# Patient Record
Sex: MALE | Race: WHITE | NOT HISPANIC OR LATINO | Employment: OTHER | URBAN - METROPOLITAN AREA
[De-identification: names, ages, dates, MRNs, and addresses within clinical notes are randomized per-mention and may not be internally consistent; named-entity substitution may affect disease eponyms.]

---

## 2023-05-28 ENCOUNTER — HOSPITAL ENCOUNTER (INPATIENT)
Facility: CLINIC | Age: 88
LOS: 1 days | Discharge: ANOTHER HOSPITAL | DRG: 394 | End: 2023-05-29
Attending: EMERGENCY MEDICINE | Admitting: INTERNAL MEDICINE
Payer: MEDICARE

## 2023-05-28 ENCOUNTER — APPOINTMENT (OUTPATIENT)
Dept: GENERAL RADIOLOGY | Facility: CLINIC | Age: 88
DRG: 394 | End: 2023-05-28
Attending: EMERGENCY MEDICINE
Payer: MEDICARE

## 2023-05-28 ENCOUNTER — ANESTHESIA EVENT (OUTPATIENT)
Dept: SURGERY | Facility: CLINIC | Age: 88
DRG: 394 | End: 2023-05-28
Payer: MEDICARE

## 2023-05-28 ENCOUNTER — ANESTHESIA (OUTPATIENT)
Dept: SURGERY | Facility: CLINIC | Age: 88
DRG: 394 | End: 2023-05-28
Payer: MEDICARE

## 2023-05-28 DIAGNOSIS — R53.1 GENERALIZED WEAKNESS: ICD-10-CM

## 2023-05-28 DIAGNOSIS — J20.9 ACUTE BRONCHITIS, UNSPECIFIED ORGANISM: ICD-10-CM

## 2023-05-28 DIAGNOSIS — R06.09 DYSPNEA ON EXERTION: ICD-10-CM

## 2023-05-28 DIAGNOSIS — N18.4 CHRONIC KIDNEY DISEASE (CKD), STAGE IV (SEVERE) (H): ICD-10-CM

## 2023-05-28 DIAGNOSIS — E66.01 MORBID EXOGENOUS OBESITY (H): ICD-10-CM

## 2023-05-28 DIAGNOSIS — Z79.01 CURRENT USE OF LONG TERM ANTICOAGULATION: ICD-10-CM

## 2023-05-28 DIAGNOSIS — I50.42 CHRONIC COMBINED SYSTOLIC AND DIASTOLIC CONGESTIVE HEART FAILURE (H): ICD-10-CM

## 2023-05-28 DIAGNOSIS — Z95.2 HISTORY OF AORTIC VALVE REPLACEMENT: ICD-10-CM

## 2023-05-28 DIAGNOSIS — T18.108A FOREIGN BODY IN ESOPHAGUS, INITIAL ENCOUNTER: Primary | ICD-10-CM

## 2023-05-28 DIAGNOSIS — R62.7 FAILURE TO THRIVE IN ADULT: ICD-10-CM

## 2023-05-28 DIAGNOSIS — I48.0 PAROXYSMAL ATRIAL FIBRILLATION (H): ICD-10-CM

## 2023-05-28 PROBLEM — M10.9 GOUT: Status: ACTIVE | Noted: 2018-03-03

## 2023-05-28 PROBLEM — K31.1 GASTRIC OUTLET OBSTRUCTION: Status: ACTIVE | Noted: 2023-03-20

## 2023-05-28 PROBLEM — I50.32 CHRONIC DIASTOLIC HEART FAILURE (H): Status: ACTIVE | Noted: 2020-05-06

## 2023-05-28 PROBLEM — I44.7 LBBB (LEFT BUNDLE BRANCH BLOCK): Status: ACTIVE | Noted: 2019-11-10

## 2023-05-28 PROBLEM — M47.816 SPONDYLOSIS OF LUMBAR REGION WITHOUT MYELOPATHY OR RADICULOPATHY: Status: ACTIVE | Noted: 2019-03-27

## 2023-05-28 PROBLEM — I45.9 HEART BLOCK: Status: ACTIVE | Noted: 2019-12-27

## 2023-05-28 PROBLEM — Z95.0 PACEMAKER: Status: ACTIVE | Noted: 2020-01-09

## 2023-05-28 PROBLEM — E11.9 TYPE 2 DIABETES MELLITUS (H): Status: ACTIVE | Noted: 2018-03-03

## 2023-05-28 PROBLEM — I10 ESSENTIAL HYPERTENSION: Status: ACTIVE | Noted: 2019-03-24

## 2023-05-28 PROBLEM — N25.81 SECONDARY HYPERPARATHYROIDISM (H): Status: ACTIVE | Noted: 2023-04-27

## 2023-05-28 LAB
ALBUMIN SERPL BCG-MCNC: 4 G/DL (ref 3.5–5.2)
ALP SERPL-CCNC: 85 U/L (ref 40–129)
ALT SERPL W P-5'-P-CCNC: 11 U/L (ref 10–50)
ANION GAP SERPL CALCULATED.3IONS-SCNC: 15 MMOL/L (ref 7–15)
AST SERPL W P-5'-P-CCNC: 19 U/L (ref 10–50)
BASOPHILS # BLD AUTO: 0 10E3/UL (ref 0–0.2)
BASOPHILS NFR BLD AUTO: 0 %
BILIRUB SERPL-MCNC: 0.8 MG/DL
BUN SERPL-MCNC: 48.7 MG/DL (ref 8–23)
CALCIUM SERPL-MCNC: 9.7 MG/DL (ref 8.8–10.2)
CHLORIDE SERPL-SCNC: 101 MMOL/L (ref 98–107)
CREAT SERPL-MCNC: 1.92 MG/DL (ref 0.67–1.17)
DEPRECATED HCO3 PLAS-SCNC: 27 MMOL/L (ref 22–29)
EOSINOPHIL # BLD AUTO: 0.2 10E3/UL (ref 0–0.7)
EOSINOPHIL NFR BLD AUTO: 3 %
ERYTHROCYTE [DISTWIDTH] IN BLOOD BY AUTOMATED COUNT: 15.2 % (ref 10–15)
FLUAV RNA SPEC QL NAA+PROBE: NEGATIVE
FLUBV RNA RESP QL NAA+PROBE: NEGATIVE
GFR SERPL CREATININE-BSD FRML MDRD: 34 ML/MIN/1.73M2
GLUCOSE BLDC GLUCOMTR-MCNC: 123 MG/DL (ref 70–99)
GLUCOSE SERPL-MCNC: 143 MG/DL (ref 70–99)
HCT VFR BLD AUTO: 32.9 % (ref 40–53)
HGB BLD-MCNC: 10.6 G/DL (ref 13.3–17.7)
HOLD SPECIMEN: NORMAL
IMM GRANULOCYTES # BLD: 0 10E3/UL
IMM GRANULOCYTES NFR BLD: 1 %
INR PPP: 2.3 (ref 0.85–1.15)
LYMPHOCYTES # BLD AUTO: 1 10E3/UL (ref 0.8–5.3)
LYMPHOCYTES NFR BLD AUTO: 14 %
MCH RBC QN AUTO: 31.2 PG (ref 26.5–33)
MCHC RBC AUTO-ENTMCNC: 32.2 G/DL (ref 31.5–36.5)
MCV RBC AUTO: 97 FL (ref 78–100)
MONOCYTES # BLD AUTO: 0.4 10E3/UL (ref 0–1.3)
MONOCYTES NFR BLD AUTO: 6 %
NEUTROPHILS # BLD AUTO: 5 10E3/UL (ref 1.6–8.3)
NEUTROPHILS NFR BLD AUTO: 76 %
NRBC # BLD AUTO: 0 10E3/UL
NRBC BLD AUTO-RTO: 0 /100
NT-PROBNP SERPL-MCNC: 2588 PG/ML (ref 0–1800)
PLATELET # BLD AUTO: 126 10E3/UL (ref 150–450)
POTASSIUM SERPL-SCNC: 3.5 MMOL/L (ref 3.4–5.3)
PROT SERPL-MCNC: 7.7 G/DL (ref 6.4–8.3)
RBC # BLD AUTO: 3.4 10E6/UL (ref 4.4–5.9)
RSV RNA SPEC NAA+PROBE: NEGATIVE
SARS-COV-2 RNA RESP QL NAA+PROBE: NEGATIVE
SODIUM SERPL-SCNC: 143 MMOL/L (ref 136–145)
TROPONIN T SERPL HS-MCNC: 41 NG/L
TROPONIN T SERPL HS-MCNC: 42 NG/L
UPPER GI ENDOSCOPY: NORMAL
WBC # BLD AUTO: 6.6 10E3/UL (ref 4–11)

## 2023-05-28 PROCEDURE — 258N000003 HC RX IP 258 OP 636: Performed by: EMERGENCY MEDICINE

## 2023-05-28 PROCEDURE — 96365 THER/PROPH/DIAG IV INF INIT: CPT | Performed by: EMERGENCY MEDICINE

## 2023-05-28 PROCEDURE — 250N000011 HC RX IP 250 OP 636

## 2023-05-28 PROCEDURE — 250N000013 HC RX MED GY IP 250 OP 250 PS 637: Performed by: INTERNAL MEDICINE

## 2023-05-28 PROCEDURE — 370N000017 HC ANESTHESIA TECHNICAL FEE, PER MIN: Performed by: SPECIALIST

## 2023-05-28 PROCEDURE — 99222 1ST HOSP IP/OBS MODERATE 55: CPT | Mod: AI | Performed by: INTERNAL MEDICINE

## 2023-05-28 PROCEDURE — G0378 HOSPITAL OBSERVATION PER HR: HCPCS

## 2023-05-28 PROCEDURE — 84484 ASSAY OF TROPONIN QUANT: CPT | Performed by: EMERGENCY MEDICINE

## 2023-05-28 PROCEDURE — 250N000013 HC RX MED GY IP 250 OP 250 PS 637: Performed by: FAMILY MEDICINE

## 2023-05-28 PROCEDURE — 43247 EGD REMOVE FOREIGN BODY: CPT | Performed by: SPECIALIST

## 2023-05-28 PROCEDURE — 99222 1ST HOSP IP/OBS MODERATE 55: CPT | Mod: 25 | Performed by: SPECIALIST

## 2023-05-28 PROCEDURE — 250N000011 HC RX IP 250 OP 636: Performed by: NURSE ANESTHETIST, CERTIFIED REGISTERED

## 2023-05-28 PROCEDURE — 80053 COMPREHEN METABOLIC PANEL: CPT | Performed by: EMERGENCY MEDICINE

## 2023-05-28 PROCEDURE — 83880 ASSAY OF NATRIURETIC PEPTIDE: CPT | Performed by: EMERGENCY MEDICINE

## 2023-05-28 PROCEDURE — 71046 X-RAY EXAM CHEST 2 VIEWS: CPT

## 2023-05-28 PROCEDURE — 258N000003 HC RX IP 258 OP 636: Performed by: NURSE ANESTHETIST, CERTIFIED REGISTERED

## 2023-05-28 PROCEDURE — 93010 ELECTROCARDIOGRAM REPORT: CPT | Performed by: EMERGENCY MEDICINE

## 2023-05-28 PROCEDURE — 272N000001 HC OR GENERAL SUPPLY STERILE: Performed by: SPECIALIST

## 2023-05-28 PROCEDURE — 96375 TX/PRO/DX INJ NEW DRUG ADDON: CPT | Performed by: EMERGENCY MEDICINE

## 2023-05-28 PROCEDURE — 93005 ELECTROCARDIOGRAM TRACING: CPT | Performed by: EMERGENCY MEDICINE

## 2023-05-28 PROCEDURE — 710N000009 HC RECOVERY PHASE 1, LEVEL 1, PER MIN: Performed by: SPECIALIST

## 2023-05-28 PROCEDURE — 0DC58ZZ EXTIRPATION OF MATTER FROM ESOPHAGUS, VIA NATURAL OR ARTIFICIAL OPENING ENDOSCOPIC: ICD-10-PCS | Performed by: SPECIALIST

## 2023-05-28 PROCEDURE — 250N000009 HC RX 250: Performed by: NURSE ANESTHETIST, CERTIFIED REGISTERED

## 2023-05-28 PROCEDURE — 250N000009 HC RX 250: Performed by: EMERGENCY MEDICINE

## 2023-05-28 PROCEDURE — 99285 EMERGENCY DEPT VISIT HI MDM: CPT | Mod: 25 | Performed by: EMERGENCY MEDICINE

## 2023-05-28 PROCEDURE — 36415 COLL VENOUS BLD VENIPUNCTURE: CPT | Performed by: EMERGENCY MEDICINE

## 2023-05-28 PROCEDURE — 250N000011 HC RX IP 250 OP 636: Performed by: EMERGENCY MEDICINE

## 2023-05-28 PROCEDURE — 87637 SARSCOV2&INF A&B&RSV AMP PRB: CPT | Performed by: EMERGENCY MEDICINE

## 2023-05-28 PROCEDURE — 85025 COMPLETE CBC W/AUTO DIFF WBC: CPT | Performed by: EMERGENCY MEDICINE

## 2023-05-28 PROCEDURE — 82962 GLUCOSE BLOOD TEST: CPT

## 2023-05-28 PROCEDURE — 360N000075 HC SURGERY LEVEL 2, PER MIN: Performed by: SPECIALIST

## 2023-05-28 PROCEDURE — C9803 HOPD COVID-19 SPEC COLLECT: HCPCS | Performed by: EMERGENCY MEDICINE

## 2023-05-28 PROCEDURE — 85610 PROTHROMBIN TIME: CPT | Performed by: EMERGENCY MEDICINE

## 2023-05-28 RX ORDER — HYDROMORPHONE HCL IN WATER/PF 6 MG/30 ML
PATIENT CONTROLLED ANALGESIA SYRINGE INTRAVENOUS
Status: COMPLETED
Start: 2023-05-28 | End: 2023-05-28

## 2023-05-28 RX ORDER — POTASSIUM CHLORIDE 20MEQ/15ML
20 LIQUID (ML) ORAL DAILY
Status: DISCONTINUED | OUTPATIENT
Start: 2023-05-29 | End: 2023-05-29 | Stop reason: HOSPADM

## 2023-05-28 RX ORDER — ALLOPURINOL 100 MG/1
100 TABLET ORAL DAILY
Status: DISCONTINUED | OUTPATIENT
Start: 2023-05-28 | End: 2023-05-29 | Stop reason: HOSPADM

## 2023-05-28 RX ORDER — WARFARIN SODIUM 2 MG/1
6 TABLET ORAL
Status: DISCONTINUED | OUTPATIENT
Start: 2023-05-28 | End: 2023-05-29

## 2023-05-28 RX ORDER — CALCITRIOL 0.25 UG/1
0.25 CAPSULE, LIQUID FILLED ORAL DAILY
Status: DISCONTINUED | OUTPATIENT
Start: 2023-05-28 | End: 2023-05-29 | Stop reason: HOSPADM

## 2023-05-28 RX ORDER — FENTANYL CITRATE-0.9 % NACL/PF 10 MCG/ML
PLASTIC BAG, INJECTION (ML) INTRAVENOUS PRN
Status: DISCONTINUED | OUTPATIENT
Start: 2023-05-28 | End: 2023-05-28

## 2023-05-28 RX ORDER — NALOXONE HYDROCHLORIDE 0.4 MG/ML
0.2 INJECTION, SOLUTION INTRAMUSCULAR; INTRAVENOUS; SUBCUTANEOUS
Status: DISCONTINUED | OUTPATIENT
Start: 2023-05-28 | End: 2023-05-29

## 2023-05-28 RX ORDER — NALOXONE HYDROCHLORIDE 0.4 MG/ML
0.4 INJECTION, SOLUTION INTRAMUSCULAR; INTRAVENOUS; SUBCUTANEOUS
Status: DISCONTINUED | OUTPATIENT
Start: 2023-05-28 | End: 2023-05-29

## 2023-05-28 RX ORDER — HYDROMORPHONE HCL IN WATER/PF 6 MG/30 ML
0.4 PATIENT CONTROLLED ANALGESIA SYRINGE INTRAVENOUS EVERY 5 MIN PRN
Status: DISCONTINUED | OUTPATIENT
Start: 2023-05-28 | End: 2023-05-28 | Stop reason: HOSPADM

## 2023-05-28 RX ORDER — WARFARIN SODIUM 4 MG/1
4 TABLET ORAL DAILY
Status: ON HOLD | COMMUNITY
End: 2023-06-07

## 2023-05-28 RX ORDER — UREA 8.5 G/85G
1 CREAM TOPICAL PRN
COMMUNITY
End: 2024-01-15

## 2023-05-28 RX ORDER — DIPHENHYDRAMINE HCL 12.5 MG/5ML
12.5 SOLUTION ORAL EVERY 6 HOURS PRN
Status: DISCONTINUED | OUTPATIENT
Start: 2023-05-28 | End: 2023-05-28 | Stop reason: HOSPADM

## 2023-05-28 RX ORDER — SUCRALFATE 1 G/1
1 TABLET ORAL 2 TIMES DAILY WITH MEALS
Status: DISCONTINUED | OUTPATIENT
Start: 2023-05-28 | End: 2023-05-29 | Stop reason: HOSPADM

## 2023-05-28 RX ORDER — ONDANSETRON 4 MG/1
4 TABLET, ORALLY DISINTEGRATING ORAL EVERY 30 MIN PRN
Status: DISCONTINUED | OUTPATIENT
Start: 2023-05-28 | End: 2023-05-28 | Stop reason: HOSPADM

## 2023-05-28 RX ORDER — FERROUS SULFATE 325(65) MG
325 TABLET ORAL
COMMUNITY

## 2023-05-28 RX ORDER — DIMENHYDRINATE 50 MG/ML
25 INJECTION, SOLUTION INTRAMUSCULAR; INTRAVENOUS
Status: DISCONTINUED | OUTPATIENT
Start: 2023-05-28 | End: 2023-05-28 | Stop reason: HOSPADM

## 2023-05-28 RX ORDER — METOPROLOL TARTRATE 1 MG/ML
1-2 INJECTION, SOLUTION INTRAVENOUS EVERY 5 MIN PRN
Status: DISCONTINUED | OUTPATIENT
Start: 2023-05-28 | End: 2023-05-28 | Stop reason: HOSPADM

## 2023-05-28 RX ORDER — ROPINIROLE 1 MG/1
1 TABLET, FILM COATED ORAL AT BEDTIME
COMMUNITY

## 2023-05-28 RX ORDER — NALOXONE HYDROCHLORIDE 0.4 MG/ML
0.4 INJECTION, SOLUTION INTRAMUSCULAR; INTRAVENOUS; SUBCUTANEOUS
Status: DISCONTINUED | OUTPATIENT
Start: 2023-05-28 | End: 2023-05-29 | Stop reason: HOSPADM

## 2023-05-28 RX ORDER — SPIRONOLACTONE 25 MG
12.5 TABLET ORAL DAILY
Status: DISCONTINUED | OUTPATIENT
Start: 2023-05-28 | End: 2023-05-29 | Stop reason: HOSPADM

## 2023-05-28 RX ORDER — LIDOCAINE 40 MG/G
CREAM TOPICAL
Status: CANCELLED | OUTPATIENT
Start: 2023-05-28

## 2023-05-28 RX ORDER — NALOXONE HYDROCHLORIDE 0.4 MG/ML
0.2 INJECTION, SOLUTION INTRAMUSCULAR; INTRAVENOUS; SUBCUTANEOUS
Status: DISCONTINUED | OUTPATIENT
Start: 2023-05-28 | End: 2023-05-29 | Stop reason: HOSPADM

## 2023-05-28 RX ORDER — PANTOPRAZOLE SODIUM 40 MG/1
40 TABLET, DELAYED RELEASE ORAL
Status: DISCONTINUED | OUTPATIENT
Start: 2023-05-28 | End: 2023-05-28

## 2023-05-28 RX ORDER — FENTANYL CITRATE-0.9 % NACL/PF 10 MCG/ML
100 PLASTIC BAG, INJECTION (ML) INTRAVENOUS EVERY 5 MIN PRN
Status: DISCONTINUED | OUTPATIENT
Start: 2023-05-28 | End: 2023-05-29 | Stop reason: HOSPADM

## 2023-05-28 RX ORDER — HYDROMORPHONE HCL IN WATER/PF 6 MG/30 ML
0.2 PATIENT CONTROLLED ANALGESIA SYRINGE INTRAVENOUS EVERY 5 MIN PRN
Status: DISCONTINUED | OUTPATIENT
Start: 2023-05-28 | End: 2023-05-28 | Stop reason: HOSPADM

## 2023-05-28 RX ORDER — SODIUM CHLORIDE, SODIUM LACTATE, POTASSIUM CHLORIDE, CALCIUM CHLORIDE 600; 310; 30; 20 MG/100ML; MG/100ML; MG/100ML; MG/100ML
INJECTION, SOLUTION INTRAVENOUS CONTINUOUS
Status: DISCONTINUED | OUTPATIENT
Start: 2023-05-28 | End: 2023-05-28 | Stop reason: HOSPADM

## 2023-05-28 RX ORDER — ONDANSETRON 2 MG/ML
INJECTION INTRAMUSCULAR; INTRAVENOUS PRN
Status: DISCONTINUED | OUTPATIENT
Start: 2023-05-28 | End: 2023-05-28

## 2023-05-28 RX ORDER — HYDROMORPHONE HCL IN WATER/PF 6 MG/30 ML
PATIENT CONTROLLED ANALGESIA SYRINGE INTRAVENOUS
Status: DISCONTINUED
Start: 2023-05-28 | End: 2023-05-28 | Stop reason: HOSPADM

## 2023-05-28 RX ORDER — SPIRONOLACTONE 25 MG/1
0.5 TABLET ORAL DAILY
COMMUNITY
Start: 2023-02-09 | End: 2024-01-15

## 2023-05-28 RX ORDER — LIDOCAINE 40 MG/G
CREAM TOPICAL
Status: DISCONTINUED | OUTPATIENT
Start: 2023-05-28 | End: 2023-05-29 | Stop reason: HOSPADM

## 2023-05-28 RX ORDER — LEVOTHYROXINE SODIUM 100 UG/1
100 TABLET ORAL DAILY
Status: DISCONTINUED | OUTPATIENT
Start: 2023-05-28 | End: 2023-05-29 | Stop reason: HOSPADM

## 2023-05-28 RX ORDER — PANTOPRAZOLE SODIUM 40 MG/1
40 TABLET, DELAYED RELEASE ORAL
Status: ON HOLD | COMMUNITY
End: 2023-06-03

## 2023-05-28 RX ORDER — CEFTRIAXONE 2 G/1
2 INJECTION, POWDER, FOR SOLUTION INTRAMUSCULAR; INTRAVENOUS ONCE
Status: COMPLETED | OUTPATIENT
Start: 2023-05-28 | End: 2023-05-28

## 2023-05-28 RX ORDER — ATORVASTATIN CALCIUM 10 MG/1
10 TABLET, FILM COATED ORAL DAILY
Status: DISCONTINUED | OUTPATIENT
Start: 2023-05-28 | End: 2023-05-29 | Stop reason: HOSPADM

## 2023-05-28 RX ORDER — ROPINIROLE 1 MG/1
1 TABLET, FILM COATED ORAL AT BEDTIME
Status: DISCONTINUED | OUTPATIENT
Start: 2023-05-28 | End: 2023-05-29 | Stop reason: HOSPADM

## 2023-05-28 RX ORDER — SODIUM CHLORIDE, SODIUM LACTATE, POTASSIUM CHLORIDE, CALCIUM CHLORIDE 600; 310; 30; 20 MG/100ML; MG/100ML; MG/100ML; MG/100ML
INJECTION, SOLUTION INTRAVENOUS CONTINUOUS PRN
Status: DISCONTINUED | OUTPATIENT
Start: 2023-05-28 | End: 2023-05-28

## 2023-05-28 RX ORDER — BUMETANIDE 1 MG/1
2 TABLET ORAL 2 TIMES DAILY
Status: DISCONTINUED | OUTPATIENT
Start: 2023-05-28 | End: 2023-05-29 | Stop reason: HOSPADM

## 2023-05-28 RX ORDER — FENTANYL CITRATE 50 UG/ML
25 INJECTION, SOLUTION INTRAMUSCULAR; INTRAVENOUS EVERY 5 MIN PRN
Status: DISCONTINUED | OUTPATIENT
Start: 2023-05-28 | End: 2023-05-28 | Stop reason: HOSPADM

## 2023-05-28 RX ORDER — ATORVASTATIN CALCIUM 10 MG/1
10 TABLET, FILM COATED ORAL DAILY
COMMUNITY
End: 2024-01-15

## 2023-05-28 RX ORDER — ACETAMINOPHEN 500 MG
500 TABLET ORAL DAILY
Status: DISCONTINUED | OUTPATIENT
Start: 2023-05-28 | End: 2023-05-29 | Stop reason: HOSPADM

## 2023-05-28 RX ORDER — ALLOPURINOL 100 MG/1
100 TABLET ORAL DAILY
COMMUNITY
End: 2024-01-15

## 2023-05-28 RX ORDER — FLUMAZENIL 0.1 MG/ML
0.2 INJECTION, SOLUTION INTRAVENOUS
Status: ACTIVE | OUTPATIENT
Start: 2023-05-28 | End: 2023-05-29

## 2023-05-28 RX ORDER — EPHEDRINE SULFATE 50 MG/ML
INJECTION, SOLUTION INTRAVENOUS PRN
Status: DISCONTINUED | OUTPATIENT
Start: 2023-05-28 | End: 2023-05-28

## 2023-05-28 RX ORDER — PROPOFOL 10 MG/ML
INJECTION, EMULSION INTRAVENOUS PRN
Status: DISCONTINUED | OUTPATIENT
Start: 2023-05-28 | End: 2023-05-28

## 2023-05-28 RX ORDER — LEVOTHYROXINE SODIUM 100 UG/1
100 TABLET ORAL DAILY
COMMUNITY

## 2023-05-28 RX ORDER — FENTANYL CITRATE 50 UG/ML
INJECTION, SOLUTION INTRAMUSCULAR; INTRAVENOUS PRN
Status: DISCONTINUED | OUTPATIENT
Start: 2023-05-28 | End: 2023-05-28

## 2023-05-28 RX ORDER — ONDANSETRON 2 MG/ML
4 INJECTION INTRAMUSCULAR; INTRAVENOUS EVERY 30 MIN PRN
Status: DISCONTINUED | OUTPATIENT
Start: 2023-05-28 | End: 2023-05-28 | Stop reason: HOSPADM

## 2023-05-28 RX ORDER — ACETAMINOPHEN 500 MG
500 TABLET ORAL DAILY
COMMUNITY
End: 2024-01-15

## 2023-05-28 RX ORDER — GABAPENTIN 100 MG/1
100 CAPSULE ORAL DAILY
Status: DISCONTINUED | OUTPATIENT
Start: 2023-05-28 | End: 2023-05-29 | Stop reason: HOSPADM

## 2023-05-28 RX ORDER — POTASSIUM CHLORIDE 1500 MG/1
20 TABLET, EXTENDED RELEASE ORAL DAILY
Status: DISCONTINUED | OUTPATIENT
Start: 2023-05-28 | End: 2023-05-28

## 2023-05-28 RX ORDER — DIPHENHYDRAMINE HYDROCHLORIDE 50 MG/ML
12.5 INJECTION INTRAMUSCULAR; INTRAVENOUS EVERY 6 HOURS PRN
Status: DISCONTINUED | OUTPATIENT
Start: 2023-05-28 | End: 2023-05-28 | Stop reason: HOSPADM

## 2023-05-28 RX ORDER — CALCITRIOL 0.25 UG/1
0.25 CAPSULE, LIQUID FILLED ORAL DAILY
COMMUNITY

## 2023-05-28 RX ORDER — FERROUS SULFATE 325(65) MG
325 TABLET ORAL
Status: DISCONTINUED | OUTPATIENT
Start: 2023-05-29 | End: 2023-05-29 | Stop reason: HOSPADM

## 2023-05-28 RX ORDER — FENTANYL CITRATE 50 UG/ML
50 INJECTION, SOLUTION INTRAMUSCULAR; INTRAVENOUS EVERY 5 MIN PRN
Status: DISCONTINUED | OUTPATIENT
Start: 2023-05-28 | End: 2023-05-28 | Stop reason: HOSPADM

## 2023-05-28 RX ORDER — GABAPENTIN 100 MG/1
100 CAPSULE ORAL DAILY
COMMUNITY
End: 2024-01-15

## 2023-05-28 RX ORDER — ONDANSETRON 2 MG/ML
4 INJECTION INTRAMUSCULAR; INTRAVENOUS ONCE
Status: COMPLETED | OUTPATIENT
Start: 2023-05-28 | End: 2023-05-28

## 2023-05-28 RX ORDER — POTASSIUM CHLORIDE 750 MG/1
2 TABLET, EXTENDED RELEASE ORAL
COMMUNITY

## 2023-05-28 RX ORDER — ELECTROLYTES/DEXTROSE
1 SOLUTION, ORAL ORAL DAILY
COMMUNITY
End: 2024-01-15

## 2023-05-28 RX ORDER — SUCRALFATE 1 G/1
1 TABLET ORAL 2 TIMES DAILY WITH MEALS
COMMUNITY
End: 2024-01-15

## 2023-05-28 RX ORDER — BUMETANIDE 2 MG/1
4 TABLET ORAL 3 TIMES DAILY
COMMUNITY

## 2023-05-28 RX ADMIN — HYDROMORPHONE HYDROCHLORIDE 0.2 MG: 0.2 INJECTION, SOLUTION INTRAMUSCULAR; INTRAVENOUS; SUBCUTANEOUS at 16:52

## 2023-05-28 RX ADMIN — ONDANSETRON 4 MG: 2 INJECTION INTRAMUSCULAR; INTRAVENOUS at 15:19

## 2023-05-28 RX ADMIN — ROPINIROLE HYDROCHLORIDE 1 MG: 1 TABLET, FILM COATED ORAL at 20:18

## 2023-05-28 RX ADMIN — HYDROMORPHONE HYDROCHLORIDE 0.4 MG: 0.2 INJECTION, SOLUTION INTRAMUSCULAR; INTRAVENOUS; SUBCUTANEOUS at 17:00

## 2023-05-28 RX ADMIN — ONDANSETRON 4 MG: 2 INJECTION INTRAMUSCULAR; INTRAVENOUS at 10:28

## 2023-05-28 RX ADMIN — FENTANYL CITRATE 25 MCG: 50 INJECTION, SOLUTION INTRAMUSCULAR; INTRAVENOUS at 15:33

## 2023-05-28 RX ADMIN — LIDOCAINE HYDROCHLORIDE 50 MG: 10 INJECTION, SOLUTION EPIDURAL; INFILTRATION; INTRACAUDAL; PERINEURAL at 15:11

## 2023-05-28 RX ADMIN — PROPOFOL 150 MCG/KG/MIN: 10 INJECTION, EMULSION INTRAVENOUS at 15:12

## 2023-05-28 RX ADMIN — SODIUM CHLORIDE, POTASSIUM CHLORIDE, SODIUM LACTATE AND CALCIUM CHLORIDE: 600; 310; 30; 20 INJECTION, SOLUTION INTRAVENOUS at 15:07

## 2023-05-28 RX ADMIN — BENZOCAINE 6 MG-MENTHOL 10 MG LOZENGES 1 LOZENGE: at 18:22

## 2023-05-28 RX ADMIN — CEFTRIAXONE SODIUM 2 G: 2 INJECTION, POWDER, FOR SOLUTION INTRAMUSCULAR; INTRAVENOUS at 09:40

## 2023-05-28 RX ADMIN — Medication 100 MG: at 15:11

## 2023-05-28 RX ADMIN — Medication 0.4 MG: at 17:00

## 2023-05-28 RX ADMIN — FENTANYL CITRATE 25 MCG: 50 INJECTION, SOLUTION INTRAMUSCULAR; INTRAVENOUS at 15:11

## 2023-05-28 RX ADMIN — Medication 100 MCG: at 15:12

## 2023-05-28 RX ADMIN — Medication 200 MCG: at 16:20

## 2023-05-28 RX ADMIN — ACETAMINOPHEN 500 MG: 500 TABLET ORAL at 20:19

## 2023-05-28 RX ADMIN — EPHEDRINE SULFATE 5 MG: 50 INJECTION, SOLUTION INTRAVENOUS at 15:53

## 2023-05-28 RX ADMIN — EPHEDRINE SULFATE 10 MG: 50 INJECTION, SOLUTION INTRAVENOUS at 15:24

## 2023-05-28 RX ADMIN — AZITHROMYCIN MONOHYDRATE 500 MG: 500 INJECTION, POWDER, LYOPHILIZED, FOR SOLUTION INTRAVENOUS at 10:28

## 2023-05-28 RX ADMIN — PROPOFOL 130 MG: 10 INJECTION, EMULSION INTRAVENOUS at 15:11

## 2023-05-28 RX ADMIN — EPHEDRINE SULFATE 10 MG: 50 INJECTION, SOLUTION INTRAVENOUS at 15:30

## 2023-05-28 ASSESSMENT — ACTIVITIES OF DAILY LIVING (ADL)
ADLS_ACUITY_SCORE: 33
ADLS_ACUITY_SCORE: 35
ADLS_ACUITY_SCORE: 33
ADLS_ACUITY_SCORE: 41
ADLS_ACUITY_SCORE: 31
ADLS_ACUITY_SCORE: 35
ADLS_ACUITY_SCORE: 35
ADLS_ACUITY_SCORE: 33
ADLS_ACUITY_SCORE: 41

## 2023-05-28 ASSESSMENT — ENCOUNTER SYMPTOMS: DYSRHYTHMIAS: 1

## 2023-05-28 NOTE — PROGRESS NOTES
Spoke with MD about medications and orders. No medications at this time PO. Hold all meds and BP meds.

## 2023-05-28 NOTE — ED TRIAGE NOTES
Pt arrives with c/o SOB and productive cough for a few days. Denies chest pain or fevers     Triage Assessment     Row Name 05/28/23 0703       Triage Assessment (Adult)    Airway WDL WDL       Respiratory WDL    Respiratory WDL X;cough    Cough Frequency frequent    Cough Type productive       Skin Circulation/Temperature WDL    Skin Circulation/Temperature WDL WDL       Cardiac WDL    Cardiac WDL WDL       Peripheral/Neurovascular WDL    Peripheral Neurovascular WDL WDL       Cognitive/Neuro/Behavioral WDL    Cognitive/Neuro/Behavioral WDL WDL

## 2023-05-28 NOTE — PROGRESS NOTES
Patient requesting allowing daughters to get health infor    Viviane Izaguirre [phone]  Myla Valerio [phone]

## 2023-05-28 NOTE — ANESTHESIA PREPROCEDURE EVALUATION
Anesthesia Pre-Procedure Evaluation    Patient: Chacorta Mendoza   MRN: [id number] : 1935        Procedure : Procedure(s):  ESOPHAGOGASTRODUODENOSCOPY, WITH FOREIGN BODY REMOVAL          No past medical history on file.   No past surgical history on file.   Allergies   Allergen Reactions     Levofloxacin Muscle Pain (Myalgia) and Other (See Comments)     Other reaction(s): Myalgias  Muscle Pain  Muscle Pain  Muscle Pain       Lisinopril Other (See Comments)     Changed to ARB due to rising creatinine  Changed to ARB due to rising creatinine  Changed to ARB due to rising creatinine        Social History     Tobacco Use     Smoking status: Not on file     Smokeless tobacco: Not on file   Substance Use Topics     Alcohol use: Not on file      Wt Readings from Last 1 Encounters:   23 136.1 kg (300 lb)        Anesthesia Evaluation   Pt has had prior anesthetic. Type: General.        ROS/MED HX  ENT/Pulmonary:     (+) sleep apnea, moderate,     Neurologic:       Cardiovascular: Comment: 1.  Severe aortic stenosis              - status post TAVR with Donnell S3 29 mm valve on 2019.   2. 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  Persistent atrial fibrillation              - on anticoagulation with warfarin.   4.  Tachy-tye syndrome              -s/p Micra pacemaker placement 2019  5.  Stage III kidney disease.  6. Hypertension      (+) hypertension-----CHF pacemaker, dysrhythmias, a-fib, valvular problems/murmurs     METS/Exercise Tolerance:     Hematologic:     (+) anemia,     Musculoskeletal:       GI/Hepatic:       Renal/Genitourinary:     (+) renal disease,     Endo:     (+) type II DM, thyroid problem, Obesity,     Psychiatric/Substance Use:       Infectious Disease:       Malignancy:       Other:            Physical Exam    Airway  airway exam normal      Mallampati: II   TM distance: > 3 FB   Neck ROM: full   Mouth opening: > 3 cm    Respiratory Devices and Support          Dental       (+) Minor Abnormalities - some fillings, tiny chips      Cardiovascular             Pulmonary           breath sounds clear to auscultation           OUTSIDE LABS:  CBC:   Lab Results   Component Value Date    WBC 6.6 05/28/2023    HGB 10.6 (L) 05/28/2023    HCT 32.9 (L) 05/28/2023     (L) 05/28/2023     BMP:   Lab Results   Component Value Date     05/28/2023    POTASSIUM 3.5 05/28/2023    CHLORIDE 101 05/28/2023    CO2 27 05/28/2023    BUN 48.7 (H) 05/28/2023    CR 1.92 (H) 05/28/2023     (H) 05/28/2023     COAGS:   Lab Results   Component Value Date    INR 2.30 (H) 05/28/2023     POC: No results found for: BGM, HCG, HCGS  HEPATIC:   Lab Results   Component Value Date    ALBUMIN 4.0 05/28/2023    PROTTOTAL 7.7 05/28/2023    ALT 11 05/28/2023    AST 19 05/28/2023    ALKPHOS 85 05/28/2023    BILITOTAL 0.8 05/28/2023     OTHER:   Lab Results   Component Value Date    MATTHEW 9.7 05/28/2023       Anesthesia Plan    ASA Status:  4   NPO Status:  NPO Appropriate    Anesthesia Type: General.     - Airway: ETT   Induction: Intravenous, Propofol.   Maintenance: TIVA.        Consents    Anesthesia Plan(s) and associated risks, benefits, and realistic alternatives discussed. Questions answered and patient/representative(s) expressed understanding.     - Discussed: Risks, Benefits and Alternatives for BOTH SEDATION and the PROCEDURE were discussed     - Discussed with:  Patient      - Extended Intubation/Ventilatory Support Discussed: No.      - Patient is DNR/DNI Status: No    Use of blood products discussed: No .     Postoperative Care    Pain management: IV analgesics.   PONV prophylaxis: Ondansetron (or other 5HT-3)     Comments:                EMILIO Simeon CRNA

## 2023-05-28 NOTE — H&P
[facility]    History and Physical  Hospital Medicine    Chacorta Mendoza MRN# [id number]   Age: 87 year old YOB: 1935     Date of Admission:  5/28/2023    Home clinic: [facility]  Primary care provider: [facility]         Chief Complaint:   SPITTING UP FOAMY STUFF ALL NIGHT    History is obtained from the patient          History of Present Illness:   This patient is a 87 year old  male with a significant past medical history of hypertension afib on coumadin and morbid obesity who presents with spitting foamy stuff all night. He ate meatloaf and potatoes last evening and went to bed. All night he was spitting up foamy stuff. No CP/SOB-cough while spitting up. No nausea but did thow up even water after he drank. No fever/ chills. No recent illness.            Past Medical History:     Patient Active Problem List    Diagnosis Date Noted     Failure to thrive in adult 05/28/2023     Priority: Medium     Secondary hyperparathyroidism (H) 04/27/2023     Priority: Medium     Chronic kidney disease (CKD), stage IV (severe) (H) 03/20/2023     Priority: Medium     Gastric outlet obstruction 03/20/2023     Priority: Medium     Current use of long term anticoagulation 04/14/2021     Priority: Medium     Chronic diastolic heart failure (H) 05/06/2020     Priority: Medium     Formatting of this note might be different from the original.  In 2001, LVEF 38%  In 2003, LVEF 55-65% after cardioversion and maintenance of normal sinus rhythm  2000 normal coronary angiography       Pacemaker 01/09/2020     Priority: Medium     Formatting of this note might be different from the original.  Date of last device in office evaluation: 2/2/2023  Following clinic: [facility]       Heart block 12/27/2019     Priority: Medium     LBBB (left bundle branch block) 11/10/2019     Priority: Medium     Formatting of this note might be different from the original.    noted after TAVR   -Toprol XL 25mg daily  resumed at discharge from hospital       Spondylosis of lumbar region without myelopathy or radiculopathy 03/27/2019     Priority: Medium     Formatting of this note might be different from the original.  2015 MRI L5 vertebroplasty . At the L4-5 level,  . There is moderate central canal narrowing.   4/2021 CT lumbar   Progressive central canal stenosis L3-L4 which is moderate to moderately severe.   Mild canal stenosis L2-L3 is stable with mild to moderate canal stenosis L4-L5, also stable.  4/2021 S/P epidural       Essential hypertension 03/24/2019     Priority: Medium     Gout 03/03/2018     Priority: Medium     History of aortic valve replacement 03/03/2018     Priority: Medium     Formatting of this note might be different from the original.  Dr Perrin   Severe aortic stenosis - status post TAVR with Donnell S3 29 mm valve on 09/25/2019  Formatting of this note might be different from the original.  Dr Perrin  9/25/2020  Severe aortic stenosis - status post TAVR     Pericardial effusion post-TAVR, status post pericardiocentesis       Type 2 diabetes mellitus (H) 03/03/2018     Priority: Medium     Formatting of this note might be different from the original.  Chronic kidney disease  Formatting of this note might be different from the original.  Chronic kidney disease       Restless leg syndrome 05/15/2016     Priority: Medium     Morbid exogenous obesity (H) 06/22/2012     Priority: Medium     Formatting of this note might be different from the original.       JAZMIN (obstructive sleep apnea) 05/17/2011     Priority: Medium     Formatting of this note might be different from the original.  Does not use CPAP       Hypercholesterolemia 01/05/2011     Priority: Medium     Formatting of this note might be different from the original.       Macrocytic anemia 10/22/2008     Priority: Medium     Formatting of this note might be different from the original.  Dr Parisa Vargas  Anemia of ckd and iron def  consider EPO growth  factor support if hemoglobin is below 10.   2/ 2022 IRON INFUSION.    Formatting of this note might be different from the original.  Dr Parisa Vargas   Anemia of ckd and iron def  Low platelets can be secondary to consumption    Bone marrow biopsy on 11/29/2021 revealed hypercellular bone marrow with appropriate trilineage hematopoiesis, no evidence of leukemia and decreased iron stores cytogenetics normal  Hepatitis profile -ve ,B12 and folic acid level normal ,Ferritin at 52 low    - consider EPO growth factor support if hemoglobin is below 10. at this point hb 10.5  -on iv iron venofer    Formatting of this note might be different from the original.  Blood morphology on 10/15/08 showed no hemolysis and morphologic features of myelodysplasia were not seen.   TSH, B12, RBC folate normal 10/08.       Paroxysmal atrial fibrillation (H) 05/04/2007     Priority: Medium     Formatting of this note might be different from the original.  Cardioverted- failed.  Converted in higher dose amiodarone.       Hypothyroidism (acquired) 03/28/2007     Priority: Medium     Formatting of this note might be different from the original.  Amiodarone related  Negative thyroid autoantibodies                 Past Surgical History:    No past surgical history on file.          Social History:     Social History     Socioeconomic History     Marital status:      Spouse name: Not on file     Number of children: Not on file     Years of education: Not on file     Highest education level: Not on file   Occupational History     Not on file   Tobacco Use     Smoking status: Not on file     Smokeless tobacco: Not on file   Substance and Sexual Activity     Alcohol use: Not on file     Drug use: Not on file     Sexual activity: Not on file   Other Topics Concern     Not on file   Social History Narrative     Not on file     Social Determinants of Health     Financial Resource Strain: Not on file   Food Insecurity: Not on file   Transportation  Needs: Not on file   Physical Activity: Not on file   Stress: Not on file   Social Connections: Not on file   Intimate Partner Violence: Not on file   Housing Stability: Not on file             Family History:   No family history on file.          Allergies:     Allergies   Allergen Reactions     Levofloxacin Muscle Pain (Myalgia) and Other (See Comments)     Other reaction(s): Myalgias  Muscle Pain  Muscle Pain  Muscle Pain       Lisinopril Other (See Comments)     Changed to ARB due to rising creatinine  Changed to ARB due to rising creatinine  Changed to ARB due to rising creatinine               Medications:     Prior to Admission medications    Medication Sig Last Dose Taking? Auth Provider Long Term End Date   acetaminophen (TYLENOL) 500 MG tablet Take 500 mg by mouth daily 5/27/2023 at am Yes Reported, Patient     allopurinol (ZYLOPRIM) 100 MG tablet Take 100 mg by mouth daily 5/27/2023 at hs Yes Reported, Patient     atorvastatin (LIPITOR) 10 MG tablet Take 10 mg by mouth daily 5/27/2023 at am Yes Reported, Patient Yes    bumetanide (BUMEX) 2 MG tablet Take 2 mg by mouth 2 times daily 2 tabs at 0800 and 1 tab at 1300 5/27/2023 at 1300 Yes Reported, Patient Yes    calcitRIOL (ROCALTROL) 0.25 MCG capsule Take 0.25 mcg by mouth daily 5/27/2023 at am Yes Reported, Patient Yes    ferrous sulfate (FEROSUL) 325 (65 Fe) MG tablet Take 325 mg by mouth daily (with breakfast) 5/27/2023 at am Yes Reported, Patient     gabapentin (NEURONTIN) 100 MG capsule Take 100 mg by mouth daily 5/27/2023 at am Yes Reported, Patient Yes    levothyroxine (SYNTHROID/LEVOTHROID) 100 MCG tablet Take 100 mcg by mouth daily 5/27/2023 at am Yes Reported, Patient Yes    Multiple Vitamin (MULTIVITAMIN ADULT) TABS Take 1 tablet by mouth daily 5/27/2023 at am Yes Reported, Patient     OTHER MEDICAL SUPPLIES by Other route See Admin Instructions Diabetic shoe  Yes Reported, Patient     pantoprazole (PROTONIX) 40 MG EC tablet Take 40 mg by mouth  "2 times daily (before meals) Past Month at am Yes Reported, Patient     potassium chloride ER (KLOR-CON M) 10 MEQ CR tablet Take 2 tablets by mouth daily with food 5/27/2023 at 1700 Yes Reported, Patient     rOPINIRole (REQUIP) 1 MG tablet Take 1 mg by mouth At Bedtime 5/27/2023 at hs Yes Reported, Patient Yes    sacubitril-valsartan (ENTRESTO) 24-26 MG per tablet Take 0.5 tablets by mouth 2 times daily 5/27/2023 at hs Yes Reported, Patient Yes    spironolactone (ALDACTONE) 25 MG tablet Take 0.5 mg by mouth daily Unknown at unknown Yes Reported, Patient Yes    sucralfate (CARAFATE) 1 GM tablet Take 1 g by mouth 2 times daily (with meals) 5/27/2023 at am Yes Reported, Patient     urea (CARMOL) 10 % external cream Apply 1 Application topically as needed Unknown at unknown Yes Reported, Patient     warfarin ANTICOAGULANT (COUMADIN) 4 MG tablet Take 4 mg by mouth daily 4 mg Monday, Wednesday, Friday and 6 mg the other 4 days 5/27/2023 at am Yes Reported, Patient              Review of Systems:   The Review of Systems is negative in ALL other than noted in the HPI          Physical Exam:   Blood pressure 114/47, pulse 62, temperature 97.6  F (36.4  C), temperature source Oral, resp. rate 18, height 1.854 m (6' 1\"), weight 136.1 kg (300 lb), SpO2 95 %.  GENERAL APPEARANCE: obese, alert and no distress  EYES: conjunctiva clear, eyes grossly normal  HENT: external ears and nose normal   NECK: supple, no masses or adenopathy  RESP: lungs clear to auscultation - no rales, rhonchi or wheezes  CV: regular rate and rhythm, normal S1 S2, no S3 or S4 and no murmur, click or rub   ABDOMEN: soft, nontender, no HSM or masses and bowel sounds normal  MS: no clubbing, cyanosis; no edema  SKIN: clear without significant rashes or lesions  NEURO: Normal strength and tone, sensory exam grossly normal, mentation intact and speech normal         Data:     Lab Results   Component Value Date    WBC 6.6 05/28/2023    HGB 10.6 (L) 05/28/2023    " "HCT 32.9 (L) 05/28/2023    MCV 97 05/28/2023     (L) 05/28/2023     Lab Results   Component Value Date     05/28/2023    CO2 27 05/28/2023     Lab Results   Component Value Date    BUN 48.7 (H) 05/28/2023     No components found for: SEDRATE  No results found for: DDIMER  No results found for: BNP  No results found for: TSH  No results found for: TROPONIN  UA RESULTS:  No results for input(s): COLOR, APPEARANCE, URINEGLC, URINEBILI, URINEKETONE, SG, UBLD, URINEPH, PROTEIN, UROBILINOGEN, NITRITE, LEUKEST, RBCU, WBCU in the last [id number] hours.  Liver Function Studies -   Recent Labs   Lab Test 05/28/23  0709   PROTTOTAL 7.7   ALBUMIN 4.0   BILITOTAL 0.8   ALKPHOS 85   AST 19   ALT 11       EKG results:  NSR    RADIOLOGY:  CXR-IMPRESSION: Cardiomegaly. Patchy bibasilar opacities identified. Pneumonia is possible. Mild bilateral vascular and interstitial prominence that may be a mild degree of edema.     Assessment & Plan   Chacorta Mendoza is a 87 year old male who presents on 5/28/2023 with spitting up foamy stuff all night    Probably foreign body in esophagus  All sx started after eating meatloaf and potatoes last evening. Unable to even dring water.  Needs EGD-discussed with Dr Gaitan. Pt will have EGD now    Other chronic medical conditions -afib/ diastolic CHF/ CKD/hypo t4/ HLD/ dIet controlled DM2  All stable. Will continue meds as appropriate.        Clinically Significant Risk Factors Present on Admission               # Drug Induced Coagulation Defect: home medication list includes an anticoagulant medication  # Thrombocytopenia: Lowest platelets = 126 in last 2 days, will monitor for bleeding   # Hypertension: Noted on problem list      # Obesity: Estimated body mass index is 39.58 kg/m  as calculated from the following:    Height as of this encounter: 1.854 m (6' 1\").    Weight as of this encounter: 136.1 kg (300 lb).                         Juan C Valencia MD  [phone]         "

## 2023-05-28 NOTE — MEDICATION SCRIBE - ADMISSION MEDICATION HISTORY
Medication Scribe Admission Medication History    Admission medication history is complete. The information provided in this note is only as accurate as the sources available at the time of the update.    Medication reconciliation/reorder completed by provider prior to medication history? No    Information Source(s): Patient via in-person    Pertinent Information:     Changes made to PTA medication list:    Added: tylenol, allopurinol, atorvastatin, bumex, rocaltrol, ferrous sulfate, gabapentin, synthroid, multi vit, protonix, potassium, requip, entresto, aldactone, carafate, carmol cr and warfarin    Deleted: None    Changed: None    Medication Affordability:  Not including over the counter (OTC) medications, was there a time in the past 3 months when you did not take your medications as prescribed because of cost?: No    Allergies reviewed with patient and updates made in EHR: yes    Medication History Completed By: Michaela Armenta 5/28/2023 1:33 PM    Prior to Admission medications    Medication Sig Last Dose Taking? Auth Provider Long Term End Date   acetaminophen (TYLENOL) 500 MG tablet Take 500 mg by mouth daily 5/27/2023 at am Yes Reported, Patient     allopurinol (ZYLOPRIM) 100 MG tablet Take 100 mg by mouth daily 5/27/2023 at hs Yes Reported, Patient     atorvastatin (LIPITOR) 10 MG tablet Take 10 mg by mouth daily 5/27/2023 at am Yes Reported, Patient Yes    bumetanide (BUMEX) 2 MG tablet Take 2 mg by mouth 2 times daily 2 tabs at 0800 and 1 tab at 1300 5/27/2023 at 1300 Yes Reported, Patient Yes    calcitRIOL (ROCALTROL) 0.25 MCG capsule Take 0.25 mcg by mouth daily 5/27/2023 at am Yes Reported, Patient Yes    ferrous sulfate (FEROSUL) 325 (65 Fe) MG tablet Take 325 mg by mouth daily (with breakfast) 5/27/2023 at am Yes Reported, Patient     gabapentin (NEURONTIN) 100 MG capsule Take 100 mg by mouth daily 5/27/2023 at am Yes Reported, Patient Yes    levothyroxine (SYNTHROID/LEVOTHROID) 100 MCG tablet  Take 100 mcg by mouth daily 5/27/2023 at am Yes Reported, Patient Yes    Multiple Vitamin (MULTIVITAMIN ADULT) TABS Take 1 tablet by mouth daily 5/27/2023 at am Yes Reported, Patient     OTHER MEDICAL SUPPLIES by Other route See Admin Instructions Diabetic shoe  Yes Reported, Patient     pantoprazole (PROTONIX) 40 MG EC tablet Take 40 mg by mouth 2 times daily (before meals) Past Month at am Yes Reported, Patient     potassium chloride ER (KLOR-CON M) 10 MEQ CR tablet Take 2 tablets by mouth daily with food 5/27/2023 at 1700 Yes Reported, Patient     rOPINIRole (REQUIP) 1 MG tablet Take 1 mg by mouth At Bedtime 5/27/2023 at hs Yes Reported, Patient Yes    sacubitril-valsartan (ENTRESTO) 24-26 MG per tablet Take 0.5 tablets by mouth 2 times daily 5/27/2023 at hs Yes Reported, Patient Yes    spironolactone (ALDACTONE) 25 MG tablet Take 0.5 mg by mouth daily Unknown at unknown Yes Reported, Patient Yes    sucralfate (CARAFATE) 1 GM tablet Take 1 g by mouth 2 times daily (with meals) 5/27/2023 at am Yes Reported, Patient     urea (CARMOL) 10 % external cream Apply 1 Application topically as needed Unknown at unknown Yes Reported, Patient     warfarin ANTICOAGULANT (COUMADIN) 4 MG tablet Take 4 mg by mouth daily 4 mg Monday, Wednesday, Friday and 6 mg the other 4 days 5/27/2023 at am Yes Reported, Patient

## 2023-05-28 NOTE — ED NOTES
"Pt having a hard time swallowing water, pt reports he feels like is get stuck and then has to \"throw it up\" then started to cough. Instructed pt to hold off on drinking water for now. Pt may need swallow study at some point   "

## 2023-05-28 NOTE — INTERVAL H&P NOTE
"I have reviewed the surgical (or preoperative) H&P that is linked to this encounter, and examined the patient. There are no significant changes    Clinical Conditions Present on Arrival:  Clinically Significant Risk Factors Present on Admission                # Drug Induced Coagulation Defect: home medication list includes an anticoagulant medication  # Thrombocytopenia: Lowest platelets = 126 in last 2 days, will monitor for bleeding  # Obesity: Estimated body mass index is 39.58 kg/m  as calculated from the following:    Height as of this encounter: 1.854 m (6' 1\").    Weight as of this encounter: 136.1 kg (300 lb).       "

## 2023-05-28 NOTE — PROGRESS NOTES
"[facility] ADMISSION NOTE    Patient admitted to room 2315 at approximately 12:59 PM   via cart from emergency room. Patient was accompanied by transport tech.     Verbal SBAR report received from RN prior to patient arrival.     Patient trasferred to bed via self. Patient alert and oriented X 3. The patient is not having any pain.  . Admission vital signs: Blood pressure 116/75, pulse 64, temperature 98.1  F (36.7  C), temperature source Oral, resp. rate 10, height 1.854 m (6' 1\"), weight 136.1 kg (300 lb), SpO2 94 %. Patient was oriented to plan of care, call light, bed controls, tv, telephone, bathroom and visiting hours.     Risk Assessment    The following safety risks were identified during admission: fall and skin. Yellow risk band applied: YES.     Skin Initial Assessment    This writer admitted this patient and completed a full skin assessment and Aly score in the Adult PCS flowsheet. Appropriate interventions initiated as needed.     Double nurse skin check unable to be done patient going down to surgery shortly after admission.         Education    Patient has a [place] to Observation order: Yes  Observation education completed and documented: Yes      Olive Tracy RN    "

## 2023-05-28 NOTE — ANESTHESIA CARE TRANSFER NOTE
Patient: Chacorta Mendoza    Procedure: Procedure(s):  ESOPHAGOGASTRODUODENOSCOPY, WITH FOREIGN BODY REMOVAL       Diagnosis: Foreign body in esophagus, initial encounter [T18.108A]  Diagnosis Additional Information: No value filed.    Anesthesia Type:   General     Note:    Oropharynx: oropharynx clear of all foreign objects  Level of Consciousness: awake  Oxygen Supplementation: face mask  Level of Supplemental Oxygen (L/min / FiO2): 8  Independent Airway: airway patency satisfactory and stable  Dentition: dentition unchanged  Vital Signs Stable: post-procedure vital signs reviewed and stable  Report to RN Given: handoff report given  Patient transferred to: PACU    Handoff Report: Identifed the Patient, Identified the Reponsible Provider, Reviewed the pertinent medical history, Discussed the surgical course, Reviewed Intra-OP anesthesia mangement and issues during anesthesia, Set expectations for post-procedure period and Allowed opportunity for questions and acknowledgement of understanding      Vitals:  Vitals Value Taken Time   /55 05/28/23 1635   Temp     Pulse 103 05/28/23 1636   Resp 16 05/28/23 1636   SpO2 92 % 05/28/23 1636   Vitals shown include unvalidated device data.    Electronically Signed By: EMILIO Simeon CRNA  May 28, 2023  4:37 PM

## 2023-05-28 NOTE — PHARMACY-ANTICOAGULATION SERVICE
Clinical Pharmacy - Warfarin Dosing Consult     Pharmacy has been consulted to manage this patient s warfarin therapy.  Indication: Atrial Fibrillation  Therapy Goal: INR 2-3  OP Anticoag Clinic: [facility]  Warfarin Prior to Admission: Yes  Warfarin PTA Regimen: 4 mg MWF and 6 mg ROW  Recent documented change in oral intake/nutrition: Unknown  Dose Comments: INR of 2.30, will give home dose of 6 mg    INR   Date Value Ref Range Status   05/28/2023 2.30 (H) 0.85 - 1.15 Final       Recommend warfarin 6 mg today.  Pharmacy will monitor Chacorta Mendoza daily and order warfarin doses to achieve specified goal.      Please contact pharmacy as soon as possible if the warfarin needs to be held for a procedure or if the warfarin goals change.

## 2023-05-28 NOTE — ED NOTES
Observation Brochure    Patient and family informed of observation status based on provider's order.  Observation brochure was given. Kelly Belle RN

## 2023-05-28 NOTE — ED PROVIDER NOTES
History     Chief Complaint   Patient presents with     Shortness of Breath     HPI   History per patient who is a poor historian, his son who is assisting with history and no symptoms past medical history review of [facility] EMR and Care Everywhere EMR, including extensive chart review of prior cardiology clinic notes, prior/last echocardiogram and baseline laboratory evaluations.    Chacorta Mendoza is a 87 year old male with history of systolic heart failure, diastolic heart failure, atrial fibrillation with chronic anticoagulation on Coumadin, status post bioprosthetic aortic valve replacement, status post pacemaker placement, stage IV chronic kidney disease and obesity who presents emergency department for shortness of breath and cough productive of clear and green sputum, which began yesterday.  He has become very dyspneic and is having difficulty ambulating in his home, unable to walk more than 10 steps or so with his walker, and gets lightheaded and dizzy with exertion.  No fever or chills.  No hemoptysis, leg pain or acute leg swelling.  No vomiting or abdominal pain. No recent travel or known infectious exposures.  He lives with his son who is assisting with history.    Previous Records in Care Everywhere were Reviewed:   [facility]    [address], [address], [place] [address]    Main: [phone]  [email or web address]     10/04/2022  Transthoracic Echo Report    [facility]    Location: Inpatient (Portable) Rhythm: Irregular    Procedure Components: 2D imaging with contrast, Color Doppler, Spectral Doppler    Indications:CHF, unspecified or secondary right CHF    Technical Quality: Technically difficult study Contrast: Definity      Final Conclusion Previous Study: 05/20/2022    Visually Estimated EF: 35-40%    LV not well visualized. Apical hypkoinesis.    S/P 29 mm Donnell 3 prosthetic Aortic valve. Mean aortic transvalvular gradient is 14  mmHg.  No  significant aortic regurgitation.    Mild eccentric mitral regurgitation.    Estimated pulmonary artery pressure of 34 mmHg + RA pressure.    Per the ACC/AHA guidelines, SBE prophylaxis would be recommended.       Allergies:  Allergies   Allergen Reactions     Levofloxacin Muscle Pain (Myalgia) and Other (See Comments)     Other reaction(s): Myalgias  Muscle Pain  Muscle Pain  Muscle Pain       Lisinopril Other (See Comments)     Changed to ARB due to rising creatinine  Changed to ARB due to rising creatinine  Changed to ARB due to rising creatinine         Problem List:    Patient Active Problem List    Diagnosis Date Noted     Failure to thrive in adult 05/28/2023     Priority: Medium     Secondary hyperparathyroidism (H) 04/27/2023     Priority: Medium     Chronic kidney disease (CKD), stage IV (severe) (H) 03/20/2023     Priority: Medium     Gastric outlet obstruction 03/20/2023     Priority: Medium     Current use of long term anticoagulation 04/14/2021     Priority: Medium     Chronic diastolic heart failure (H) 05/06/2020     Priority: Medium     Formatting of this note might be different from the original.  In 2001, LVEF 38%  In 2003, LVEF 55-65% after cardioversion and maintenance of normal sinus rhythm  2000 normal coronary angiography       Pacemaker 01/09/2020     Priority: Medium     Formatting of this note might be different from the original.  Date of last device in office evaluation: 2/2/2023  Following clinic: [facility]       Heart block 12/27/2019     Priority: Medium     LBBB (left bundle branch block) 11/10/2019     Priority: Medium     Formatting of this note might be different from the original.    noted after TAVR   -Toprol XL 25mg daily resumed at discharge from hospital       Spondylosis of lumbar region without myelopathy or radiculopathy 03/27/2019     Priority: Medium     Formatting of this note might be different from the original.  2015 MRI L5 vertebroplasty . At the L4-5 level,  . There is  moderate central canal narrowing.   4/2021 CT lumbar   Progressive central canal stenosis L3-L4 which is moderate to moderately severe.   Mild canal stenosis L2-L3 is stable with mild to moderate canal stenosis L4-L5, also stable.  4/2021 S/P epidural       Essential hypertension 03/24/2019     Priority: Medium     Gout 03/03/2018     Priority: Medium     History of aortic valve replacement 03/03/2018     Priority: Medium     Formatting of this note might be different from the original.  Dr Perrin   Severe aortic stenosis - status post TAVR with Donnell S3 29 mm valve on 09/25/2019  Formatting of this note might be different from the original.  Dr Perrin  9/25/2020  Severe aortic stenosis - status post TAVR     Pericardial effusion post-TAVR, status post pericardiocentesis       Type 2 diabetes mellitus (H) 03/03/2018     Priority: Medium     Formatting of this note might be different from the original.  Chronic kidney disease  Formatting of this note might be different from the original.  Chronic kidney disease       Restless leg syndrome 05/15/2016     Priority: Medium     Morbid exogenous obesity (H) 06/22/2012     Priority: Medium     Formatting of this note might be different from the original.       JAZMIN (obstructive sleep apnea) 05/17/2011     Priority: Medium     Formatting of this note might be different from the original.  Does not use CPAP       Hypercholesterolemia 01/05/2011     Priority: Medium     Formatting of this note might be different from the original.       Macrocytic anemia 10/22/2008     Priority: Medium     Formatting of this note might be different from the original.  Dr Parisa Vargas  Anemia of ckd and iron def  consider EPO growth factor support if hemoglobin is below 10.   2/ 2022 IRON INFUSION.    Formatting of this note might be different from the original.  Dr Parisa Vargas   Anemia of ckd and iron def  Low platelets can be secondary to consumption    Bone marrow biopsy on 11/29/2021 revealed  "hypercellular bone marrow with appropriate trilineage hematopoiesis, no evidence of leukemia and decreased iron stores cytogenetics normal  Hepatitis profile -ve ,B12 and folic acid level normal ,Ferritin at 52 low    - consider EPO growth factor support if hemoglobin is below 10. at this point hb 10.5  -on iv iron venofer    Formatting of this note might be different from the original.  Blood morphology on 10/15/08 showed no hemolysis and morphologic features of myelodysplasia were not seen.   TSH, B12, RBC folate normal 10/08.       Paroxysmal atrial fibrillation (H) 05/04/2007     Priority: Medium     Formatting of this note might be different from the original.  Cardioverted- failed.  Converted in higher dose amiodarone.       Hypothyroidism (acquired) 03/28/2007     Priority: Medium     Formatting of this note might be different from the original.  Amiodarone related  Negative thyroid autoantibodies          Past Medical History:    No past medical history on file.    Past Surgical History:    No past surgical history on file.    Family History:    No family history on file.    Social History:  Marital Status:   [5]        Medications:    No current outpatient medications on file.      Review of Systems  As mentioned in the HPI, in addition focused review of systems was negative.    Physical Exam   BP: 136/75  Pulse: 84  Temp: 98.1  F (36.7  C)  Resp: 22  Height: 185.4 cm (6' 1\")  Weight: 136.1 kg (300 lb)  SpO2: 99 %      Physical Exam  Vitals and nursing note reviewed.   Constitutional:       General: He is not in acute distress.     Appearance: Normal appearance. He is well-developed. He is obese. He is not ill-appearing or diaphoretic.   HENT:      Head: Normocephalic and atraumatic.   Eyes:      General: No scleral icterus.     Extraocular Movements: Extraocular movements intact.      Conjunctiva/sclera: Conjunctivae normal.   Neck:      Trachea: No tracheal deviation.   Cardiovascular:      Rate " and Rhythm: Normal rate and regular rhythm.      Heart sounds: Normal heart sounds. No murmur heard.     No friction rub. No gallop.   Pulmonary:      Effort: Pulmonary effort is normal. No respiratory distress.      Breath sounds: Rales ( right base) present. No wheezing or rhonchi.   Abdominal:      General: There is no distension.      Palpations: Abdomen is soft.      Tenderness: There is no abdominal tenderness.   Musculoskeletal:         General: No tenderness. Normal range of motion.      Cervical back: Normal range of motion and neck supple.      Right lower leg: No tenderness. Edema present.      Left lower leg: No tenderness. Edema present.   Skin:     General: Skin is warm and dry.      Coloration: Skin is not pale.      Findings: No erythema or rash.   Neurological:      General: No focal deficit present.      Mental Status: He is alert and oriented to person, place, and time.      Coordination: Coordination normal.   Psychiatric:         Mood and Affect: Mood normal.         Behavior: Behavior normal.         ED Course           Procedures              EKG Interpretation:      Interpreted by Jona Mcnamara MD  Time reviewed: Upon completion  Symptoms at time of EKG: Shortness of breath  Rhythm: Wide-complex rhythm, suspect ventricular paced rhythm  Rate: 60  Axis: normal  Ectopy: Single PVC  Conduction: Left bundle branch block, left axis deviation  ST Segments/ T Waves: No acute appearing or ischemic appearing ST-T wave changes  Q Waves: Anterior and lead III  Comparison to prior: No old EKG available  Clinical Impression: Wide-complex regular rhythm, probable ventricular pacing with rate 60, no apparent ischemic changes         Results for orders placed or performed during the hospital encounter of 05/28/23 (from the past 24 hour(s))   CBC with platelets, differential    Narrative    The following orders were created for panel order CBC with platelets, differential.  Procedure                                Abnormality         Status                     ---------                               -----------         ------                     CBC with platelets and d...[[id number]]  Abnormal            Final result                 Please view results for these tests on the individual orders.   Comprehensive metabolic panel   Result Value Ref Range    Sodium 143 136 - 145 mmol/L    Potassium 3.5 3.4 - 5.3 mmol/L    Chloride 101 98 - 107 mmol/L    Carbon Dioxide (CO2) 27 22 - 29 mmol/L    Anion Gap 15 7 - 15 mmol/L    Urea Nitrogen 48.7 (H) 8.0 - 23.0 mg/dL    Creatinine 1.92 (H) 0.67 - 1.17 mg/dL    Calcium 9.7 8.8 - 10.2 mg/dL    Glucose 143 (H) 70 - 99 mg/dL    Alkaline Phosphatase 85 40 - 129 U/L    AST 19 10 - 50 U/L    ALT 11 10 - 50 U/L    Protein Total 7.7 6.4 - 8.3 g/dL    Albumin 4.0 3.5 - 5.2 g/dL    Bilirubin Total 0.8 <=1.2 mg/dL    GFR Estimate 34 (L) >60 mL/min/1.73m2   [facility] Draw    Narrative    The following orders were created for panel order [facility] Draw.  Procedure                               Abnormality         Status                     ---------                               -----------         ------                     Extra Blue Top Tube[[id number]]                              Final result               Extra Red Top Tube[[id number]]                               Final result               Extra Heparinized Syringe[[id number]]                        Final result               Extra Green Top (Lithium...[[id number]]                      Final result                 Please view results for these tests on the individual orders.   Troponin T, High Sensitivity   Result Value Ref Range    Troponin T, High Sensitivity 42 (H) <=22 ng/L   CBC with platelets and differential   Result Value Ref Range    WBC Count 6.6 4.0 - 11.0 10e3/uL    RBC Count 3.40 (L) 4.40 - 5.90 10e6/uL    Hemoglobin 10.6 (L) 13.3 - 17.7 g/dL    Hematocrit 32.9 (L) 40.0 - 53.0 %    MCV 97 78 - 100 fL    MCH 31.2 26.5 - 33.0 pg     MCHC 32.2 31.5 - 36.5 g/dL    RDW 15.2 (H) 10.0 - 15.0 %    Platelet Count 126 (L) 150 - 450 10e3/uL    % Neutrophils 76 %    % Lymphocytes 14 %    % Monocytes 6 %    % Eosinophils 3 %    % Basophils 0 %    % Immature Granulocytes 1 %    NRBCs per 100 WBC 0 <1 /100    Absolute Neutrophils 5.0 1.6 - 8.3 10e3/uL    Absolute Lymphocytes 1.0 0.8 - 5.3 10e3/uL    Absolute Monocytes 0.4 0.0 - 1.3 10e3/uL    Absolute Eosinophils 0.2 0.0 - 0.7 10e3/uL    Absolute Basophils 0.0 0.0 - 0.2 10e3/uL    Absolute Immature Granulocytes 0.0 <=0.4 10e3/uL    Absolute NRBCs 0.0 10e3/uL   Extra Blue Top Tube   Result Value Ref Range    Hold Specimen JIC    Extra Red Top Tube   Result Value Ref Range    Hold Specimen JIC    Extra Heparinized Syringe   Result Value Ref Range    Hold Specimen JIC    Extra Green Top (Lithium Heparin) ON ICE   Result Value Ref Range    Hold Specimen JIC    NT pro BNP   Result Value Ref Range    N terminal Pro BNP Inpatient 2,588 (H) 0 - 1,800 pg/mL   INR   Result Value Ref Range    INR 2.30 (H) 0.85 - 1.15   Symptomatic Influenza A/B, RSV, & SARS-CoV2 PCR (COVID-19) Nose    Specimen: Nose; Swab   Result Value Ref Range    Influenza A PCR Negative Negative    Influenza B PCR Negative Negative    RSV PCR Negative Negative    SARS CoV2 PCR Negative Negative    Narrative    Testing was performed using the Xpert Xpress CoV2/Flu/RSV Assay on the [employer] GeneXpert Instrument. This test should be ordered for the detection of SARS-CoV-2, influenza, and RSV viruses in individuals who meet clinical and/or epidemiological criteria. Test performance is unknown in asymptomatic patients. This test is for in vitro diagnostic use under the FDA EUA for laboratories certified under CLIA to perform high or moderate complexity testing. This test has not been FDA cleared or approved. A negative result does not rule out the presence of PCR inhibitors in the specimen or target RNA in concentration below the limit of detection for  the assay. If only one viral target is positive but coinfection with multiple targets is suspected, the sample should be re-tested with another FDA cleared, approved, or authorized test, if coinfection would change clinical management. This test was validated by the [facility]. These laboratories are certified under the Clinical Laboratory Improvement Amendments of 1988 (CLIA-88) as qualified to perform high complexity laboratory testing.   XR Chest 2 Views    Narrative    EXAM: XR CHEST 2 VIEWS  LOCATION: [facility]  DATE/TIME: 05/28/2023, 8:25 AM CDT    INDICATION: Productive cough and short of air.  COMPARISON: None.      Impression    IMPRESSION: Cardiomegaly. Patchy bibasilar opacities identified. Pneumonia is possible. Mild bilateral vascular and interstitial prominence that may be a mild degree of edema.     Troponin T, High Sensitivity   Result Value Ref Range    Troponin T, High Sensitivity 41 (H) <=22 ng/L     I independently reviewed the X-rays: Agree with the Radiologist's interpretation.      Medications   azithromycin (ZITHROMAX) 500 mg in sodium chloride 0.9 % 250 mL intermittent infusion (500 mg Intravenous $New Bag 5/28/23 1028)   cefTRIAXone (ROCEPHIN) 2 g vial to attach to  ml bag for ADULTS or NS 50 ml bag for PEDS (0 g Intravenous Stopped 5/28/23 1006)   ondansetron (ZOFRAN) injection 4 mg (4 mg Intravenous $Given 5/28/23 1028)       7:49 AM - Troponin mildly elevated, no prior troponins are present in review of [facility] EMR.  EKG shows no ischemic changes and I doubt ACS or AMI.  Troponin probably elevated due to demand strain and chronic kidney disease. I will repeat troponin in 2 hours.  I reviewed care everywhere EMR and last troponin 3/20/2023 was within normal but prior to this he has had mildly elevated troponins on 5/19/2022, benign in nature.    11:05 AM - I reviewed the case with the Hospitalist on-call, Hospitalist on-call Dr. Valencia who  accepted his care upon admission here.      Assessments & Plan (with Medical Decision Making)   87 year old male with history of systolic heart failure, diastolic heart failure, atrial fibrillation with chronic anticoagulation on Coumadin, status post bioprosthetic aortic valve replacement, status post pacemaker placement, stage IV chronic kidney disease and obesity who presents emergency department for shortness of breath and cough productive of clear and green sputum, which began yesterday.  He has become very dyspneic and is having difficulty ambulating in his home, unable to walk more than 10 steps or so with his walker, and gets lightheaded and dizzy with exertion.  No respiratory distress or hypoxia.  Chest x-ray shows ? basilar infiltrate but he has no fever and no elevation of WBC, this also could be secondary to pulmonary edema/CHF. He was given IV ceftriaxone and azithromycin for possible early community-acquired pneumonia.  BNP is elevated and he may also have a component of CHF exacerbation.  I do not feel symptoms represent atypical ACS and I doubt PE or DVT.  No chest pain, no apparent ischemic changes on EKG and relatively mildly of elevated stable troponins are probably due to demand strain, chronic kidney disease and CHF.  He is too weak and dyspneic on exertion to be discharged home with son and he will be admitted to the hospital service for continued care and evaluation.      I have reviewed the nursing notes.    I have reviewed the findings, diagnosis, plan and need for follow up with the patient.    Medical Decision Making: High complexity      New Prescriptions    No medications on file       Final diagnoses:   Generalized weakness   Dyspnea on exertion   Chronic combined systolic and diastolic congestive heart failure (H)   Acute bronchitis, unspecified organism - Versus early community-acquired pneumonia   Failure to thrive in adult   Chronic kidney disease (CKD), stage IV (severe) (H)    Paroxysmal atrial fibrillation (H)   Current use of long term anticoagulation   History of aortic valve replacement   Morbid exogenous obesity (H)       5/28/2023   [facility] EMERGENCY DEPT     Jona Mcnamara MD  06/01/23 [id number]

## 2023-05-28 NOTE — ED NOTES
"[facility]   Admission Handoff    The patient is Chacorta Mendoza, 87 year old who arrived in the ED by AMBULANCE from home with a complaint of Shortness of Breath  . The patient's current symptoms are new and during this time the symptoms have remained the same. In the ED, patient was diagnosed with   Final diagnoses:   None         Needed?: No    Allergies:    Allergies   Allergen Reactions    Levofloxacin Muscle Pain (Myalgia) and Other (See Comments)     Other reaction(s): Myalgias  Muscle Pain  Muscle Pain  Muscle Pain      Lisinopril Other (See Comments)     Changed to ARB due to rising creatinine  Changed to ARB due to rising creatinine  Changed to ARB due to rising creatinine         Past Medical Hx: No past medical history on file.    Initial vitals were: BP: 136/75  Pulse: 84  Temp: 98.1  F (36.7  C)  Resp: 22  Height: 185.4 cm (6' 1\")  Weight: 136.1 kg (300 lb)  SpO2: 99 %   Recent vital Signs: /60   Pulse 61   Temp 98.1  F (36.7  C) (Oral)   Resp 10   Ht 1.854 m (6' 1\")   Wt 136.1 kg (300 lb)   SpO2 96%   BMI 39.58 kg/m      Elimination Status: Continent: Yes     Activity Level: SBA    Fall Status: Reason for falls risk:  Mobility  arm band in place    Baseline Mental status: WDL  Current Mental Status changes: at basesline    Infection present or suspected this encounter: yes respiratory  Sepsis suspected: No    Isolation type: None    Bariatric equipment needed?: No    In the ED these meds were given:   Medications   azithromycin (ZITHROMAX) 500 mg in sodium chloride 0.9 % 250 mL intermittent infusion (500 mg Intravenous $New Bag 5/28/23 1028)   cefTRIAXone (ROCEPHIN) 2 g vial to attach to  ml bag for ADULTS or NS 50 ml bag for PEDS (0 g Intravenous Stopped 5/28/23 1006)   ondansetron (ZOFRAN) injection 4 mg (4 mg Intravenous $Given 5/28/23 1028)       Drips running?  Yes    Home pump  No    Current LDAs: Peripheral IV: Site ELIJAH; Gauge 18  none "     Results:   Labs/Imaging  Ordered and Resulted from Time of ED Arrival Up to the Time of Departure from the ED  Results for orders placed or performed during the hospital encounter of 05/28/23 (from the past 24 hour(s))   CBC with platelets, differential    Narrative    The following orders were created for panel order CBC with platelets, differential.  Procedure                               Abnormality         Status                     ---------                               -----------         ------                     CBC with platelets and d...[[id number]]  Abnormal            Final result                 Please view results for these tests on the individual orders.   Comprehensive metabolic panel   Result Value Ref Range    Sodium 143 136 - 145 mmol/L    Potassium 3.5 3.4 - 5.3 mmol/L    Chloride 101 98 - 107 mmol/L    Carbon Dioxide (CO2) 27 22 - 29 mmol/L    Anion Gap 15 7 - 15 mmol/L    Urea Nitrogen 48.7 (H) 8.0 - 23.0 mg/dL    Creatinine 1.92 (H) 0.67 - 1.17 mg/dL    Calcium 9.7 8.8 - 10.2 mg/dL    Glucose 143 (H) 70 - 99 mg/dL    Alkaline Phosphatase 85 40 - 129 U/L    AST 19 10 - 50 U/L    ALT 11 10 - 50 U/L    Protein Total 7.7 6.4 - 8.3 g/dL    Albumin 4.0 3.5 - 5.2 g/dL    Bilirubin Total 0.8 <=1.2 mg/dL    GFR Estimate 34 (L) >60 mL/min/1.73m2   [place] Draw    Narrative    The following orders were created for panel order [place] Draw.  Procedure                               Abnormality         Status                     ---------                               -----------         ------                     Extra Blue Top Tube[[id number]]                              Final result               Extra Red Top Tube[[id number]]                               Final result               Extra Heparinized Syringe[[id number]]                        Final result               Extra Green Top (Lithium...[[id number]]                      Final result                 Please view results for these tests on the  individual orders.   Troponin T, High Sensitivity   Result Value Ref Range    Troponin T, High Sensitivity 42 (H) <=22 ng/L   CBC with platelets and differential   Result Value Ref Range    WBC Count 6.6 4.0 - 11.0 10e3/uL    RBC Count 3.40 (L) 4.40 - 5.90 10e6/uL    Hemoglobin 10.6 (L) 13.3 - 17.7 g/dL    Hematocrit 32.9 (L) 40.0 - 53.0 %    MCV 97 78 - 100 fL    MCH 31.2 26.5 - 33.0 pg    MCHC 32.2 31.5 - 36.5 g/dL    RDW 15.2 (H) 10.0 - 15.0 %    Platelet Count 126 (L) 150 - 450 10e3/uL    % Neutrophils 76 %    % Lymphocytes 14 %    % Monocytes 6 %    % Eosinophils 3 %    % Basophils 0 %    % Immature Granulocytes 1 %    NRBCs per 100 WBC 0 <1 /100    Absolute Neutrophils 5.0 1.6 - 8.3 10e3/uL    Absolute Lymphocytes 1.0 0.8 - 5.3 10e3/uL    Absolute Monocytes 0.4 0.0 - 1.3 10e3/uL    Absolute Eosinophils 0.2 0.0 - 0.7 10e3/uL    Absolute Basophils 0.0 0.0 - 0.2 10e3/uL    Absolute Immature Granulocytes 0.0 <=0.4 10e3/uL    Absolute NRBCs 0.0 10e3/uL   Extra Blue Top Tube   Result Value Ref Range    Hold Specimen JIC    Extra Red Top Tube   Result Value Ref Range    Hold Specimen JIC    Extra Heparinized Syringe   Result Value Ref Range    Hold Specimen JIC    Extra Green Top (Lithium Heparin) ON ICE   Result Value Ref Range    Hold Specimen JIC    NT pro BNP   Result Value Ref Range    N terminal Pro BNP Inpatient 2,588 (H) 0 - 1,800 pg/mL   INR   Result Value Ref Range    INR 2.30 (H) 0.85 - 1.15   Symptomatic Influenza A/B, RSV, & SARS-CoV2 PCR (COVID-19) Nose    Specimen: Nose; Swab   Result Value Ref Range    Influenza A PCR Negative Negative    Influenza B PCR Negative Negative    RSV PCR Negative Negative    SARS CoV2 PCR Negative Negative    Narrative    Testing was performed using the Xpert Xpress CoV2/Flu/RSV Assay on the [employer] Instrument. This test should be ordered for the detection of SARS-CoV-2, influenza, and RSV viruses in individuals who meet clinical and/or epidemiological criteria.  Test performance is unknown in asymptomatic patients. This test is for in vitro diagnostic use under the FDA EUA for laboratories certified under CLIA to perform high or moderate complexity testing. This test has not been FDA cleared or approved. A negative result does not rule out the presence of PCR inhibitors in the specimen or target RNA in concentration below the limit of detection for the assay. If only one viral target is positive but coinfection with multiple targets is suspected, the sample should be re-tested with another FDA cleared, approved, or authorized test, if coinfection would change clinical management. This test was validated by the [facility] [employer]. These laboratories are certified under the Clinical Laboratory Improvement Amendments of 1988 (CLIA-88) as qualified to perform high complexity laboratory testing.   XR Chest 2 Views    Narrative    EXAM: XR CHEST 2 VIEWS  LOCATION: [facility]  DATE/TIME: 05/28/2023, 8:25 AM CDT    INDICATION: Productive cough and short of air.  COMPARISON: None.      Impression    IMPRESSION: Cardiomegaly. Patchy bibasilar opacities identified. Pneumonia is possible. Mild bilateral vascular and interstitial prominence that may be a mild degree of edema.     Troponin T, High Sensitivity   Result Value Ref Range    Troponin T, High Sensitivity 41 (H) <=22 ng/L       For the majority of the shift this patient's behavior was Green     Cardiac Rhythm: N/A  Pt needs tele? No  Skin/wound Issues:  Frail skin, bruising of various healing degrees, lower extremity redness  Right lower extremity wound, dressing is clean dry and intact at this time     Code Status: Full Code    Pain control: good    Nausea control: fair    Abnormal labs/tests/findings requiring intervention: BNP 2588, trop 41, Xray shows pneumonia    Patient tested for COVID 19 prior to admission: NO     OBS brochure/video discussed/provided to patient/family: Yes      Family present during ED course? Yes     Family Comments/Social Situation comments: home, lives alone    Tasks needing completion: None    Dianna Brandon RN

## 2023-05-28 NOTE — PROGRESS NOTES
Bear tick pulled off the left back of the patient. Head intact, small amount of skin taken with the tick. Bandaid to area.

## 2023-05-28 NOTE — ED NOTES
"Pt given yogurt and apple juice to drink, pt states that he then started to feel nauseated, and 'couldn't get anymore down\",  pt denies issues with swallowing in the past, and states he just feels \"full all the way up\" and can't get more down.  Pt medicated for nausea and second antibiotic started.  Pt c/o sob at rest.  Pt is obese with large abd, and sitting high upright.  Put bed in a little reverse trendelenburg to help with ease of breathing.    PLAN:  Keep an eye on pt, MD aware.  Plan to admit pt.   "

## 2023-05-28 NOTE — CONSULTS
[facility] Emergency Department Surgery Consult    Chacorta Mendoza MRN# [id number]   Age: 87 year old YOB: 1935     Date of Admission:  5/28/2023    Reason for consult: Foreign body in esophagus, initial encounter [T18.108A]       Requesting physician: Dr. Betancur       Level of consult: Consult, follow and place orders           Impression and Plan:   Impression:   Foreign body in esophagus, initial encounter [T18.108A]        Plan:   EGD with foreign body removal.  The INR will not be reversed as there is no plan for biopsies.           Chief Complaint:   Foreign body in esophagus, initial encounter [T18.108A]     History is obtained from the patient         History of Present Illness:   This 87 year old male is being seen at this time for evaluation for EGD.  Last night he was eating meatloaf and potatoes.  After that he was unable to pass his secretions.  He tried to wash it down with a glass of water and that was also unsuccessful.  He is not on a PPI.  He thinks he had an EGD many years ago but cannot recall.  I was able to locate one in the [facility] system done in March of this year that revealed grade D esophagitis but no stricture.  He cannot recall ever having a dilation.  He did not follow the antireflux regimen recommended at that time.  I am now asked to see him for foreign body in the esophagus.           Past Medical History:   No past medical history on file.          Past Surgical History:   No past surgical history on file.          Social History:     Social History     Tobacco Use     Smoking status: Not on file     Smokeless tobacco: Not on file   Substance Use Topics     Alcohol use: Not on file             Family History:   No family history on file.           Allergies:     Allergies   Allergen Reactions     Levofloxacin Muscle Pain (Myalgia) and Other (See Comments)     Other reaction(s): Myalgias  Muscle Pain  Muscle Pain  Muscle Pain       Lisinopril Other (See Comments)      Changed to ARB due to rising creatinine  Changed to ARB due to rising creatinine  Changed to ARB due to rising creatinine               Medications:     Current Facility-Administered Medications   Medication     lidocaine (LMX4) kit     lidocaine 1 % 0.1-1 mL     sodium chloride (PF) 0.9% PF flush 3 mL     sodium chloride (PF) 0.9% PF flush 3 mL             Review of Systems:   The review of systems was positive for the following findings.  None.  The remainder of the review of systems was unremarkable.          Physical Exam:   PE:  Vitals:    05/28/23 1145 05/28/23 1200 05/28/23 1230 05/28/23 1351   BP: 132/62 133/71 116/75 114/47   BP Location:    Left arm   Pulse: 63 63 64 62   Resp:    18   Temp:    97.6  F (36.4  C)   TempSrc:    Oral   SpO2: 95% 94% 94% 95%   Weight:       Height:         General: well developed, well nourished WM who appears their stated age  HEENT: NC/AT, EOMI, (-)icterus, (-)injection  Neck: Supple, No JVD  Chest: CTA  Heart: irreg with click  Abd: Soft, non tender, non distended, non tender, no masses  Ext; Warm, no edema  Psych: AAOx3  Neuro: No focal deficits            Data:   All laboratory data reviewed      Octavio Ambriz MBBS - 03/21/2023 10:36 AM CDT   Formatting of this note might be different from the original.   [facility]   _______________________________________________________________________________   Patient Name: Chacorta Mendoza             Procedure Date: 3/21/2023   MRN: [id number]                       Gender: Male   Account Number: [id number]             YOB: 1935   Admit Type: Inpatient                   _______________________________________________________________________________     Procedure:                    Upper GI endoscopy   Proceduralist:                Octavio Ambriz MD   Indications/Pre-Op Diagnosis: Abnormal CT of the GI tract   Medications:                  Monitored Anesthesia Care     Procedure Description:         Risk of bleeding, infection, perforation, need for surgery and        alternatives discussed.        The endoscope [id number] was introduced through the mouth, and        advanced to the second part of duodenum. The upper GI endoscopy was        accomplished without difficulty. The patient tolerated the procedure        well.     Complications:                No immediate complications.   Estimated Blood Loss & Specimen:        Estimated blood loss: none.        Specimen collected: None     Findings:        LA Grade D (one or more mucosal breaks involving at least 75% of        esophageal circumference) esophagitis with no bleeding was found in the        lower to mid third of the esophagus.        A small hiatal hernia was present.        The examined duodenum was normal.     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        - Small hiatal hernia.        - Normal examined duodenum.        - No specimens collected.     Recommendation:        - Return patient to hospital [facility] for ongoing care.        - Diet as tolerated        - Pantoprazole 40 mg 1po twice daily X 3 months        - Add Carafate 1 gm BID X 4 weeks        - Repeat EGD in 2 months to reassess the esophagitis        - Acid reflux measures        - Follow up in the [facility] in 4 weeks               Results for orders placed or performed during the hospital encounter of 05/28/23 (from the past 24 hour(s))   CBC with platelets, differential    Narrative    The following orders were created for panel order CBC with platelets, differential.  Procedure                               Abnormality         Status                     ---------                               -----------         ------                     CBC with platelets and d...[[id number]]  Abnormal            Final result                 Please view results for these tests on the individual orders.   Comprehensive metabolic panel   Result  Value Ref Range    Sodium 143 136 - 145 mmol/L    Potassium 3.5 3.4 - 5.3 mmol/L    Chloride 101 98 - 107 mmol/L    Carbon Dioxide (CO2) 27 22 - 29 mmol/L    Anion Gap 15 7 - 15 mmol/L    Urea Nitrogen 48.7 (H) 8.0 - 23.0 mg/dL    Creatinine 1.92 (H) 0.67 - 1.17 mg/dL    Calcium 9.7 8.8 - 10.2 mg/dL    Glucose 143 (H) 70 - 99 mg/dL    Alkaline Phosphatase 85 40 - 129 U/L    AST 19 10 - 50 U/L    ALT 11 10 - 50 U/L    Protein Total 7.7 6.4 - 8.3 g/dL    Albumin 4.0 3.5 - 5.2 g/dL    Bilirubin Total 0.8 <=1.2 mg/dL    GFR Estimate 34 (L) >60 mL/min/1.73m2   [place] Draw    Narrative    The following orders were created for panel order [place] Draw.  Procedure                               Abnormality         Status                     ---------                               -----------         ------                     Extra Blue Top Tube[[id number]]                              Final result               Extra Red Top Tube[[id number]]                               Final result               Extra Heparinized Syringe[[id number]]                        Final result               Extra Green Top (Lithium...[[id number]]                      Final result                 Please view results for these tests on the individual orders.   Troponin T, High Sensitivity   Result Value Ref Range    Troponin T, High Sensitivity 42 (H) <=22 ng/L   CBC with platelets and differential   Result Value Ref Range    WBC Count 6.6 4.0 - 11.0 10e3/uL    RBC Count 3.40 (L) 4.40 - 5.90 10e6/uL    Hemoglobin 10.6 (L) 13.3 - 17.7 g/dL    Hematocrit 32.9 (L) 40.0 - 53.0 %    MCV 97 78 - 100 fL    MCH 31.2 26.5 - 33.0 pg    MCHC 32.2 31.5 - 36.5 g/dL    RDW 15.2 (H) 10.0 - 15.0 %    Platelet Count 126 (L) 150 - 450 10e3/uL    % Neutrophils 76 %    % Lymphocytes 14 %    % Monocytes 6 %    % Eosinophils 3 %    % Basophils 0 %    % Immature Granulocytes 1 %    NRBCs per 100 WBC 0 <1 /100    Absolute Neutrophils 5.0 1.6 - 8.3 10e3/uL    Absolute  Lymphocytes 1.0 0.8 - 5.3 10e3/uL    Absolute Monocytes 0.4 0.0 - 1.3 10e3/uL    Absolute Eosinophils 0.2 0.0 - 0.7 10e3/uL    Absolute Basophils 0.0 0.0 - 0.2 10e3/uL    Absolute Immature Granulocytes 0.0 <=0.4 10e3/uL    Absolute NRBCs 0.0 10e3/uL   Extra Blue Top Tube   Result Value Ref Range    Hold Specimen JIC    Extra Red Top Tube   Result Value Ref Range    Hold Specimen JIC    Extra Heparinized Syringe   Result Value Ref Range    Hold Specimen JIC    Extra Green Top (Lithium Heparin) ON ICE   Result Value Ref Range    Hold Specimen JIC    NT pro BNP   Result Value Ref Range    N terminal Pro BNP Inpatient 2,588 (H) 0 - 1,800 pg/mL   INR   Result Value Ref Range    INR 2.30 (H) 0.85 - 1.15   Symptomatic Influenza A/B, RSV, & SARS-CoV2 PCR (COVID-19) Nose    Specimen: Nose; Swab   Result Value Ref Range    Influenza A PCR Negative Negative    Influenza B PCR Negative Negative    RSV PCR Negative Negative    SARS CoV2 PCR Negative Negative    Narrative    Testing was performed using the Xpert Xpress CoV2/Flu/RSV Assay on the [employer] Instrument. This test should be ordered for the detection of SARS-CoV-2, influenza, and RSV viruses in individuals who meet clinical and/or epidemiological criteria. Test performance is unknown in asymptomatic patients. This test is for in vitro diagnostic use under the FDA EUA for laboratories certified under CLIA to perform high or moderate complexity testing. This test has not been FDA cleared or approved. A negative result does not rule out the presence of PCR inhibitors in the specimen or target RNA in concentration below the limit of detection for the assay. If only one viral target is positive but coinfection with multiple targets is suspected, the sample should be re-tested with another FDA cleared, approved, or authorized test, if coinfection would change clinical management. This test was validated by the [facility] [employer]. These laboratories are  certified under the Clinical Laboratory Improvement Amendments of 1988 (CLIA-88) as qualified to perform high complexity laboratory testing.   XR Chest 2 Views    Narrative    EXAM: XR CHEST 2 VIEWS  LOCATION: [facility]  DATE/TIME: 05/28/2023, 8:25 AM CDT    INDICATION: Productive cough and short of air.  COMPARISON: None.      Impression    IMPRESSION: Cardiomegaly. Patchy bibasilar opacities identified. Pneumonia is possible. Mild bilateral vascular and interstitial prominence that may be a mild degree of edema.     Troponin T, High Sensitivity   Result Value Ref Range    Troponin T, High Sensitivity 41 (H) <=22 ng/L     All imaging studies reviewed by me.     John Carrizales MD, FACS

## 2023-05-29 ENCOUNTER — APPOINTMENT (OUTPATIENT)
Dept: GENERAL RADIOLOGY | Facility: CLINIC | Age: 88
DRG: 394 | End: 2023-05-29
Attending: INTERNAL MEDICINE
Payer: MEDICARE

## 2023-05-29 ENCOUNTER — HOSPITAL ENCOUNTER (INPATIENT)
Facility: CLINIC | Age: 88
LOS: 9 days | Discharge: NURSING, REHAB OR LONG-TERM CARE | DRG: 393 | End: 2023-06-07
Attending: INTERNAL MEDICINE | Admitting: INTERNAL MEDICINE
Payer: MEDICARE

## 2023-05-29 VITALS
DIASTOLIC BLOOD PRESSURE: 85 MMHG | WEIGHT: 314.6 LBS | RESPIRATION RATE: 11 BRPM | OXYGEN SATURATION: 100 % | HEART RATE: 61 BPM | HEIGHT: 73 IN | BODY MASS INDEX: 41.69 KG/M2 | SYSTOLIC BLOOD PRESSURE: 106 MMHG | TEMPERATURE: 97.1 F

## 2023-05-29 DIAGNOSIS — Z95.2 HISTORY OF AORTIC VALVE REPLACEMENT: ICD-10-CM

## 2023-05-29 DIAGNOSIS — K31.1 GASTRIC OUTLET OBSTRUCTION: Primary | ICD-10-CM

## 2023-05-29 DIAGNOSIS — A69.20 LYME DISEASE: ICD-10-CM

## 2023-05-29 PROBLEM — T18.108A FOREIGN BODY IN ESOPHAGUS, INITIAL ENCOUNTER: Status: ACTIVE | Noted: 2023-05-29

## 2023-05-29 PROBLEM — R57.9 SHOCK (H): Status: ACTIVE | Noted: 2023-05-29

## 2023-05-29 PROBLEM — J20.9 ACUTE BRONCHITIS, UNSPECIFIED ORGANISM: Status: ACTIVE | Noted: 2023-05-29

## 2023-05-29 PROBLEM — I50.42 CHRONIC COMBINED SYSTOLIC AND DIASTOLIC CONGESTIVE HEART FAILURE (H): Status: ACTIVE | Noted: 2023-05-29

## 2023-05-29 PROBLEM — R06.09 DYSPNEA ON EXERTION: Status: ACTIVE | Noted: 2023-05-29

## 2023-05-29 PROBLEM — R53.1 GENERALIZED WEAKNESS: Status: ACTIVE | Noted: 2023-05-29

## 2023-05-29 LAB
ALBUMIN SERPL BCG-MCNC: 3.2 G/DL (ref 3.5–5.2)
ALP SERPL-CCNC: 80 U/L (ref 40–129)
ALT SERPL W P-5'-P-CCNC: 12 U/L (ref 10–50)
ANION GAP SERPL CALCULATED.3IONS-SCNC: 12 MMOL/L (ref 7–15)
ANION GAP SERPL CALCULATED.3IONS-SCNC: 13 MMOL/L (ref 7–15)
AST SERPL W P-5'-P-CCNC: 20 U/L (ref 10–50)
BASOPHILS # BLD AUTO: 0 10E3/UL (ref 0–0.2)
BASOPHILS NFR BLD AUTO: 0 %
BILIRUB SERPL-MCNC: 0.7 MG/DL
BUN SERPL-MCNC: 43 MG/DL (ref 8–23)
BUN SERPL-MCNC: 44.6 MG/DL (ref 8–23)
CA-I BLD-MCNC: 4.6 MG/DL (ref 4.4–5.2)
CALCIUM SERPL-MCNC: 8.6 MG/DL (ref 8.8–10.2)
CALCIUM SERPL-MCNC: 9.1 MG/DL (ref 8.8–10.2)
CHLORIDE SERPL-SCNC: 101 MMOL/L (ref 98–107)
CHLORIDE SERPL-SCNC: 102 MMOL/L (ref 98–107)
CREAT SERPL-MCNC: 1.69 MG/DL (ref 0.67–1.17)
CREAT SERPL-MCNC: 1.86 MG/DL (ref 0.67–1.17)
DEPRECATED HCO3 PLAS-SCNC: 25 MMOL/L (ref 22–29)
DEPRECATED HCO3 PLAS-SCNC: 27 MMOL/L (ref 22–29)
EOSINOPHIL # BLD AUTO: 0 10E3/UL (ref 0–0.7)
EOSINOPHIL NFR BLD AUTO: 1 %
ERYTHROCYTE [DISTWIDTH] IN BLOOD BY AUTOMATED COUNT: 15.4 % (ref 10–15)
ERYTHROCYTE [DISTWIDTH] IN BLOOD BY AUTOMATED COUNT: 15.5 % (ref 10–15)
GFR SERPL CREATININE-BSD FRML MDRD: 35 ML/MIN/1.73M2
GFR SERPL CREATININE-BSD FRML MDRD: 39 ML/MIN/1.73M2
GLUCOSE BLDC GLUCOMTR-MCNC: 100 MG/DL (ref 70–99)
GLUCOSE BLDC GLUCOMTR-MCNC: 104 MG/DL (ref 70–99)
GLUCOSE SERPL-MCNC: 115 MG/DL (ref 70–99)
GLUCOSE SERPL-MCNC: 169 MG/DL (ref 70–99)
HCT VFR BLD AUTO: 31 % (ref 40–53)
HCT VFR BLD AUTO: 31.9 % (ref 40–53)
HGB BLD-MCNC: 10.1 G/DL (ref 13.3–17.7)
HGB BLD-MCNC: 9.9 G/DL (ref 13.3–17.7)
HOLD SPECIMEN: NORMAL
HOLD SPECIMEN: NORMAL
IMM GRANULOCYTES # BLD: 0 10E3/UL
IMM GRANULOCYTES NFR BLD: 0 %
INR PPP: 2.94 (ref 0.85–1.15)
LACTATE SERPL-SCNC: 1.7 MMOL/L (ref 0.7–2)
LACTATE SERPL-SCNC: 2.6 MMOL/L (ref 0.7–2)
LACTATE SERPL-SCNC: 2.7 MMOL/L (ref 0.7–2)
LYMPHOCYTES # BLD AUTO: 0.6 10E3/UL (ref 0.8–5.3)
LYMPHOCYTES NFR BLD AUTO: 7 %
MAGNESIUM SERPL-MCNC: 1.9 MG/DL (ref 1.7–2.3)
MCH RBC QN AUTO: 31.4 PG (ref 26.5–33)
MCH RBC QN AUTO: 31.6 PG (ref 26.5–33)
MCHC RBC AUTO-ENTMCNC: 31.7 G/DL (ref 31.5–36.5)
MCHC RBC AUTO-ENTMCNC: 31.9 G/DL (ref 31.5–36.5)
MCV RBC AUTO: 99 FL (ref 78–100)
MCV RBC AUTO: 99 FL (ref 78–100)
MONOCYTES # BLD AUTO: 0.4 10E3/UL (ref 0–1.3)
MONOCYTES NFR BLD AUTO: 5 %
NEUTROPHILS # BLD AUTO: 7.1 10E3/UL (ref 1.6–8.3)
NEUTROPHILS NFR BLD AUTO: 87 %
NRBC # BLD AUTO: 0 10E3/UL
NRBC BLD AUTO-RTO: 0 /100
PHOSPHATE SERPL-MCNC: 2.8 MG/DL (ref 2.5–4.5)
PLATELET # BLD AUTO: 109 10E3/UL (ref 150–450)
PLATELET # BLD AUTO: 112 10E3/UL (ref 150–450)
POTASSIUM SERPL-SCNC: 3.4 MMOL/L (ref 3.4–5.3)
POTASSIUM SERPL-SCNC: 3.4 MMOL/L (ref 3.4–5.3)
PROCALCITONIN SERPL IA-MCNC: 0.39 NG/ML
PROT SERPL-MCNC: 6.5 G/DL (ref 6.4–8.3)
RBC # BLD AUTO: 3.13 10E6/UL (ref 4.4–5.9)
RBC # BLD AUTO: 3.22 10E6/UL (ref 4.4–5.9)
SODIUM SERPL-SCNC: 138 MMOL/L (ref 136–145)
SODIUM SERPL-SCNC: 142 MMOL/L (ref 136–145)
WBC # BLD AUTO: 8.2 10E3/UL (ref 4–11)
WBC # BLD AUTO: 9.1 10E3/UL (ref 4–11)

## 2023-05-29 PROCEDURE — 84100 ASSAY OF PHOSPHORUS: CPT | Performed by: INTERNAL MEDICINE

## 2023-05-29 PROCEDURE — 250N000009 HC RX 250: Performed by: INTERNAL MEDICINE

## 2023-05-29 PROCEDURE — 258N000003 HC RX IP 258 OP 636: Performed by: INTERNAL MEDICINE

## 2023-05-29 PROCEDURE — 36415 COLL VENOUS BLD VENIPUNCTURE: CPT | Performed by: INTERNAL MEDICINE

## 2023-05-29 PROCEDURE — 83735 ASSAY OF MAGNESIUM: CPT | Performed by: INTERNAL MEDICINE

## 2023-05-29 PROCEDURE — 250N000011 HC RX IP 250 OP 636: Performed by: INTERNAL MEDICINE

## 2023-05-29 PROCEDURE — 43235 EGD DIAGNOSTIC BRUSH WASH: CPT | Performed by: INTERNAL MEDICINE

## 2023-05-29 PROCEDURE — 84145 PROCALCITONIN (PCT): CPT | Performed by: INTERNAL MEDICINE

## 2023-05-29 PROCEDURE — 83605 ASSAY OF LACTIC ACID: CPT | Performed by: INTERNAL MEDICINE

## 2023-05-29 PROCEDURE — 3E043XZ INTRODUCTION OF VASOPRESSOR INTO CENTRAL VEIN, PERCUTANEOUS APPROACH: ICD-10-PCS | Performed by: INTERNAL MEDICINE

## 2023-05-29 PROCEDURE — 71045 X-RAY EXAM CHEST 1 VIEW: CPT

## 2023-05-29 PROCEDURE — 250N000013 HC RX MED GY IP 250 OP 250 PS 637: Performed by: INTERNAL MEDICINE

## 2023-05-29 PROCEDURE — 85027 COMPLETE CBC AUTOMATED: CPT | Performed by: INTERNAL MEDICINE

## 2023-05-29 PROCEDURE — C9113 INJ PANTOPRAZOLE SODIUM, VIA: HCPCS | Performed by: FAMILY MEDICINE

## 2023-05-29 PROCEDURE — 0WJP8ZZ INSPECTION OF GASTROINTESTINAL TRACT, VIA NATURAL OR ARTIFICIAL OPENING ENDOSCOPIC APPROACH: ICD-10-PCS | Performed by: INTERNAL MEDICINE

## 2023-05-29 PROCEDURE — 250N000013 HC RX MED GY IP 250 OP 250 PS 637: Performed by: FAMILY MEDICINE

## 2023-05-29 PROCEDURE — 99291 CRITICAL CARE FIRST HOUR: CPT | Performed by: INTERNAL MEDICINE

## 2023-05-29 PROCEDURE — G0378 HOSPITAL OBSERVATION PER HR: HCPCS

## 2023-05-29 PROCEDURE — 82330 ASSAY OF CALCIUM: CPT | Performed by: INTERNAL MEDICINE

## 2023-05-29 PROCEDURE — 99239 HOSP IP/OBS DSCHRG MGMT >30: CPT | Performed by: INTERNAL MEDICINE

## 2023-05-29 PROCEDURE — 85610 PROTHROMBIN TIME: CPT | Performed by: INTERNAL MEDICINE

## 2023-05-29 PROCEDURE — 999N000099 HC STATISTIC MODERATE SEDATION < 10 MIN: Performed by: INTERNAL MEDICINE

## 2023-05-29 PROCEDURE — 250N000011 HC RX IP 250 OP 636: Performed by: FAMILY MEDICINE

## 2023-05-29 PROCEDURE — 200N000001 HC R&B ICU

## 2023-05-29 PROCEDURE — 80053 COMPREHEN METABOLIC PANEL: CPT | Performed by: INTERNAL MEDICINE

## 2023-05-29 PROCEDURE — 96375 TX/PRO/DX INJ NEW DRUG ADDON: CPT

## 2023-05-29 PROCEDURE — 85025 COMPLETE CBC W/AUTO DIFF WBC: CPT | Performed by: INTERNAL MEDICINE

## 2023-05-29 RX ORDER — LIDOCAINE 40 MG/G
CREAM TOPICAL
Status: DISCONTINUED | OUTPATIENT
Start: 2023-05-29 | End: 2023-06-01

## 2023-05-29 RX ORDER — FLUCONAZOLE 40 MG/ML
100 POWDER, FOR SUSPENSION ORAL DAILY
Status: COMPLETED | OUTPATIENT
Start: 2023-05-29 | End: 2023-06-04

## 2023-05-29 RX ORDER — EPINEPHRINE 1 MG/ML
0.1 INJECTION, SOLUTION, CONCENTRATE INTRAVENOUS
Status: DISCONTINUED | OUTPATIENT
Start: 2023-05-29 | End: 2023-05-29

## 2023-05-29 RX ORDER — ONDANSETRON 2 MG/ML
4 INJECTION INTRAMUSCULAR; INTRAVENOUS EVERY 6 HOURS PRN
Status: DISCONTINUED | OUTPATIENT
Start: 2023-05-29 | End: 2023-06-07 | Stop reason: HOSPADM

## 2023-05-29 RX ORDER — ROPIVACAINE IN 0.9% SOD CHL/PF 0.1 %
.03-.125 PLASTIC BAG, INJECTION (ML) EPIDURAL CONTINUOUS
Status: DISCONTINUED | OUTPATIENT
Start: 2023-05-29 | End: 2023-05-29 | Stop reason: HOSPADM

## 2023-05-29 RX ORDER — NALOXONE HYDROCHLORIDE 0.4 MG/ML
0.4 INJECTION, SOLUTION INTRAMUSCULAR; INTRAVENOUS; SUBCUTANEOUS
Status: DISCONTINUED | OUTPATIENT
Start: 2023-05-29 | End: 2023-06-01

## 2023-05-29 RX ORDER — FENTANYL CITRATE 50 UG/ML
50-100 INJECTION, SOLUTION INTRAMUSCULAR; INTRAVENOUS EVERY 5 MIN PRN
Status: DISCONTINUED | OUTPATIENT
Start: 2023-05-29 | End: 2023-05-29

## 2023-05-29 RX ORDER — ONDANSETRON 4 MG/1
4 TABLET, ORALLY DISINTEGRATING ORAL EVERY 6 HOURS PRN
Status: DISCONTINUED | OUTPATIENT
Start: 2023-05-29 | End: 2023-06-07 | Stop reason: HOSPADM

## 2023-05-29 RX ORDER — ATROPINE SULFATE 0.1 MG/ML
1 INJECTION INTRAVENOUS
Status: DISCONTINUED | OUTPATIENT
Start: 2023-05-29 | End: 2023-05-29

## 2023-05-29 RX ORDER — PROCHLORPERAZINE MALEATE 5 MG
5 TABLET ORAL EVERY 6 HOURS PRN
Status: DISCONTINUED | OUTPATIENT
Start: 2023-05-29 | End: 2023-06-07 | Stop reason: HOSPADM

## 2023-05-29 RX ORDER — FLUMAZENIL 0.1 MG/ML
0.2 INJECTION, SOLUTION INTRAVENOUS
Status: DISCONTINUED | OUTPATIENT
Start: 2023-05-29 | End: 2023-05-29

## 2023-05-29 RX ORDER — NALOXONE HYDROCHLORIDE 0.4 MG/ML
0.2 INJECTION, SOLUTION INTRAMUSCULAR; INTRAVENOUS; SUBCUTANEOUS
Status: DISCONTINUED | OUTPATIENT
Start: 2023-05-29 | End: 2023-06-01

## 2023-05-29 RX ORDER — SODIUM CHLORIDE, SODIUM LACTATE, POTASSIUM CHLORIDE, CALCIUM CHLORIDE 600; 310; 30; 20 MG/100ML; MG/100ML; MG/100ML; MG/100ML
INJECTION, SOLUTION INTRAVENOUS CONTINUOUS
Status: DISCONTINUED | OUTPATIENT
Start: 2023-05-29 | End: 2023-06-05

## 2023-05-29 RX ORDER — NICOTINE POLACRILEX 4 MG
15-30 LOZENGE BUCCAL
Status: DISCONTINUED | OUTPATIENT
Start: 2023-05-29 | End: 2023-06-07 | Stop reason: HOSPADM

## 2023-05-29 RX ORDER — NOREPINEPHRINE BITARTRATE 0.02 MG/ML
.01-.6 INJECTION, SOLUTION INTRAVENOUS CONTINUOUS
Status: DISCONTINUED | OUTPATIENT
Start: 2023-05-29 | End: 2023-06-01

## 2023-05-29 RX ORDER — WARFARIN SODIUM 2 MG/1
2 TABLET ORAL
Status: DISCONTINUED | OUTPATIENT
Start: 2023-05-29 | End: 2023-05-29 | Stop reason: HOSPADM

## 2023-05-29 RX ORDER — ENOXAPARIN SODIUM 100 MG/ML
40 INJECTION SUBCUTANEOUS EVERY 24 HOURS
Status: DISCONTINUED | OUTPATIENT
Start: 2023-05-29 | End: 2023-05-29 | Stop reason: HOSPADM

## 2023-05-29 RX ORDER — PROCHLORPERAZINE 25 MG
12.5 SUPPOSITORY, RECTAL RECTAL EVERY 12 HOURS PRN
Status: DISCONTINUED | OUTPATIENT
Start: 2023-05-29 | End: 2023-06-07 | Stop reason: HOSPADM

## 2023-05-29 RX ORDER — SIMETHICONE 40MG/0.6ML
133 SUSPENSION, DROPS(FINAL DOSAGE FORM)(ML) ORAL
Status: DISCONTINUED | OUTPATIENT
Start: 2023-05-29 | End: 2023-05-29

## 2023-05-29 RX ORDER — DIPHENHYDRAMINE HYDROCHLORIDE 50 MG/ML
25-50 INJECTION INTRAMUSCULAR; INTRAVENOUS
Status: DISCONTINUED | OUTPATIENT
Start: 2023-05-29 | End: 2023-05-29

## 2023-05-29 RX ORDER — PIPERACILLIN SODIUM, TAZOBACTAM SODIUM 3; .375 G/15ML; G/15ML
3.38 INJECTION, POWDER, LYOPHILIZED, FOR SOLUTION INTRAVENOUS EVERY 6 HOURS
Status: DISCONTINUED | OUTPATIENT
Start: 2023-05-29 | End: 2023-06-01

## 2023-05-29 RX ORDER — BISACODYL 10 MG
10 SUPPOSITORY, RECTAL RECTAL DAILY PRN
Status: DISCONTINUED | OUTPATIENT
Start: 2023-05-29 | End: 2023-06-07 | Stop reason: HOSPADM

## 2023-05-29 RX ORDER — SUCRALFATE ORAL 1 G/10ML
1 SUSPENSION ORAL
Status: DISCONTINUED | OUTPATIENT
Start: 2023-05-29 | End: 2023-06-07 | Stop reason: HOSPADM

## 2023-05-29 RX ORDER — POLYETHYLENE GLYCOL 3350 17 G/17G
17 POWDER, FOR SOLUTION ORAL DAILY PRN
Status: DISCONTINUED | OUTPATIENT
Start: 2023-05-29 | End: 2023-06-07 | Stop reason: HOSPADM

## 2023-05-29 RX ORDER — FLUMAZENIL 0.1 MG/ML
0.2 INJECTION, SOLUTION INTRAVENOUS
Status: ACTIVE | OUTPATIENT
Start: 2023-05-29 | End: 2023-05-30

## 2023-05-29 RX ORDER — AMOXICILLIN 250 MG
2 CAPSULE ORAL 2 TIMES DAILY PRN
Status: DISCONTINUED | OUTPATIENT
Start: 2023-05-29 | End: 2023-06-07 | Stop reason: HOSPADM

## 2023-05-29 RX ORDER — DEXTROSE MONOHYDRATE 25 G/50ML
25-50 INJECTION, SOLUTION INTRAVENOUS
Status: DISCONTINUED | OUTPATIENT
Start: 2023-05-29 | End: 2023-06-07 | Stop reason: HOSPADM

## 2023-05-29 RX ORDER — AMOXICILLIN 250 MG
1 CAPSULE ORAL 2 TIMES DAILY PRN
Status: DISCONTINUED | OUTPATIENT
Start: 2023-05-29 | End: 2023-06-07 | Stop reason: HOSPADM

## 2023-05-29 RX ORDER — HEPARIN SODIUM 5000 [USP'U]/.5ML
5000 INJECTION, SOLUTION INTRAVENOUS; SUBCUTANEOUS EVERY 8 HOURS
Status: DISCONTINUED | OUTPATIENT
Start: 2023-05-29 | End: 2023-05-29

## 2023-05-29 RX ORDER — HYDROMORPHONE HCL IN WATER/PF 6 MG/30 ML
0.2 PATIENT CONTROLLED ANALGESIA SYRINGE INTRAVENOUS
Status: DISCONTINUED | OUTPATIENT
Start: 2023-05-29 | End: 2023-06-01

## 2023-05-29 RX ADMIN — ALLOPURINOL 100 MG: 100 TABLET ORAL at 08:20

## 2023-05-29 RX ADMIN — MIDAZOLAM 2 MG: 1 INJECTION INTRAMUSCULAR; INTRAVENOUS at 21:00

## 2023-05-29 RX ADMIN — SUCRALFATE ORAL 1 G: 1 SUSPENSION ORAL at 22:50

## 2023-05-29 RX ADMIN — ACETAMINOPHEN 500 MG: 500 TABLET ORAL at 08:20

## 2023-05-29 RX ADMIN — BENZOCAINE 6 MG-MENTHOL 10 MG LOZENGES 1 LOZENGE: at 10:04

## 2023-05-29 RX ADMIN — LEVOTHYROXINE SODIUM 100 MCG: 0.1 TABLET ORAL at 06:33

## 2023-05-29 RX ADMIN — SODIUM CHLORIDE 500 ML: 9 INJECTION, SOLUTION INTRAVENOUS at 09:40

## 2023-05-29 RX ADMIN — FENTANYL CITRATE 50 MCG: 50 INJECTION, SOLUTION INTRAMUSCULAR; INTRAVENOUS at 21:01

## 2023-05-29 RX ADMIN — CALCITRIOL 0.25 MCG: 0.25 CAPSULE, LIQUID FILLED ORAL at 08:23

## 2023-05-29 RX ADMIN — ATORVASTATIN CALCIUM 10 MG: 10 TABLET, FILM COATED ORAL at 08:20

## 2023-05-29 RX ADMIN — SODIUM CHLORIDE 500 ML: 9 INJECTION, SOLUTION INTRAVENOUS at 15:00

## 2023-05-29 RX ADMIN — SODIUM CHLORIDE 500 ML: 9 INJECTION, SOLUTION INTRAVENOUS at 10:04

## 2023-05-29 RX ADMIN — SODIUM CHLORIDE, POTASSIUM CHLORIDE, SODIUM LACTATE AND CALCIUM CHLORIDE: 600; 310; 30; 20 INJECTION, SOLUTION INTRAVENOUS at 21:18

## 2023-05-29 RX ADMIN — MICONAZOLE NITRATE: 20 POWDER TOPICAL at 14:01

## 2023-05-29 RX ADMIN — FLUCONAZOLE 100 MG: 40 POWDER, FOR SUSPENSION ORAL at 22:49

## 2023-05-29 RX ADMIN — FERROUS SULFATE TAB 325 MG (65 MG ELEMENTAL FE) 325 MG: 325 (65 FE) TAB at 08:20

## 2023-05-29 RX ADMIN — POTASSIUM CHLORIDE 20 MEQ: 20 SOLUTION ORAL at 08:24

## 2023-05-29 RX ADMIN — Medication 0.03 MCG/KG/MIN: at 20:22

## 2023-05-29 RX ADMIN — PANTOPRAZOLE SODIUM 40 MG: 40 INJECTION, POWDER, FOR SOLUTION INTRAVENOUS at 08:20

## 2023-05-29 RX ADMIN — SODIUM CHLORIDE 500 ML: 9 INJECTION, SOLUTION INTRAVENOUS at 12:04

## 2023-05-29 RX ADMIN — SUCRALFATE 1 G: 1 TABLET ORAL at 08:20

## 2023-05-29 RX ADMIN — Medication 0.03 MCG/KG/MIN: at 15:03

## 2023-05-29 RX ADMIN — PIPERACILLIN AND TAZOBACTAM 3.38 G: 3; .375 INJECTION, POWDER, FOR SOLUTION INTRAVENOUS at 20:36

## 2023-05-29 RX ADMIN — GABAPENTIN 100 MG: 100 CAPSULE ORAL at 08:20

## 2023-05-29 ASSESSMENT — ACTIVITIES OF DAILY LIVING (ADL)
TOILETING_ASSISTANCE: TOILETING DIFFICULTY, REQUIRES EQUIPMENT;TOILETING DIFFICULTY, ASSISTANCE 1 PERSON
ADLS_ACUITY_SCORE: 49
DIFFICULTY_EATING/SWALLOWING: YES
WALKING_OR_CLIMBING_STAIRS_DIFFICULTY: YES
DOING_ERRANDS_INDEPENDENTLY_DIFFICULTY: YES
WALKING_OR_CLIMBING_STAIRS: STAIR CLIMBING DIFFICULTY, REQUIRES EQUIPMENT;AMBULATION DIFFICULTY, REQUIRES EQUIPMENT
DRESSING/BATHING_DIFFICULTY: YES
WEAR_GLASSES_OR_BLIND: YES
CHANGE_IN_FUNCTIONAL_STATUS_SINCE_ONSET_OF_CURRENT_ILLNESS/INJURY: NO
VISION_MANAGEMENT: GLASSES
ADLS_ACUITY_SCORE: 41
DIFFICULTY_COMMUNICATING: NO
EATING/SWALLOWING: SWALLOWING SOLID FOOD;SWALLOWING LIQUIDS
ADLS_ACUITY_SCORE: 41
ADLS_ACUITY_SCORE: 41
DRESSING/BATHING: BATHING DIFFICULTY, REQUIRES EQUIPMENT;BATHING DIFFICULTY, ASSISTANCE 1 PERSON
TOILETING_ISSUES: YES
HEARING_DIFFICULTY_OR_DEAF: NO
VISION_MANAGEMENT: GLASSES
DRESSING/BATHING_DIFFICULTY: YES
ADLS_ACUITY_SCORE: 50
CONCENTRATING,_REMEMBERING_OR_MAKING_DECISIONS_DIFFICULTY: NO
TOILETING_MANAGEMENT: SON HELPS
FALL_HISTORY_WITHIN_LAST_SIX_MONTHS: NO
ADLS_ACUITY_SCORE: 40
EQUIPMENT_CURRENTLY_USED_AT_HOME: GRAB BAR, TOILET;GRAB BAR, TUB/SHOWER;WALKER, STANDARD
DOING_ERRANDS_INDEPENDENTLY_DIFFICULTY: YES
WEAR_GLASSES_OR_BLIND: YES
CHANGE_IN_FUNCTIONAL_STATUS_SINCE_ONSET_OF_CURRENT_ILLNESS/INJURY: NO
CONCENTRATING,_REMEMBERING_OR_MAKING_DECISIONS_DIFFICULTY: NO
EQUIPMENT_CURRENTLY_USED_AT_HOME: GRAB BAR, TOILET;GRAB BAR, TUB/SHOWER;WALKER, STANDARD
ADLS_ACUITY_SCORE: 41
TOILETING_ISSUES: YES
DRESSING/BATHING: BATHING DIFFICULTY, REQUIRES EQUIPMENT;BATHING DIFFICULTY, ASSISTANCE 1 PERSON
ADLS_ACUITY_SCORE: 41
ADLS_ACUITY_SCORE: 44
WALKING_OR_CLIMBING_STAIRS: STAIR CLIMBING DIFFICULTY, REQUIRES EQUIPMENT;AMBULATION DIFFICULTY, REQUIRES EQUIPMENT
TOILETING_ASSISTANCE: TOILETING DIFFICULTY, REQUIRES EQUIPMENT;TOILETING DIFFICULTY, ASSISTANCE 1 PERSON
WALKING_OR_CLIMBING_STAIRS_DIFFICULTY: YES
ADLS_ACUITY_SCORE: 50
FALL_HISTORY_WITHIN_LAST_SIX_MONTHS: NO
DIFFICULTY_EATING/SWALLOWING: OTHER (SEE COMMENTS)
TOILETING_MANAGEMENT: SON HELPS
ADLS_ACUITY_SCORE: 41

## 2023-05-29 NOTE — PROGRESS NOTES
[place] [facility] TRANSPORT NOTE  Data:   Reason for Transport:  Higher level of care for egd w/ lavage    Chacorta Mendoza was transported to [facility] via cart at 1850.  Patient was accompanied by Emergency Medical Services. Equipment used for transport: Oxygen  oxymask 2L, Cardiac monitor , Pulse oximeter, Blood pressure monitor and IV pump. Family was aware of reason for transport: yes    Action:  Report: given to  ICU RN    Response:  Patient's condition when transferred off unit was stable on norepinephrine .01/mg/kg/min    Michaela Pearl RN

## 2023-05-29 NOTE — PLAN OF CARE
Blood pressure has been low all day, 80's/40's this morning, no dizziness reported in bed.  500cc fluid bolus x 3 given with no improvement in blood pressure.  Patient also triggered sepsis bpa, lactic drawn was elevated 2.7.      BP continues to be 70-80's/30-40's following fluid boluses.  MD updated and wants patient transferred to ICU.  Another 500cc bolus ordered and infusing.      Patient also having difficulty swallowing food at lunch - turkey and mashed potatoes.  MD updated regarding this as well.      Patient needed to have a bowel movement prior to transfer and was assisted to the commode.  Had a medium stool, no blood noted, dark but takes an iron supplement.  Patient did ok with activity but reporting feeling a little dizzy when laying back down again, blood pressure 80's/40's.  Transferring to ICU.

## 2023-05-29 NOTE — PROGRESS NOTES
Pt transferred to ICU around 1530 for norepinephrine gtt r/t hypotension d/t possible food stuck in esophagus/vagal response.  Pt will be transferred to [facility] for procedure - report called to ICU [phone].  Awaiting EMS transfer.  Skin flowsheet & wound LDA updated & completed.  Informed  RN that pt will need WOC consult due to RLE wounds.  Pt does not have any c/o once transferred - maintaining airway, no spitting up or other sx's from possible food in esophagus.    Pt maintaining MAPs >=65 w/ norepinephrine.    Pt has been strict npo since transferred to ICU.

## 2023-05-29 NOTE — PROGRESS NOTES
[place] [facility] TRANSPORT NOTE  Data:   Reason for Transport:  Needs ICU    Chacorta Mendoza was transported to ICU via cart at 1430.  Patient was accompanied by Registered Nurse. Equipment used for transport: IV pump. Family was aware of reason for transport: yes daughter Eloy called and updated.      Action:  Report: will give to Michaela BEARDEN.    Response:  Patient's condition when transferred off unit was - continues to have low blood pressure.    Estella Tapia RN

## 2023-05-29 NOTE — DISCHARGE SUMMARY
[facility] Discharge Summary    Chacorta Mendoza MRN# [id number]   Age: 87 year old YOB: 1935     Date of Admission:  5/28/2023  Date of Discharge::  5/29/2023  Admitting Physician:  Juan C Valencia MD  Discharge Physician:  Juan C Valencia MD  Primary Physician: [facility] Facility: N/A     Home clinic: unknown          Admission Diagnoses:   Paroxysmal atrial fibrillation (H) [I48.0]  Dyspnea on exertion [R06.09]  Generalized weakness [R53.1]  Chronic kidney disease (CKD), stage IV (severe) (H) [N18.4]  Failure to thrive in adult [R62.7]  History of aortic valve replacement [Z95.2]  Chronic combined systolic and diastolic congestive heart failure (H) [I50.42]  Current use of long term anticoagulation [Z79.01]  Morbid exogenous obesity (H) [E66.01]  Acute bronchitis, unspecified organism [J20.9]  Foreign body in esophagus, initial encounter [T18.108A]          Discharge Diagnosis:     Principle diagnosis: Foreign body in esophagus  Secondary diagnoses:  Patient Active Problem List   Diagnosis     Chronic diastolic heart failure (H)     Chronic kidney disease (CKD), stage IV (severe) (H)     Current use of long term anticoagulation     Essential hypertension     Gastric outlet obstruction     Gout     Heart block     History of aortic valve replacement     LBBB (left bundle branch block)     Hypothyroidism (acquired)     Hypercholesterolemia     Macrocytic anemia     Morbid exogenous obesity (H)     JAZMIN (obstructive sleep apnea)     Pacemaker     Secondary hyperparathyroidism (H)     Restless leg syndrome     Paroxysmal atrial fibrillation (H)     Spondylosis of lumbar region without myelopathy or radiculopathy     Type 2 diabetes mellitus (H)     Failure to thrive in adult     Dyspnea on exertion     Generalized weakness     Chronic combined systolic and diastolic congestive heart failure (H)     Foreign body in esophagus, initial encounter     Acute bronchitis, unspecified organism           Brief History of Presenting Illness:   As per admit hx  This patient is a 87 year old  male with a significant past medical history of hypertension afib on coumadin and morbid obesity who presents with spitting foamy stuff all night. He ate meatloaf and potatoes last evening and went to bed. All night he was spitting up foamy stuff. No CP/SOB-cough while spitting up. No nausea but did thow up even water after he drank. No fever/ chills. No recent illness.          Hospital Course:      Foreign body in esophagus  All sx started after eating meatloaf and potatoes 5/27 evening. Was Unable to even dring water.  S/p EGD 5/28-Food was found in the entire esophagus-Removal was successful.   Pt able to eat/ drink normal until lunch. Had turkey/ mashed potatoes and later started having hypotension and was unable to drink grape juice. He said his sx are similar to last episode. Pt like has food lodged in esophagus again and needs EGD with lavage. Discussed with surgeon here -apparently needs to be transferred for scope with equipment and lavage they cannot do here.   Pt will be transferred to tertiary care for urgent/ semi urgent  EGD and GI consult. Will hold off on po meds and keep pt NPO from now.     Hypotension / lactic acidosis  Initially felt it Likely due to severe dehydration. Pt had not eaten or drank x 1 day and was continuing to take his diuretics and other meds. Says he was also urinating a lot. Has nl wbc/ PCT 0.3. No fever/ no cough or SOB. CXR today  shows improved atelectasis compared to one on admission.   BP continued to drop and was  Unresponsive to 2L fluid bolus.  LA was 2.7.  No sn/sx of infection. Nl wbc/ NO FEVER/ cxr NL. Procalcitinin nl.    Likely vagal response to food lodged in esophagus. Would avoid fluid overload due to EF 35-40% in ECHO 10/22.  -will transfer pt to ICU. Start nor-epi to keep SBP >100. Hold off on further fluids now.      Severe GERD  EGD 3/23 showed mod-sev  esophagitis. On protonix bid and carafate at home.   -continue iv protonix.    Chronic afib  Not on any rate control meds. On coumadin for anticoagulation.   -hold coumadin-start lovenox    H/o diastolic CHF  Last ECHO 10/22 showed EF 35-40%. On bumex 2mg bid  -hold meds    Hypo t4  Hold po levothyroxine    hld  Hold po statin    Diet controlled DM2  ISS when eating             Procedures:   EGD-see above         Allergies:      Allergies   Allergen Reactions     Levofloxacin Muscle Pain (Myalgia) and Other (See Comments)     Other reaction(s): Myalgias  Muscle Pain  Muscle Pain  Muscle Pain       Lisinopril Other (See Comments)     Changed to ARB due to rising creatinine  Changed to ARB due to rising creatinine  Changed to ARB due to rising creatinine               Medications Prior to Admission:     Medications Prior to Admission   Medication Sig Dispense Refill Last Dose     acetaminophen (TYLENOL) 500 MG tablet Take 500 mg by mouth daily   5/27/2023 at am     allopurinol (ZYLOPRIM) 100 MG tablet Take 100 mg by mouth daily   5/27/2023 at hs     atorvastatin (LIPITOR) 10 MG tablet Take 10 mg by mouth daily   5/27/2023 at am     bumetanide (BUMEX) 2 MG tablet Take 2 mg by mouth 2 times daily 2 tabs at 0800 and 1 tab at 1300   5/27/2023 at 1300     calcitRIOL (ROCALTROL) 0.25 MCG capsule Take 0.25 mcg by mouth daily   5/27/2023 at am     ferrous sulfate (FEROSUL) 325 (65 Fe) MG tablet Take 325 mg by mouth daily (with breakfast)   5/27/2023 at am     gabapentin (NEURONTIN) 100 MG capsule Take 100 mg by mouth daily   5/27/2023 at am     levothyroxine (SYNTHROID/LEVOTHROID) 100 MCG tablet Take 100 mcg by mouth daily   5/27/2023 at am     Multiple Vitamin (MULTIVITAMIN ADULT) TABS Take 1 tablet by mouth daily   5/27/2023 at am     OTHER MEDICAL SUPPLIES by Other route See Admin Instructions Diabetic shoe        pantoprazole (PROTONIX) 40 MG EC tablet Take 40 mg by mouth 2 times daily (before meals)   Past Month at am      potassium chloride ER (KLOR-CON M) 10 MEQ CR tablet Take 2 tablets by mouth daily with food   5/27/2023 at 1700     rOPINIRole (REQUIP) 1 MG tablet Take 1 mg by mouth At Bedtime   5/27/2023 at hs     sacubitril-valsartan (ENTRESTO) 24-26 MG per tablet Take 0.5 tablets by mouth 2 times daily   5/27/2023 at hs     spironolactone (ALDACTONE) 25 MG tablet Take 0.5 mg by mouth daily   Unknown at unknown     sucralfate (CARAFATE) 1 GM tablet Take 1 g by mouth 2 times daily (with meals)   5/27/2023 at am     urea (CARMOL) 10 % external cream Apply 1 Application topically as needed   Unknown at unknown     warfarin ANTICOAGULANT (COUMADIN) 4 MG tablet Take 4 mg by mouth daily 4 mg Monday, Wednesday, Friday and 6 mg the other 4 days   5/27/2023 at am             Discharge Medications:     Current Facility-Administered Medications   Medication     [Held by provider] acetaminophen (TYLENOL) tablet 500 mg     [Held by provider] allopurinol (ZYLOPRIM) tablet 100 mg     [Held by provider] atorvastatin (LIPITOR) tablet 10 mg     benzocaine-menthol (CHLORASEPTIC) 6-10 MG lozenge 1 lozenge     [Held by provider] bumetanide (BUMEX) tablet 2 mg     [Held by provider] calcitRIOL (ROCALTROL) capsule 0.25 mcg     enoxaparin ANTICOAGULANT (LOVENOX) injection 40 mg     [Held by provider] ferrous sulfate (FEROSUL) tablet 325 mg     [Held by provider] gabapentin (NEURONTIN) capsule 100 mg     [Held by provider] levothyroxine (SYNTHROID/LEVOTHROID) tablet 100 mcg     lidocaine (LMX4) kit     lidocaine 1 % 0.1-1 mL     miconazole (MICATIN) 2 % powder     naloxone (NARCAN) injection 0.2 mg     naloxone (NARCAN) injection 0.2 mg     naloxone (NARCAN) injection 0.4 mg     naloxone (NARCAN) injection 0.4 mg     norepinephrine (LEVOPHED) 4 mg in  mL PERIPHERAL infusion     pantoprazole (PROTONIX) IV push injection 40 mg     phenylephrine (ASHLEY-SYNEPHRINE) injection 100 mcg     [Held by provider] potassium chloride (KAYCIEL) solution 20 mEq  "    [Held by provider] rOPINIRole (REQUIP) tablet 1 mg     [Held by provider] sacubitril-valsartan half-tab 12-13 mg     sodium chloride (PF) 0.9% PF flush 3 mL     sodium chloride (PF) 0.9% PF flush 3 mL     [Held by provider] spironolactone (ALDACTONE) half-tab 12.5 mg     [Held by provider] sucralfate (CARAFATE) tablet 1 g     [Held by provider] warfarin ANTICOAGULANT (COUMADIN) tablet 2 mg     Warfarin Dose Required Daily - Pharmacist Managed             Consultations:   Consultation during this admission received from surgery            Discharge Exam:   Blood pressure (!) 89/42, pulse 68, temperature 97.4  F (36.3  C), temperature source Oral, resp. rate 17, height 1.854 m (6' 1\"), weight 136.1 kg (300 lb), SpO2 (!) 89 %.  GENERAL APPEARANCE: obese, alert and no distress  EYES: conjunctiva clear, eyes grossly normal  HENT: external ears and nose normal   NECK: supple, no masses or adenopathy  RESP: lungs clear to auscultation - no rales, rhonchi or wheezes  CV: regular rate and rhythm, normal S1 S2, no S3 or S4 and no murmur, click or rub   ABDOMEN: soft, nontender, no HSM or masses and bowel sounds normal  MS: no clubbing, cyanosis; no edema  SKIN: clear without significant rashes or lesions  NEURO: Normal strength and tone, sensory exam grossly normal, mentation intact and speech normal    Unresulted Labs Ordered in the Past 30 Days of this Admission     No orders found for last 31 day(s).          Recent Results (from the past 24 hour(s))   XR Chest Port 1 View    Narrative    EXAM: XR CHEST PORT 1 VIEW  LOCATION: [facility]  DATE/TIME: 5/29/2023 9:06 AM CDT    INDICATION: Chronic heart failure.  COMPARISON: Chest radiograph 05/28/2023.      Impression    IMPRESSION:    Cardiomegaly and mild interstitial edema have not significant changed since yesterday's exam. Patchy airspace opacities at the lung bases have improved and may reflect improving atelectasis. Left lower lung " calcified granuloma is unchanged. No pleural   effusions. No pneumothorax. Atherosclerotic aortic arch calcifications.            Pending Tests at Discharge:   None                          Discharge Disposition:     Transferred to [facility]      Attestation:  I have reviewed today's vital signs, notes, medications, labs and imaging.    Time Spent on this Encounter   I, Juan C Valencia MD, personally saw the patient today and spent greater than 30 minutes discharging this patient.    Juan C Valencia MD

## 2023-05-29 NOTE — PROGRESS NOTES
Pt has hx of herbie - does not wear his cpap at home per daughters.  Pt's sats dip to 83-89% when sleeping - 2L oxymask applied w/ sats at 92%.

## 2023-05-29 NOTE — ANESTHESIA POSTPROCEDURE EVALUATION
Patient: Chacorta Mendoza    Procedure: Procedure(s):  ESOPHAGOGASTRODUODENOSCOPY, WITH FOREIGN BODY REMOVAL       Anesthesia Type:  General    Note:  Disposition: Outpatient   Postop Pain Control: Uneventful            Sign Out: Well controlled pain   PONV: No   Neuro/Psych: Uneventful            Sign Out: Acceptable/Baseline neuro status   Airway/Respiratory: Uneventful            Sign Out: Acceptable/Baseline resp. status   CV/Hemodynamics: Uneventful            Sign Out: Acceptable CV status; No obvious hypovolemia; No obvious fluid overload   Other NRE: NONE   DID A NON-ROUTINE EVENT OCCUR? No           Last vitals:  Vitals Value Taken Time   BP 84/47 05/28/23 1735   Temp 36.5  C (97.7  F) 05/28/23 1630   Pulse 103 05/28/23 1735   Resp 16 05/28/23 1730   SpO2 95 % 05/28/23 1730   Vitals shown include unvalidated device data.    Electronically Signed By: EMILIO Simeon CRNA  May 29, 2023  6:39 PM

## 2023-05-29 NOTE — PROGRESS NOTES
"Pt alert, oriented, assist 2 with walker. On 4 L O2 via nc. Tolerating clear liquids. Denies pain, nausea, SOB.     BP 97/42 (BP Location: Left arm)   Pulse 60   Temp 97.4  F (36.3  C) (Oral)   Resp 16   Ht 1.854 m (6' 1\")   Wt 136.1 kg (300 lb)   SpO2 100%   BMI 39.58 kg/m      Possible discharge home today.   "

## 2023-05-29 NOTE — PROGRESS NOTES
"[facility] Medicine Progress Note  Date of Service (when I saw the patient): 05/29/2023    REASON FOR ADMISSION / INTERVAL HISTORY:  Chacorta Mendoza is a 87 year old male who presents on 5/28/2023 with spitting up foamy stuff all night.   Feels better and is eating now.       Assessment & Plan      Foreign body in esophagus  All sx started after eating meatloaf and potatoes 5/27 evening. Was Unable to even dring water.  S/p EGD 5/28-Food was found in the entire esophagus-Removal was successful.   Pt able to eat/ drink normal now    Hypotension / lactic acidosis  Likely due to severe dehydration. Pt had not eaten or drank x 1 day and was continuing to take his diuretics and other meds. Says he was also urinating a lot. Has nl wbc/ PCT 0.3. No fever/ no cough or SOB. CXR shows improved atelectasis  -will continue to hold diuretics. Give IV fluid bolus 1.5L and follow up     Other chronic medical conditions -afib/ diastolic CHF/ CKD/hypo t4/ HLD/ dIet controlled DM2  All stable. Will continue meds as appropriate.    Diet: Regular Diet Adult    DVT Prophylaxis: Low Risk/Ambulatory with no VTE prophylaxis indicated  Kimball Catheter: Not present  Code Status:   unknown  Lines: AILYN BANSAL MD   Pg [phone]        ROS:  As described in A/P and Exam.  Otherwise ALL are  negative.    PHYSICAL EXAM:  All vitals have been reviewed    Blood pressure (!) 83/34, pulse 60, temperature 98.2  F (36.8  C), temperature source Oral, resp. rate 16, height 1.854 m (6' 1\"), weight 136.1 kg (300 lb), SpO2 92 %.    No intake/output data recorded.    GENERAL APPEARANCE: healthy, alert and no distress  EYES: conjunctiva clear, eyes grossly normal  HENT: external ears and nose normal   RESP: lungs clear to auscultation - no rales, rhonchi or wheezes  CV: regular rate and rhythm, normal S1 S2, no S3 or S4 and no murmur, click or rub   ABDOMEN: soft, nontender, no HSM or masses and bowel sounds " normal  MS: no clubbing, cyanosis; no edema  SKIN: clear without significant rashes or lesions  NEURO: -non-focal moves all 4 extr    ROUTINE  LABS (Last four results)  CMP  Recent Labs   Lab 05/29/23  0520 05/28/23  1800 05/28/23  0709     --  143   POTASSIUM 3.4  --  3.5   CHLORIDE 102  --  101   CO2 27  --  27   ANIONGAP 13  --  15   * 123* 143*   BUN 43.0*  --  48.7*   CR 1.69*  --  1.92*   GFRESTIMATED 39*  --  34*   MATTHEW 9.1  --  9.7   PROTTOTAL  --   --  7.7   ALBUMIN  --   --  4.0   BILITOTAL  --   --  0.8   ALKPHOS  --   --  85   AST  --   --  19   ALT  --   --  11     CBC  Recent Labs   Lab 05/29/23  0520 05/28/23  0709   WBC 8.2 6.6   RBC 3.13* 3.40*   HGB 9.9* 10.6*   HCT 31.0* 32.9*   MCV 99 97   MCH 31.6 31.2   MCHC 31.9 32.2   RDW 15.5* 15.2*   * 126*     INR  Recent Labs   Lab 05/29/23  0520 05/28/23  0709   INR 2.94* 2.30*     Arterial Blood GasNo lab results found in last 7 days.    Recent Results (from the past 24 hour(s))   XR Chest Port 1 View    Narrative    EXAM: XR CHEST PORT 1 VIEW  LOCATION: [facility]  DATE/TIME: 5/29/2023 9:06 AM CDT    INDICATION: Chronic heart failure.  COMPARISON: Chest radiograph 05/28/2023.      Impression    IMPRESSION:    Cardiomegaly and mild interstitial edema have not significant changed since yesterday's exam. Patchy airspace opacities at the lung bases have improved and may reflect improving atelectasis. Left lower lung calcified granuloma is unchanged. No pleural   effusions. No pneumothorax. Atherosclerotic aortic arch calcifications.

## 2023-05-29 NOTE — H&P
"Critical Care  Note      05/29/2023    Name: Chacorta Mendoza MRN#: [id number]   Age: 87 year old YOB: 1935     Hsptl Day# 0  ICU DAY # 0    MV DAY # 0             Problem List:   Shock, possibly septic  Food impaction in esophagus  Possible aspiration pneumonia  H/o CKD 4  H/o diastolic heart failure, a fib,aortic valve replacement, LBBB, h/o heart block s/p pacer  H/o DM2  H/o espohatitis         Summary/Hospital Course:   87M who presented initially to OSH on 5/28 with finding of food impaction in his esophagus.  This was removed via endoscopy however the next day he ate solid food again and his esophagus became re-impacted.  He subsequently developed hypotension requiring initiation of vasopressors and was transferred to the ICU.  For some reason the OSH was unable to repeat endoscopy at their site so he was transferred to [facility] for more definitive management.  Per the h and p of the admitting physician at the outside hospital:  \"I was able to locate [an EGD report] in the [facility] system done in March of this year that revealed grade D esophagitis but no stricture.  He cannot recall ever having a dilation.  He did not follow the antireflux regimen recommended at that time.\"    On arrival here, patient is awake, alert and denies any concerns.  Specifically denies chest pain, SOB, dizziness.  Currently on 0.02 levophed although most recent MAP > 100.  According to nurse is very sensitive to dose changes.  Patient reports taking PTA anti-hypertensive meds but states last took yesterday.        Assessment and plan :     Chacorta Mendoza IS a 87 year old male admitted on 5/29 for shock and food impaction.    I have personally reviewed the daily labs, imaging studies, cultures and discussed the case with referring physician and consulting physicians.     My assessment and plan by system for this patient is as follows:    Neurology/Psychiatry:   1. Pain/analgesia: prn dilaudid    Cardiovascular:   1.  " Shock:  Etiology unclear but given clinical history he would be high risk for septic shock, possibly due to aspiration pna.  OSH physician also suspicous for vaso-vagal component given food impaction.  Got 2L crystalloid at OSH without much response, now started on levophed, running peripherally at very low dose.  Will wean as tolerated, goal MAP > 65.  2.  H/o diastolic heart failure: no evidence for acute exacerbation at this time.  Restart home bumex, aldactone, Entresto when clinically appropriate  3.  H/o a fib:  Hold warfarin for now.  INR 2.94, recheck in AM. Consider heparin gtt vs warfarin re-initiation w/ dosing per pharmacy when appropriate.  4.  H/o HLD:  Hold home atorva until able to take po    Pulmonary/Ventilator Management:   1. satting acceptably on RA to 2L.  CXR from earlier this AM stable to improved.  Repeat CXR in am  2. Per OSH records he has a history of JAZMIN but does not generally non-adherent to cpap.    GI and Nutrition :   1. Impacted food embolus:  Appreciate [facility] involvement.  S/p EGD with dilated esophagus and possible candida esophagitis; large amount of food noted but no evidence of eufemia obstruction.  SLP, full liquid diet.  2.  H/o esophagitis:  Cont protonix and sucralfate  3. ? Candida esophagitis.  Diflucan x 7d.    Renal/Fluids/Electrolytes:   1. H/o ckd 4:  Monitor creatinine and UOP.  Creat at baseline appears to be between 1.5 and 2.0.  Cont. LR at 50/hr overnight.    Infectious Disease:   1. Possible aspiration pneumonia:  Initiate zosyn therapy empirically here (5/29-).  CXR tomorrow am.  Consider stopping vs abbreviated course pending resolution of shock and repeat CXR.    Endocrine:   1. H/o hypothyroidism:  Resume home synthroid when able to take PO  2.  H/o DM2:  Insulin therapy as needed.    Hematology/Oncology:   1. Wbc 8.2 ok  2.  hgb 9.9 -- anemia likely 2/2 ckd. No apparent blood loss  3.  Thrombocytopenia to 112      ICU Prophylaxis:   1. DVT: SCDs tonight,  "INR 2.94 - will re-address anti-coag plan in AM.  2. Feeding - SLP, full liquid per GI.  3. Family Update: none, plan d/w patient.  4. Disposition - icu    Clinically Significant Risk Factors Present on Admission               # Coagulation Defect: INR = 2.94 (Ref range: 0.85 - 1.15) and/or PTT = N/A, will monitor for bleeding  # Thrombocytopenia: Lowest platelets = 112 in last 2 days, will monitor for bleeding   # Hypertension: Noted on problem list   # Acute Respiratory Failure: Documented O2 saturation < 91%.  Continue supplemental oxygen as needed     # Severe Obesity: Estimated body mass index is 41.51 kg/m  as calculated from the following:    Height as of 5/28/23: 1.854 m (6' 1\").    Weight as of an earlier encounter on 5/29/23: 142.7 kg (314 lb 9.5 oz).         # Anemia: based on hgb <11            Anne Light MD  Pulmonary and Critical Care Medicine    Critical care time = 45 minutes excluding procedures            Physical Examination:   Temp:  [95.6  F (35.3  C)-98.2  F (36.8  C)] 97.1  F (36.2  C)  Pulse:  [] 66  Resp:  [6-41] 6  BP: ()/(34-77) 110/76  SpO2:  [86 %-100 %] 93 %  No intake or output data in the 24 hours ending 05/29/23 1654  Wt Readings from Last 4 Encounters:   05/29/23 142.7 kg (314 lb 9.5 oz)     BP - Mean:  [] 59  Resp: (!) 6    No lab results found in last 7 days.    GEN: no acute distress.  Awake and alert.  HEENT: head ncat, sclera anicteric, OP patent, trachea midline   PULM: unlabored synchronous, clear anteriorly    CV/COR: RRR S1S2  ABD: soft nontender, hypoactive bowel sounds, no mass  EXT:  warm and well perfused x4  NEURO: PERRL, no obvious deficits  SKIN: no obvious rash  LINES: clean, dry intact         Data:   All data and imaging reviewed     ROUTINE ICU LABS (Last four results)  CMP  Recent Labs   Lab 05/29/23  1615 05/29/23  0520 05/28/23  1800 05/28/23  0709   NA  --  142  --  143   POTASSIUM  --  3.4  --  3.5   CHLORIDE  --  102  --  101   CO2  " --  27  --  27   ANIONGAP  --  13  --  15   * 169* 123* 143*   BUN  --  43.0*  --  48.7*   CR  --  1.69*  --  1.92*   GFRESTIMATED  --  39*  --  34*   MATTHEW  --  9.1  --  9.7   PROTTOTAL  --   --   --  7.7   ALBUMIN  --   --   --  4.0   BILITOTAL  --   --   --  0.8   ALKPHOS  --   --   --  85   AST  --   --   --  19   ALT  --   --   --  11     CBC  Recent Labs   Lab 05/29/23  0520 05/28/23  0709   WBC 8.2 6.6   RBC 3.13* 3.40*   HGB 9.9* 10.6*   HCT 31.0* 32.9*   MCV 99 97   MCH 31.6 31.2   MCHC 31.9 32.2   RDW 15.5* 15.2*   * 126*     INR  Recent Labs   Lab 05/29/23  0520 05/28/23  0709   INR 2.94* 2.30*     Arterial Blood GasNo lab results found in last 7 days.    All cultures:  No results for input(s): CULT in the last 168 hours.  Recent Results (from the past 24 hour(s))   XR Chest Port 1 View    Narrative    EXAM: XR CHEST PORT 1 VIEW  LOCATION: [facility]  DATE/TIME: 5/29/2023 9:06 AM CDT    INDICATION: Chronic heart failure.  COMPARISON: Chest radiograph 05/28/2023.      Impression    IMPRESSION:    Cardiomegaly and mild interstitial edema have not significant changed since yesterday's exam. Patchy airspace opacities at the lung bases have improved and may reflect improving atelectasis. Left lower lung calcified granuloma is unchanged. No pleural   effusions. No pneumothorax. Atherosclerotic aortic arch calcifications.              Past Medical/surgical hx, meds, allergies, social history, family history   No past medical history on file.  No past surgical history on file.  [COMPLETED] 0.9% sodium chloride BOLUS  [COMPLETED] 0.9% sodium chloride BOLUS  [COMPLETED] 0.9% sodium chloride BOLUS  [COMPLETED] 0.9% sodium chloride BOLUS  [Held by provider] acetaminophen (TYLENOL) tablet 500 mg  [Held by provider] allopurinol (ZYLOPRIM) tablet 100 mg  [Held by provider] atorvastatin (LIPITOR) tablet 10 mg  benzocaine-menthol (CHLORASEPTIC) 6-10 MG lozenge 1 lozenge  [Held by  provider] bumetanide (BUMEX) tablet 2 mg  [Held by provider] calcitRIOL (ROCALTROL) capsule 0.25 mcg  enoxaparin ANTICOAGULANT (LOVENOX) injection 40 mg  [Held by provider] ferrous sulfate (FEROSUL) tablet 325 mg  [] flumazenil (ROMAZICON) injection 0.2 mg  [Held by provider] gabapentin (NEURONTIN) capsule 100 mg  [Held by provider] levothyroxine (SYNTHROID/LEVOTHROID) tablet 100 mcg  lidocaine (LMX4) kit  lidocaine 1 % 0.1-1 mL  miconazole (MICATIN) 2 % powder  naloxone (NARCAN) injection 0.2 mg  naloxone (NARCAN) injection 0.2 mg  naloxone (NARCAN) injection 0.4 mg  naloxone (NARCAN) injection 0.4 mg  norepinephrine (LEVOPHED) 4 mg in  mL PERIPHERAL infusion  pantoprazole (PROTONIX) IV push injection 40 mg  phenylephrine (ASHLEY-SYNEPHRINE) injection 100 mcg  [Held by provider] potassium chloride (KAYCIEL) solution 20 mEq  [Held by provider] rOPINIRole (REQUIP) tablet 1 mg  [Held by provider] sacubitril-valsartan half-tab 12-13 mg  sodium chloride (PF) 0.9% PF flush 3 mL  sodium chloride (PF) 0.9% PF flush 3 mL  [Held by provider] spironolactone (ALDACTONE) half-tab 12.5 mg  [Held by provider] sucralfate (CARAFATE) tablet 1 g  [Held by provider] warfarin ANTICOAGULANT (COUMADIN) tablet 2 mg  Warfarin Dose Required Daily - Pharmacist Managed    acetaminophen (TYLENOL) 500 MG tablet, Take 500 mg by mouth daily  allopurinol (ZYLOPRIM) 100 MG tablet, Take 100 mg by mouth daily  atorvastatin (LIPITOR) 10 MG tablet, Take 10 mg by mouth daily  bumetanide (BUMEX) 2 MG tablet, Take 2 mg by mouth 2 times daily 2 tabs at 0800 and 1 tab at 1300  calcitRIOL (ROCALTROL) 0.25 MCG capsule, Take 0.25 mcg by mouth daily  ferrous sulfate (FEROSUL) 325 (65 Fe) MG tablet, Take 325 mg by mouth daily (with breakfast)  gabapentin (NEURONTIN) 100 MG capsule, Take 100 mg by mouth daily  levothyroxine (SYNTHROID/LEVOTHROID) 100 MCG tablet, Take 100 mcg by mouth daily  Multiple Vitamin (MULTIVITAMIN ADULT) TABS, Take 1 tablet by  mouth daily  OTHER MEDICAL SUPPLIES, by Other route See Admin Instructions Diabetic shoe  pantoprazole (PROTONIX) 40 MG EC tablet, Take 40 mg by mouth 2 times daily (before meals)  potassium chloride ER (KLOR-CON M) 10 MEQ CR tablet, Take 2 tablets by mouth daily with food  rOPINIRole (REQUIP) 1 MG tablet, Take 1 mg by mouth At Bedtime  sacubitril-valsartan (ENTRESTO) 24-26 MG per tablet, Take 0.5 tablets by mouth 2 times daily  spironolactone (ALDACTONE) 25 MG tablet, Take 0.5 mg by mouth daily  sucralfate (CARAFATE) 1 GM tablet, Take 1 g by mouth 2 times daily (with meals)  urea (CARMOL) 10 % external cream, Apply 1 Application topically as needed  warfarin ANTICOAGULANT (COUMADIN) 4 MG tablet, Take 4 mg by mouth daily 4 mg Monday, Wednesday, Friday and 6 mg the other 4 days         Allergies   Allergen Reactions     Levofloxacin Muscle Pain (Myalgia) and Other (See Comments)     Other reaction(s): Myalgias  Muscle Pain  Muscle Pain  Muscle Pain       Lisinopril Other (See Comments)     Changed to ARB due to rising creatinine  Changed to ARB due to rising creatinine  Changed to ARB due to rising creatinine       Social History     Socioeconomic History     Marital status:      Spouse name: Not on file     Number of children: Not on file     Years of education: Not on file     Highest education level: Not on file   Occupational History     Not on file   Tobacco Use     Smoking status: Not on file     Smokeless tobacco: Not on file   Substance and Sexual Activity     Alcohol use: Not on file     Drug use: Not on file     Sexual activity: Not on file   Other Topics Concern     Not on file   Social History Narrative     Not on file     Social Determinants of Health     Financial Resource Strain: Not on file   Food Insecurity: Not on file   Transportation Needs: Not on file   Physical Activity: Not on file   Stress: Not on file   Social Connections: Not on file   Intimate Partner Violence: Not on file   Housing  Stability: Not on file     No family history on file.

## 2023-05-29 NOTE — PROGRESS NOTES
Sepsis Evaluation     I was called to see Chacorta Mendoza due to abnormal vital signs triggering the Sepsis SIRS screening alert. He is not known to have an infection.     PHYSICAL EXAM  Vital Signs:  Temp: 98.2  F (36.8  C) Temp src: Oral BP: (!) 83/34 Pulse: 60   Resp: 16 SpO2: 92 % O2 Device: None (Room air) Oxygen Delivery: 4 LPM    General: in no acute distress  Mental Status: baseline mental status.     Remainder of physical exam is significant for dehydration/ hypotension    DATA  Lactic Acid   Date Value Ref Range Status   05/29/2023 2.7 (H) 0.7 - 2.0 mmol/L Final       ASSESSMENT AND PLAN  NO EVIDENCE OF SEPSIS at this time.  Vital sign, physical exam, and lab findings are due to dehydration from diuretics and no po intake.    Disposition: The patient will remain on the current unit. We will continue to monitor this patient closely..  uJan C Valencia MD  05/29/23, 11:27 AM    Sepsis Criteria   Sepsis: The body's generalized inflammatory state as a response to an infection. Sepsis Predictive Model includes >80 variable to alert to potential sepsis.  Severe Sepsis: Sepsis plus one or more variables of acute organ dysfunction (Note: lactic acid >2 or acute encephalopathy each qualify as organ dysfunction)  Septic Shock: Sepsis AND hypotension despite adequate volume resuscitation with crystalloid or lactic acid >=4  Note: HYPOTENSION is defined as 2 BP readings measured 3 hrs apart that have a SBP <90, MAP <65, or decrease >40 mmHg, occurring 6 hrs before or after t-zero

## 2023-05-30 ENCOUNTER — APPOINTMENT (OUTPATIENT)
Dept: PHYSICAL THERAPY | Facility: CLINIC | Age: 88
DRG: 393 | End: 2023-05-30
Attending: INTERNAL MEDICINE
Payer: MEDICARE

## 2023-05-30 ENCOUNTER — APPOINTMENT (OUTPATIENT)
Dept: SPEECH THERAPY | Facility: CLINIC | Age: 88
DRG: 393 | End: 2023-05-30
Attending: INTERNAL MEDICINE
Payer: MEDICARE

## 2023-05-30 ENCOUNTER — APPOINTMENT (OUTPATIENT)
Dept: GENERAL RADIOLOGY | Facility: CLINIC | Age: 88
DRG: 393 | End: 2023-05-30
Attending: INTERNAL MEDICINE
Payer: MEDICARE

## 2023-05-30 LAB
ANION GAP SERPL CALCULATED.3IONS-SCNC: 12 MMOL/L (ref 7–15)
BUN SERPL-MCNC: 41.9 MG/DL (ref 8–23)
CALCIUM SERPL-MCNC: 8.8 MG/DL (ref 8.8–10.2)
CHLORIDE SERPL-SCNC: 102 MMOL/L (ref 98–107)
CREAT SERPL-MCNC: 1.82 MG/DL (ref 0.67–1.17)
DEPRECATED HCO3 PLAS-SCNC: 25 MMOL/L (ref 22–29)
ERYTHROCYTE [DISTWIDTH] IN BLOOD BY AUTOMATED COUNT: 15.4 % (ref 10–15)
GFR SERPL CREATININE-BSD FRML MDRD: 36 ML/MIN/1.73M2
GLUCOSE SERPL-MCNC: 112 MG/DL (ref 70–99)
HCT VFR BLD AUTO: 33.6 % (ref 40–53)
HGB BLD-MCNC: 10.6 G/DL (ref 13.3–17.7)
INR PPP: 2.96 (ref 0.85–1.15)
MAGNESIUM SERPL-MCNC: 2 MG/DL (ref 1.7–2.3)
MCH RBC QN AUTO: 31.4 PG (ref 26.5–33)
MCHC RBC AUTO-ENTMCNC: 31.5 G/DL (ref 31.5–36.5)
MCV RBC AUTO: 99 FL (ref 78–100)
PHOSPHATE SERPL-MCNC: 2.6 MG/DL (ref 2.5–4.5)
PLATELET # BLD AUTO: 116 10E3/UL (ref 150–450)
POTASSIUM SERPL-SCNC: 3.5 MMOL/L (ref 3.4–5.3)
RBC # BLD AUTO: 3.38 10E6/UL (ref 4.4–5.9)
SODIUM SERPL-SCNC: 139 MMOL/L (ref 136–145)
UPPER GI ENDOSCOPY: NORMAL
WBC # BLD AUTO: 9 10E3/UL (ref 4–11)

## 2023-05-30 PROCEDURE — 85027 COMPLETE CBC AUTOMATED: CPT | Performed by: INTERNAL MEDICINE

## 2023-05-30 PROCEDURE — 80048 BASIC METABOLIC PNL TOTAL CA: CPT | Performed by: INTERNAL MEDICINE

## 2023-05-30 PROCEDURE — 99291 CRITICAL CARE FIRST HOUR: CPT | Performed by: INTERNAL MEDICINE

## 2023-05-30 PROCEDURE — 85610 PROTHROMBIN TIME: CPT

## 2023-05-30 PROCEDURE — G0463 HOSPITAL OUTPT CLINIC VISIT: HCPCS | Mod: 25

## 2023-05-30 PROCEDURE — 83735 ASSAY OF MAGNESIUM: CPT | Performed by: INTERNAL MEDICINE

## 2023-05-30 PROCEDURE — 250N000011 HC RX IP 250 OP 636: Performed by: INTERNAL MEDICINE

## 2023-05-30 PROCEDURE — 97602 WOUND(S) CARE NON-SELECTIVE: CPT

## 2023-05-30 PROCEDURE — C9113 INJ PANTOPRAZOLE SODIUM, VIA: HCPCS | Performed by: INTERNAL MEDICINE

## 2023-05-30 PROCEDURE — 36415 COLL VENOUS BLD VENIPUNCTURE: CPT | Performed by: INTERNAL MEDICINE

## 2023-05-30 PROCEDURE — 200N000001 HC R&B ICU

## 2023-05-30 PROCEDURE — 250N000013 HC RX MED GY IP 250 OP 250 PS 637: Performed by: INTERNAL MEDICINE

## 2023-05-30 PROCEDURE — 71045 X-RAY EXAM CHEST 1 VIEW: CPT

## 2023-05-30 PROCEDURE — 92610 EVALUATE SWALLOWING FUNCTION: CPT | Mod: GN | Performed by: SPEECH-LANGUAGE PATHOLOGIST

## 2023-05-30 PROCEDURE — 97162 PT EVAL MOD COMPLEX 30 MIN: CPT | Mod: GP

## 2023-05-30 PROCEDURE — 97530 THERAPEUTIC ACTIVITIES: CPT | Mod: GP

## 2023-05-30 PROCEDURE — 84100 ASSAY OF PHOSPHORUS: CPT | Performed by: INTERNAL MEDICINE

## 2023-05-30 PROCEDURE — 92526 ORAL FUNCTION THERAPY: CPT | Mod: GN | Performed by: SPEECH-LANGUAGE PATHOLOGIST

## 2023-05-30 RX ORDER — DOXYCYCLINE 25 MG/5ML
100 POWDER, FOR SUSPENSION ORAL 2 TIMES DAILY
Status: COMPLETED | OUTPATIENT
Start: 2023-05-30 | End: 2023-06-03

## 2023-05-30 RX ORDER — LEVOTHYROXINE SODIUM 100 UG/1
100 TABLET ORAL
Status: DISCONTINUED | OUTPATIENT
Start: 2023-05-30 | End: 2023-06-07 | Stop reason: HOSPADM

## 2023-05-30 RX ADMIN — SUCRALFATE ORAL 1 G: 1 SUSPENSION ORAL at 17:50

## 2023-05-30 RX ADMIN — PANTOPRAZOLE SODIUM 40 MG: 40 INJECTION, POWDER, FOR SOLUTION INTRAVENOUS at 08:13

## 2023-05-30 RX ADMIN — SUCRALFATE ORAL 1 G: 1 SUSPENSION ORAL at 11:15

## 2023-05-30 RX ADMIN — SUCRALFATE ORAL 1 G: 1 SUSPENSION ORAL at 06:45

## 2023-05-30 RX ADMIN — PIPERACILLIN AND TAZOBACTAM 3.38 G: 3; .375 INJECTION, POWDER, FOR SOLUTION INTRAVENOUS at 13:50

## 2023-05-30 RX ADMIN — FLUCONAZOLE 100 MG: 40 POWDER, FOR SUSPENSION ORAL at 08:20

## 2023-05-30 RX ADMIN — PIPERACILLIN AND TAZOBACTAM 3.38 G: 3; .375 INJECTION, POWDER, FOR SOLUTION INTRAVENOUS at 02:45

## 2023-05-30 RX ADMIN — SUCRALFATE ORAL 1 G: 1 SUSPENSION ORAL at 22:38

## 2023-05-30 RX ADMIN — PIPERACILLIN AND TAZOBACTAM 3.38 G: 3; .375 INJECTION, POWDER, FOR SOLUTION INTRAVENOUS at 20:25

## 2023-05-30 RX ADMIN — LEVOTHYROXINE SODIUM 100 MCG: 100 TABLET ORAL at 10:33

## 2023-05-30 RX ADMIN — DOXYCYCLINE 100 MG: 25 FOR SUSPENSION ORAL at 13:30

## 2023-05-30 RX ADMIN — DOXYCYCLINE 100 MG: 25 FOR SUSPENSION ORAL at 22:38

## 2023-05-30 RX ADMIN — PIPERACILLIN AND TAZOBACTAM 3.38 G: 3; .375 INJECTION, POWDER, FOR SOLUTION INTRAVENOUS at 08:13

## 2023-05-30 ASSESSMENT — ACTIVITIES OF DAILY LIVING (ADL)
ADLS_ACUITY_SCORE: 46
ADLS_ACUITY_SCORE: 50
ADLS_ACUITY_SCORE: 46
ADLS_ACUITY_SCORE: 50

## 2023-05-30 NOTE — CONSULTS
Care Management Initial Consult    General Information  Assessment completed with: Patient, Family, Children, Pt, 2 daughters  Type of CM/SW Visit: Offer D/C Planning    Primary Care Provider verified and updated as needed: Yes   Readmission within the last 30 days:        Reason for Consult: discharge planning  Advance Care Planning:            Communication Assessment  Patient's communication style: spoken language (English or Bilingual)    Hearing Difficulty or Deaf: no   Wear Glasses or Blind: yes    Cognitive  Cognitive/Neuro/Behavioral: WDL                      Living Environment:   People in home: child(sarah), adult     Current living Arrangements: house      Able to return to prior arrangements: yes       Family/Social Support:  Care provided by: self, child(sarah)  Provides care for: no one  Marital Status:              Description of Support System:           Current Resources:   Patient receiving home care services: No     Community Resources:    Equipment currently used at home: shower chair, tub bench, walker, standard  Supplies currently used at home:      Employment/Financial:  Employment Status: retired        Financial Concerns:             Does the patient's insurance plan have a 3 day qualifying hospital stay waiver?  No    Lifestyle & Psychosocial Needs:  Social Determinants of Health     Tobacco Use: Not on file   Alcohol Use: Not on file   Financial Resource Strain: Not on file   Food Insecurity: Not on file   Transportation Needs: Not on file   Physical Activity: Not on file   Stress: Not on file   Social Connections: Not on file   Intimate Partner Violence: Not on file   Depression: Not on file   Housing Stability: Not on file       Functional Status:  Prior to admission patient needed assistance:              Mental Health Status:          Chemical Dependency Status:                Values/Beliefs:  Spiritual, Cultural Beliefs, Tenriism Practices, Values that affect care:              "    Additional Information:  Met with patient and 2 daughters Esther and Myla at bedside to discuss role in discharge planning. Pt lives in house with son in [place], [place].  Pt reports that son drives him and helps with cooking, cleaning, putting on socks, shoes and other household tasks.  Pt has been open to [facility] through [facility] (RN, PT) but this recently ended.  Daughters added that patient is followed by [facility].  Pt has walkers and family is building a walk in shower (should be completed tomorrow).  Pt is normally on RA and does not follow a modified diet. Pt's goal is to return home.  Family would like him to have [facility] again (PT, RN, HHA, SLP) and would like [facility] if available. Family can transport at discharge.  Daughters will help make Follow-up appointments.   They note Home RN has been able to do INR draws.   Note left for MD to order at discharge.     Daughter Esther asked to speak to CC outside of room.  She noted that Pt's son has some issues with alcohol and Pt \"protects him\" and did not share all that patient is still doing at home.  Daughter notes that patient does cooking and yard work still.  Daughters check in as able but are concerned that if patient needed help and son was \"passed out\" he would not get help needed.  Daughter does state it is Pt's goal to \"die in the home\" and they have been working with Palliative but do not want to enroll in hospice yet.   Goal is still to return home with [facility].  Discussed option of life alert, camera monitors, and meal delivery.   Resources given.    CC called [facility] ( Dania)[phone] .  Services ended on May 5th and included Home RN/PT.  They can accept back for new start of care for RN, PT, HHA, SLP (SLP is \"out awhile\")  Initial intake sent via [facility]/ [facility] tab.  At discharge orders should be faxed to [phone]    Addendum 1415:  Received call from [facility] intake navigation that they are now not able to accept this patient.  CC " called Monica Ivy, Pt's previous [facility] nurse to ask if exception could be made as Pt was really wanting to stay with [facility].  She will reach out to supervisor and ask. Received call back from [facility].  Currently they could not do nursing start of care till next week.  Pending d/c CC will need to confirm whether [facility] could take back.     Addendum 1430:  Daughter Esther stopped CC in faith and stated that family would now want to consider TCU and asked what that entailed.  Reviewed medicare website.  Pt stated he would prefer to go home but would consider TCU.   Await PT recommendations.     CC will continue to follow for discharge planning.     Mireille Lemus RN

## 2023-05-30 NOTE — PROGRESS NOTES
"Critical Care  Note      05/30/2023    Name: Chacorta Mendoza MRN#: [id number]   Age: 87 year old YOB: 1935     Hsptl Day# 1  ICU DAY # 1    MV DAY # 1             Problem List:   Shock, possibly septic  Food impaction in esophagus  Possible aspiration pneumonia  H/o CKD 4  H/o diastolic heart failure, a fib,aortic valve replacement, LBBB, h/o heart block s/p pacer  H/o DM2  H/o espohatitis         Summary/Hospital Course:   87M who presented initially to OSH on 5/28 with finding of food impaction in his esophagus.  This was removed via endoscopy however the next day he ate solid food again and his esophagus became re-impacted.  He subsequently developed hypotension requiring initiation of vasopressors and was transferred to the ICU.  For some reason the OSH was unable to repeat endoscopy at their site so he was transferred to [facility] for more definitive management.  Per the h and p of the admitting physician at the outside hospital:  \"I was able to locate [an EGD report] in the [facility] system done in March of this year that revealed grade D esophagitis but no stricture.  He cannot recall ever having a dilation.  He did not follow the antireflux regimen recommended at that time.\"    After arrival here, patient underwent EGD by Dr. Beck with successful advancement of food embolus into the stomach. He was found to have an irregular Zline about 42 cm from the incisors.  No stricture.  He continues on low dose levophed but this seems to be weaning off well.  He is in good spirits this morning and denies complaints.  He is excited to start on a full liquid diet.      Assessment and plan :     Chacorta Mendoza IS a 87 year old male admitted on 5/29 for shock and food impaction.    I have personally reviewed the daily labs, imaging studies, cultures and discussed the case with referring physician and consulting physicians.     My assessment and plan by system for this patient is as " follows:    Neurology/Psychiatry:   1. Pain/analgesia: prn dilaudid    Cardiovascular:   1.  Shock:  Etiology unclear but given clinical history he would be high risk for septic shock, possibly due to aspiration pna.  OSH physician also suspicous for vaso-vagal component given food impaction.  Got 2L crystalloid at OSH without much response, now started on levophed, running peripherally at very low dose.  Will wean as tolerated, goal MAP > 65.  2.  H/o diastolic heart failure: no evidence for acute exacerbation at this time.  Restart home bumex, aldactone, Entresto when clinically appropriate  3.  H/o a fib:   INR 2.96, recheck daily. Will put in pharmacy warfarin consult to restart when appropriate.  4.  H/o HLD:  Hold home atorva until able to take po  5.  H/o aortic valve replacement:  Appears to have been a tavr in 2019 with bioprosthetic valve.    Pulmonary/Ventilator Management:   1. satting acceptably on RA to 2L.  CXR with mild b/l haziness; ?mild pulmonary edema? But no clinical manifestations.  2. Per OSH records he has a history of JAZMIN but does not generally non-adherent to cpap.    GI and Nutrition :   1. Impacted food embolus:  Appreciate [facility] involvement.  S/p EGD with dilated esophagus and possible candida esophagitis; large amount of food noted but no evidence of eufemia obstruction.  SLP, full liquid diet.  2.  H/o esophagitis:  Cont protonix and sucralfate  3. ? Candida esophagitis.  Diflucan x 7d.    Renal/Fluids/Electrolytes:   1. H/o ckd 4:  Monitor creatinine and UOP.  Creat 1.82 this AM.  Creat at baseline appears to be between 1.5 and 2.0.  Stop LR today--to KVO.    Infectious Disease:   1. Possible aspiration pneumonia:  continue zosyn therapy empirically (5/29-).  Consider stopping vs abbreviated course pending resolution of shock and repeat CXR.   2.  Notified that patient had a tick removed from his left forearm a few days ago and on exam he does appear to have a target lesion at the  "site.  No other signs/sxs of lyme disease.  Will empirically treat for early localized Lyme disease:  Doxycycline  mg, 10 day course.    Endocrine:   1. H/o hypothyroidism:  Resume home synthroid  2.  H/o DM2:  Insulin therapy as needed.     Hematology/Oncology:   1. Wbc 9.0 ok  2.  hgb 10.6 -- anemia likely 2/2 ckd. No apparent blood loss  3.  Thrombocytopenia to 116      ICU Prophylaxis:   1. DVT: SCDs tonight, INR 2.96 - will re-address anti-coag plan in AM.  2. Feeding - SLP, full liquid per GI.  3. Family Update: pt himself at bedside  4. Disposition - icu    Clinically Significant Risk Factors Present on Admission              # Hypoalbuminemia: Lowest albumin = 3.2 g/dL at 5/29/2023 10:04 PM, will monitor as appropriate  # Drug Induced Coagulation Defect: home medication list includes an anticoagulant medication  # Thrombocytopenia: Lowest platelets = 109 in last 2 days, will monitor for bleeding   # Hypertension: Noted on problem list   # Circulatory Shock: currently requiring pressors for blood pressure support  # Acute Respiratory Failure: Documented O2 saturation < 91%.  Continue supplemental oxygen as needed     # Severe Obesity: Estimated body mass index is 41.3 kg/m  as calculated from the following:    Height as of 5/28/23: 1.854 m (6' 1\").    Weight as of this encounter: 142 kg (313 lb 0.9 oz).         # Anemia: based on hgb <11           Critical care time = 35 minutes excluding procedures            Physical Examination:   Temp:  [97.1  F (36.2  C)-98.4  F (36.9  C)] 98.4  F (36.9  C)  Pulse:  [59-76] 64  Resp:  [6-41] 13  BP: ()/(34-85) 96/49  SpO2:  [86 %-100 %] 100 %  No intake or output data in the 24 hours ending 05/29/23 1654  Wt Readings from Last 4 Encounters:   05/30/23 142 kg (313 lb 0.9 oz)   05/29/23 142.7 kg (314 lb 9.5 oz)     BP - Mean:  [] 66  Resp: 13    No lab results found in last 7 days.    GEN: no acute distress.  Awake and alert.  HEENT: head ncat, sclera " anicteric, OP patent, trachea midline   PULM: unlabored synchronous, clear anteriorly    CV/COR: RRR S1S2  ABD: soft nontender, hypoactive bowel sounds, no mass  EXT:  warm and well perfused x4  NEURO: PERRL, no obvious deficits  SKIN: no obvious rash, lesion which could be c/w erythema migrans of L forearm at site of tick bite last week  LINES: clean, dry intact         Data:   All data and imaging reviewed     ROUTINE ICU LABS (Last four results)  CMP  Recent Labs   Lab 05/30/23 0519 05/29/23 2204 05/29/23  1950 05/29/23  1615 05/29/23 0520 05/28/23  1800 05/28/23  0709    138  --   --  142  --  143   POTASSIUM 3.5 3.4  --   --  3.4  --  3.5   CHLORIDE 102 101  --   --  102  --  101   CO2 25 25  --   --  27  --  27   ANIONGAP 12 12  --   --  13  --  15   * 115* 100* 104* 169*   < > 143*   BUN 41.9* 44.6*  --   --  43.0*  --  48.7*   CR 1.82* 1.86*  --   --  1.69*  --  1.92*   GFRESTIMATED 36* 35*  --   --  39*  --  34*   MATTHEW 8.8 8.6*  --   --  9.1  --  9.7   MAG 2.0 1.9  --   --   --   --   --    PHOS 2.6 2.8  --   --   --   --   --    PROTTOTAL  --  6.5  --   --   --   --  7.7   ALBUMIN  --  3.2*  --   --   --   --  4.0   BILITOTAL  --  0.7  --   --   --   --  0.8   ALKPHOS  --  80  --   --   --   --  85   AST  --  20  --   --   --   --  19   ALT  --  12  --   --   --   --  11    < > = values in this interval not displayed.     CBC  Recent Labs   Lab 05/30/23 0519 05/29/23 2204 05/29/23  0520 05/28/23  0709   WBC 9.0 9.1 8.2 6.6   RBC 3.38* 3.22* 3.13* 3.40*   HGB 10.6* 10.1* 9.9* 10.6*   HCT 33.6* 31.9* 31.0* 32.9*   MCV 99 99 99 97   MCH 31.4 31.4 31.6 31.2   MCHC 31.5 31.7 31.9 32.2   RDW 15.4* 15.4* 15.5* 15.2*   * 109* 112* 126*     INR  Recent Labs   Lab 05/30/23  0519 05/29/23  0520 05/28/23  0709   INR 2.96* 2.94* 2.30*     Arterial Blood GasNo lab results found in last 7 days.    All cultures:  No results for input(s): CULT in the last 168 hours.  Recent Results (from the past 24  hour(s))   XR Chest Port 1 View    Narrative    EXAM: XR CHEST PORT 1 VIEW  LOCATION: [facility]  DATE/TIME: 5/29/2023 9:06 AM CDT    INDICATION: Chronic heart failure.  COMPARISON: Chest radiograph 05/28/2023.      Impression    IMPRESSION:    Cardiomegaly and mild interstitial edema have not significant changed since yesterday's exam. Patchy airspace opacities at the lung bases have improved and may reflect improving atelectasis. Left lower lung calcified granuloma is unchanged. No pleural   effusions. No pneumothorax. Atherosclerotic aortic arch calcifications.              Past Medical/surgical hx, meds, allergies, social history, family history   No past medical history on file.  No past surgical history on file.  [COMPLETED] 0.9% sodium chloride BOLUS  [COMPLETED] 0.9% sodium chloride BOLUS  [COMPLETED] 0.9% sodium chloride BOLUS  [COMPLETED] 0.9% sodium chloride BOLUS    acetaminophen (TYLENOL) 500 MG tablet, Take 500 mg by mouth daily  allopurinol (ZYLOPRIM) 100 MG tablet, Take 100 mg by mouth daily  atorvastatin (LIPITOR) 10 MG tablet, Take 10 mg by mouth daily  bumetanide (BUMEX) 2 MG tablet, Take 2 mg by mouth 2 times daily 2 tabs at 0800 and 1 tab at 1300  calcitRIOL (ROCALTROL) 0.25 MCG capsule, Take 0.25 mcg by mouth daily  ferrous sulfate (FEROSUL) 325 (65 Fe) MG tablet, Take 325 mg by mouth daily (with breakfast)  gabapentin (NEURONTIN) 100 MG capsule, Take 100 mg by mouth daily  levothyroxine (SYNTHROID/LEVOTHROID) 100 MCG tablet, Take 100 mcg by mouth daily  Multiple Vitamin (MULTIVITAMIN ADULT) TABS, Take 1 tablet by mouth daily  pantoprazole (PROTONIX) 40 MG EC tablet, Take 40 mg by mouth 2 times daily (before meals)  potassium chloride ER (KLOR-CON M) 10 MEQ CR tablet, Take 2 tablets by mouth daily with food  rOPINIRole (REQUIP) 1 MG tablet, Take 1 mg by mouth At Bedtime  sacubitril-valsartan (ENTRESTO) 24-26 MG per tablet, Take 0.5 tablets by mouth 2 times  daily  spironolactone (ALDACTONE) 25 MG tablet, Take 0.5 mg by mouth daily  sucralfate (CARAFATE) 1 GM tablet, Take 1 g by mouth 2 times daily (with meals)  urea (CARMOL) 10 % external cream, Apply 1 Application topically as needed  warfarin ANTICOAGULANT (COUMADIN) 4 MG tablet, Take 4 mg by mouth daily 4 mg Monday, Wednesday, Friday and 6 mg the other 4 days  OTHER MEDICAL SUPPLIES, by Other route See Admin Instructions Diabetic shoe         Allergies   Allergen Reactions     Levofloxacin Muscle Pain (Myalgia) and Other (See Comments)     Other reaction(s): Myalgias  Muscle Pain  Muscle Pain  Muscle Pain       Lisinopril Other (See Comments)     Changed to ARB due to rising creatinine  Changed to ARB due to rising creatinine  Changed to ARB due to rising creatinine       Social History     Socioeconomic History     Marital status:      Spouse name: Not on file     Number of children: Not on file     Years of education: Not on file     Highest education level: Not on file   Occupational History     Not on file   Tobacco Use     Smoking status: Not on file     Smokeless tobacco: Not on file   Substance and Sexual Activity     Alcohol use: Not on file     Drug use: Not on file     Sexual activity: Not on file   Other Topics Concern     Not on file   Social History Narrative     Not on file     Social Determinants of Health     Financial Resource Strain: Not on file   Food Insecurity: Not on file   Transportation Needs: Not on file   Physical Activity: Not on file   Stress: Not on file   Social Connections: Not on file   Intimate Partner Violence: Not on file   Housing Stability: Not on file     No family history on file.

## 2023-05-30 NOTE — PROGRESS NOTES
"  SLP  Bedside Swallow Evaluation  05/30/23 9708   Appointment Info   Signing Clinician's Name / Credentials (SLP) Merle Mtz MA [facility] SLP   General Information   Onset of Illness/Injury or Date of Surgery 05/29/23   Referring Physician Goldie Light MD   Patient/Family Therapy Goal Statement (SLP) Agreed to POC goal and VFSS completion.   Pertinent History of Current Problem Per notes: 87M who presented initially to OSH on 5/28 with finding of food impaction in his esophagus.  This was removed via endoscopy however the next day he ate solid food again and his esophagus became re-impacted.  He subsequently developed hypotension requiring initiation of vasopressors and was transferred to the ICU.  For some reason the OSH was unable to repeat endoscopy at their site so he was transferred to [facility] for more definitive management.  Per the h and p of the admitting physician at the outside hospital:  \"I was able to locate (an EGD report) in the [facility] system done in March of this year that revealed grade D esophagitis but no stricture.  He cannot recall ever having a dilation.  He did not follow the antireflux regimen recommended at that time.\"  Shock:  Etiology unclear but given clinical history he would be high risk for septic shock, possibly due to aspiration pna.  OSH physician also suspicous for vaso-vagal component given food impaction;   5/29 EGD:   S/p EGD with dilated esophagus and possible candida esophagitis; large amount of food noted but no evidence of eufemia obstruction.   General Observations Pt was alert and cooperative.   Type of Evaluation   Type of Evaluation Swallow Evaluation   Oral Motor   Oral Musculature generally intact   Dentition (Oral Motor)   Dentition (Oral Motor) dental appliance/dentures  (Upper dentures - slightly loose, partial lower)   Cough/Swallow/Gag Reflex (Oral Motor)   Comment, Cough/Swallow/Gag Reflex (Oral Motor) Congested cough, productive with cues   Vocal " "Quality/Secretion Management (Oral Motor)   Secretion Management (Oral Motor)   (Mild phlegm noted on pt's soft palate, pt was able to clear and spit with mod cues.)   Comment, Vocal Quality/Secretion Management (Oral Motor) Hoarse   General Swallowing Observations   Past History of Dysphagia Per family, pt was hospitalized 2 months ago with food impaction, pt coughing up phlegm, but not food at that time; Hx of hiatal hernia but no GERD in the more distant past.  Prior to admit, pt was coughing/regurgitating with every bite along with phlegm that was \"bubbly\".   Respiratory Support (General Swallowing Observations) nasal cannula   Current Diet/Method of Nutritional Intake (General Swallowing Observations, NIS) NPO   Swallowing Evaluation Clinical swallow evaluation   Clinical Swallow Evaluation   Feeding Assistance minimal assistance required   Clinical Swallow Evaluation Textures Trialed thin liquids;pureed   Clinical Swallow Eval: Thin Liquid Texture Trial   Mode of Presentation, Thin Liquids cup;straw   Volume of Liquid or Food Presented sips by cup x 5, sips by straw x 3   Oral Phase of Swallow WFL   Pharyngeal Phase of Swallow reduction in laryngeal movement;repeated swallows   Diagnostic Statement no overt aspiration signs, slight burp; cough after trial by straw - ? related vs phlegm   Clinical Swallow Evaluation: Puree Solid Texture Trial   Mode of Presentation, Puree spoon   Volume of Puree Presented tsps x 4   Oral Phase, Puree WFL   Pharyngeal Phase, Puree reduction in laryngeal movement;repeated swallows   Diagnostic Statement no overt aspiration signs   Esophageal Phase of Swallow   Patient reports or presents with symptoms of esophageal dysphagia Yes   Esophageal comments See all notes above regarding esophagitis and food impaction episodes.  Pt began coughing 5 minutes after the po trials in conversation - ? related to po/GERD vs coughing on phlegm   Swallowing Recommendations   Diet Consistency " Recommendations full liquid diet;thin liquids (level 0)   Supervision Level for Intake close supervision needed   Mode of Delivery Recommendations no straws;bolus size, small;slow rate of intake   Swallowing Maneuver Recommendations alternate food and liquid intake;effortful (hard) swallow;extra swallow   Monitoring/Assistance Required (Eating/Swallowing) monitor for cough or change in vocal quality with intake   Recommended Feeding/Eating Techniques (Swallow Eval) maintain upright sitting position for eating;provide 6 smaller meals throughout day;minimize distractions during oral intake  (stay up for 60+ minutes after eating)   Medication Administration Recommendations, Swallowing (SLP) Meds with small sips, crush with pudding as needed   Instrumental Assessment Recommendations VFSS (videofluoroscopic swallowing study)   Comment, Swallowing Recommendations Hold po if aspiration signs are noted/respiratory status declines   Clinical Impression   Criteria for Skilled Therapeutic Interventions Met (SLP Eval) Yes, treatment indicated   SLP Diagnosis Mild oral-pharyngeal dysphagia; esophageal dysphagia   Risks & Benefits of therapy have been explained evaluation/treatment results reviewed;care plan/treatment goals reviewed;risks/benefits reviewed;current/potential barriers reviewed;participants voiced agreement with care plan;participants included;patient;daughter   Clinical Impression Comments Pt presents with mild oral-pharyngeal dysphagia and symptoms of esophageal dysphagia at bedside.  Deficits/risk factors include admit with food impaction, esophagitis, possible aspiration pneumonia, hx of recent food impaction/esophagitis, congested coughing on phlegm, decreased laryngeal elevation, need for multiple swallows, and cough after thin liquids by straw.  Pt also demonstrated coughing 5 minutes after po.  Difficult to determine coughing on phlegm vs po/reflux.  Recommend cautious initiation of a full liquid diet, no  straw, cues to use safe swallow strategies, and completion of a VFSS.  Hold po if aspiration signs are noted/respiratory status declines.  Plan to complete VFSS 5/31 as able.   SLP Total Evaluation Time   Eval: oral/pharyngeal swallow function, clinical swallow Minutes (73600) 15   Interventions   Interventions Quick Adds Swallowing Dysfunction   Swallowing Dysfunction &/or Oral Function for Feeding   Treatment of Swallowing Dysfunction &/or Oral Function for Feeding Minutes (95624) 15   Symptoms Noted During/After Treatment None   Treatment Detail/Skilled Intervention Swallow: Educated pt, family, and RN regarding current deficits, possible aspiration signs on reflux, VFSS rationale, and strategies with GI MD recommended full liquid diet.  All verbalized agreement/understanding.  Pt was able to take small sips by cup with mod cues.  No overt aspiration signs are noted.   SLP Discharge Planning   SLP Plan SLP - VFSS   SLP Discharge Recommendation home with home care speech therapy   SLP Rationale for DC Rec Swallow function is below baseline   SLP Brief overview of current status  Mild oral-pharyngeal dysphagia, signs of esophageal dysphagia;  Rec: caution with a full liquid diet, no straw, cues to use strategies; Hold po if aspiration signs are noted/respiratory status declines   Total Session Time   Total Session Time (sum of timed and untimed services) 30

## 2023-05-30 NOTE — CONSULTS
[facility]  Gastroenterology Consultation         Chacorta Mendoza  [address] [address] [facility] [address]  87 year old male    Admission Date/Time: 5/29/2023  Primary Care Provider: [facility]  Referring / Attending Physician: Dr. Hakeem Lehman    We were asked to see the patient in consultation by Dr. Carrillo for evaluation of dysphagia for solid food.      CC: Dysphagia for solid food was transferred here from [facility] where patient was admitted with significant respiratory compromise patient was found to have large amount of food impaction in the esophagus congestive heart failure who was hospitalized due to foreign body in the esophagus and acute bronchitis patient possibly devolve aspiration patient has been bit hypotensive for which patient is on pressors patient was in the ICU in [facility] patient whelp recurrent symptoms due to that patient was transferred to [facility].  Patient is currently in the ICU patient is feeling fairly comfortable patient is not in any acute distress patient's blood pressure is still on the 90s on pressors patient's heart rate is in 90s overall patient is laying fairly comfortably.  Patient is currently on 2 to 3 L of nasal cannula oxygen.  Patient is awake alert oriented comfortable.      HPI:  Chacorta Mendoza is a 87 year old male who patient has known history of atrial fibrillation dyspnea on exertion chronic weakness failure to thrive    ROS: A comprehensive ten point review of systems was negative aside from those in mentioned in the HPI.      PAST MED HX:  I have reviewed this patient's medical history and updated it with pertinent information if needed.   No past medical history on file.    MEDICATIONS: Prior to Admission Medications   Prescriptions Last Dose Informant Patient Reported? Taking?   Multiple Vitamin (MULTIVITAMIN ADULT) TABS  Self Yes No   Sig: Take 1 tablet by mouth daily   OTHER MEDICAL SUPPLIES   Yes No   Sig:  by Other route See Admin Instructions Diabetic shoe   acetaminophen (TYLENOL) 500 MG tablet  Self Yes No   Sig: Take 500 mg by mouth daily   allopurinol (ZYLOPRIM) 100 MG tablet  Self Yes No   Sig: Take 100 mg by mouth daily   atorvastatin (LIPITOR) 10 MG tablet  Self Yes No   Sig: Take 10 mg by mouth daily   bumetanide (BUMEX) 2 MG tablet  Self Yes No   Sig: Take 2 mg by mouth 2 times daily 2 tabs at 0800 and 1 tab at 1300   calcitRIOL (ROCALTROL) 0.25 MCG capsule  Self Yes No   Sig: Take 0.25 mcg by mouth daily   ferrous sulfate (FEROSUL) 325 (65 Fe) MG tablet   Yes No   Sig: Take 325 mg by mouth daily (with breakfast)   gabapentin (NEURONTIN) 100 MG capsule  Self Yes No   Sig: Take 100 mg by mouth daily   levothyroxine (SYNTHROID/LEVOTHROID) 100 MCG tablet  Self Yes No   Sig: Take 100 mcg by mouth daily   pantoprazole (PROTONIX) 40 MG EC tablet  Self Yes No   Sig: Take 40 mg by mouth 2 times daily (before meals)   potassium chloride ER (KLOR-CON M) 10 MEQ CR tablet  Self Yes No   Sig: Take 2 tablets by mouth daily with food   rOPINIRole (REQUIP) 1 MG tablet  Self Yes No   Sig: Take 1 mg by mouth At Bedtime   sacubitril-valsartan (ENTRESTO) 24-26 MG per tablet  Self Yes No   Sig: Take 0.5 tablets by mouth 2 times daily   spironolactone (ALDACTONE) 25 MG tablet   Yes No   Sig: Take 0.5 mg by mouth daily   sucralfate (CARAFATE) 1 GM tablet  Self Yes No   Sig: Take 1 g by mouth 2 times daily (with meals)   urea (CARMOL) 10 % external cream   Yes No   Sig: Apply 1 Application topically as needed   warfarin ANTICOAGULANT (COUMADIN) 4 MG tablet  Self Yes No   Sig: Take 4 mg by mouth daily 4 mg Monday, Wednesday, Friday and 6 mg the other 4 days      Facility-Administered Medications: None       ALLERGIES:   Allergies   Allergen Reactions     Levofloxacin Muscle Pain (Myalgia) and Other (See Comments)     Other reaction(s): Myalgias  Muscle Pain  Muscle Pain  Muscle Pain       Lisinopril Other (See Comments)     Changed  to ARB due to rising creatinine  Changed to ARB due to rising creatinine  Changed to ARB due to rising creatinine         SOCIAL HISTORY:       FAMILY HISTORY:  No family history on file.    PHYSICAL EXAM:   General awake alert oriented  Vital Signs with Ranges  Temp: 98.2  F (36.8  C) Temp src: Oral BP: (!) 71/56 Pulse: 61   Resp: 14 SpO2: 100 % O2 Device: Oxymask Oxygen Delivery: 2 LPM  No intake/output data recorded.    Cardiovascular: negative, PMI normal. No lifts, heaves, or thrills. RRR. No murmurs, clicks gallops or rub  Respiratory: negative, Percussion normal. Good diaphragmatic excursion. Lungs clear  Neck: Neck supple. No adenopathy. Thyroid symmetric, normal size,, Carotids without bruits.  Abdomen: Abdomen soft, non-tender. BS normal. No masses, organomegaly  SKIN: no suspicious lesions or rashes          ADDITIONAL COMMENTS:   I reviewed the patient's new clinical lab test results.   Recent Labs   Lab Test 05/29/23  0520 05/28/23  0709   WBC 8.2 6.6   HGB 9.9* 10.6*   MCV 99 97   * 126*   INR 2.94* 2.30*     Recent Labs   Lab Test 05/29/23  0520 05/28/23  0709   POTASSIUM 3.4 3.5   CHLORIDE 102 101   CO2 27 27   BUN 43.0* 48.7*   ANIONGAP 13 15     Recent Labs   Lab Test 05/28/23  0709   ALBUMIN 4.0   BILITOTAL 0.8   ALT 11   AST 19       I reviewed the patient's new imaging results.        CONSULTATION ASSESSMENT AND PLAN:    Principal Problem:    Shock (H)    Assessment: Very pleasant 87-year-old gentleman with a history of foreign body impaction status post endoscopy with recurrent symptoms patient continues to have hypotension requiring pressors patient was fed however patient developed recurrent symptoms of dysphagia giving patient persistent symptoms and need for pressors patient was transferred from [facility] to [facility] in the ICU.  Patient is fairly comfortable now laying in the bed patient is still soft on his blood pressure patient is requiring oxygen.  Plan  to proceed with repeat upper GI endoscopy risk-benefit plan discussed in detail.  Thank you very much for letting me participate in his care.                Juan Alberto Beck MD, FACP  [facility].  Office: [phone]  Cell : [phone]      [facility]  Ph: [phone] Fax: [phone]

## 2023-05-30 NOTE — PHARMACY-ADMISSION MEDICATION HISTORY
Admission medication history completed at [facility]. Please see Pharmacy - Admission Medication History note from 05/28/2023.

## 2023-05-30 NOTE — PROGRESS NOTES
05/30/23 1600   Appointment Info   Signing Clinician's Name / Credentials (PT) Sara Murphy, PT, DPT   Living Environment   People in Home child(sarah), adult   Current Living Arrangements house   Home Accessibility stairs to enter home   Number of Stairs, Main Entrance 2   Stair Railings, Main Entrance railings on both sides of stairs   Transportation Anticipated family or friend will provide   Living Environment Comments Patient lives with his son in a multi-level house, is able to reside on the main floor.  There are 2 steps to enter the home.  Patient sleeps in a recliner chair.   Self-Care   Usual Activity Tolerance moderate   Current Activity Tolerance fair   Equipment Currently Used at Home walker, rolling   Fall history within last six months no   Activity/Exercise/Self-Care Comment Patient reports baseline independence with toileting and ambulates with a rolling walker at baseline (owns FWW and 4WW).  Patient states he washes the dishes and son puts dishes away.  Son will wash the laundry and patient folds the laundry.  Son assists with cooking,  transportation, and managing socks/shoes.  Family is building a walk-in shower which will have a grab bar and built in bench.   General Information   Onset of Illness/Injury or Date of Surgery 05/29/23   Referring Physician Hakeem Lopez MD   Patient/Family Therapy Goals Statement (PT) Patient would like to return to home with son.   Pertinent History of Current Problem (include personal factors and/or comorbidities that impact the POC) 87M who presented initially to OSH on 5/28 with finding of food impaction in his esophagus.  This was removed via endoscopy however the next day he ate solid food again and his esophagus became re-impacted.  He subsequently developed hypotension requiring initiation of vasopressors and was transferred to the ICU.  For some reason the OSH was unable to repeat endoscopy at their site so he was transferred to [facility] for more  definitive management.   Existing Precautions/Restrictions fall   Cognition   Affect/Mental Status (Cognition) WFL   Orientation Status (Cognition) oriented x 4   Follows Commands (Cognition) WFL;delayed response/completion;increased processing time needed   Pain Assessment   Patient Currently in Pain No   Integumentary/Edema   Integumentary/Edema Comments Age-related skin changes, B LE edema with hemosiderian stain, wounds noted on B legs   Posture    Posture Forward head position;Protracted shoulders   Range of Motion (ROM)   Range of Motion ROM is WFL   Strength (Manual Muscle Testing)   Strength (Manual Muscle Testing) Able to perform R SLR;Able to perform L SLR;Deficits observed during functional mobility   Bed Mobility   Comment, (Bed Mobility) Not assessed, starting and ending visit in recliner chair.  Patient sleeps in a recliner chair at baseline.   Transfers   Comment, (Transfers) Patient performs sit <> stand from recliner chair with CGA, heavy UE support required on armrest of chair.   Gait/Stairs (Locomotion)   Comment, (Gait/Stairs) Patient ambulates 3' with FWW and CGAx2, demonstrating forward flexed posture and slowed gait speed.   Balance   Balance Comments Impaired dynamic balance, baseline use of rolling walker   Sensory Examination   Sensory Perception patient reports no sensory changes   Clinical Impression   Criteria for Skilled Therapeutic Intervention Yes, treatment indicated   PT Diagnosis (PT) Impaired functional mobility   Influenced by the following impairments Generalized weakness and deconditioning, LE edema and wounds, decreased activity tolerance, increased O2 demands   Functional limitations due to impairments Impaired independence with bed mobility, transfers, gait, and stair negotiation   Clinical Presentation (PT Evaluation Complexity) Evolving/Changing   Clinical Presentation Rationale Clinical judgement, PMH, social support   Clinical Decision Making (Complexity) moderate  complexity   Planned Therapy Interventions (PT) balance training;bed mobility training;gait training;home exercise program;neuromuscular re-education;patient/family education;postural re-education;stair training;strengthening;transfer training;progressive activity/exercise;home program guidelines   Anticipated Equipment Needs at Discharge (PT) walker, rolling  (Patient owns multiple FWW and 4WW.)   Risk & Benefits of therapy have been explained evaluation/treatment results reviewed;care plan/treatment goals reviewed;risks/benefits reviewed;participants voiced agreement with care plan;participants included;patient   PT Total Evaluation Time   PT Eval, Moderate Complexity Minutes (94019) 11   Physical Therapy Goals   PT Frequency 6x/week   PT Predicted Duration/Target Date for Goal Attainment 06/02/23   PT Goals Transfers;Gait;Stairs   PT: Transfers Supervision/stand-by assist;Sit to/from stand;Bed to/from chair;Assistive device   PT: Gait Supervision/stand-by assist;100 feet;Rolling walker   PT: Stairs Supervision/stand-by assist;2 stairs;Rail on both sides   Interventions   Interventions Quick Adds Therapeutic Activity   Therapeutic Activity   Therapeutic Activities: dynamic activities to improve functional performance Minutes (72032) 30   Symptoms Noted During/After Treatment Fatigue   Treatment Detail/Skilled Intervention Patient greeted seated in recliner chair.  Patient agreeble to PT session.  Rest vitals recorded:  BP 89/47, HR 61, SpO2 99% on 1.5 L via NC.  Patient with edema in B legs, cued for ankle pumps and SLRs to promote circulation.  Patient also cued for LAQs in short sitting.  Weaned to 1 L O2 with SpO2 remaining >90%.  Patient performs x2 sit <> stands from chair with CGAx1, heavy UE support pushing from armrests to come to standing.  Patient cued to reach hands back for chair during stand > sit.  Patient able to maintain standing position ~10 minutes with B UE support on FWW.  Patient complete 3  sets x 20 marches and 8' forward and backward ambulation with CGAx2.  Patient standing void, dependent to hold urinal as requires B UE on walker to maintain balance.  Patient returning to chair at end of session, call light in reach and chair alarm activated.  Vitals recorded at end of session: BP remains soft at 82/38, HR 63, SpO2 96%.  LEs elevated on leg rest and pillows to promote distal fluid return.   PT Discharge Planning   PT Plan Progress gait distance with walker, repeated sit <> stands   PT Discharge Recommendation (DC Rec) Transitional Care Facility   PT Rationale for DC Rec Patient presents below his prior level of function, currently needing Ax1 for sit <> stands and Ax2 for <10' ambulation with rolling walker.  Patient lives with his son, who was assisting with some IADLs however patient was mod I for mobility.  There are 2 steps to enter home.  Recommend discharge to TCU to progress safety and independence with mobility prior to returning home.  If discharging home, would require Ax1-2 for ADLs and mobility and HH PT to promote return to PLOF.   PT Brief overview of current status CGAx1 STS. CGAx2 marching and limited gait with FWW   Total Session Time   Timed Code Treatment Minutes 30   Total Session Time (sum of timed and untimed services) 41

## 2023-05-30 NOTE — PHARMACY-ANTICOAGULATION SERVICE
Clinical Pharmacy - Warfarin Dosing Consult     Pharmacy has been consulted to manage this patient s warfarin therapy.  Indication: Atrial Fibrillation  Therapy Goal: INR 2-3  Warfarin Prior to Admission: Yes  Warfarin PTA Regimen: 4mg Monday, Wednesday, Friday and 6mg all other days  Significant drug interactions: fluconazole  Recent documented change in oral intake/nutrition: Yes  Dose Comments: liquid diet ordered now    INR   Date Value Ref Range Status   05/30/2023 2.96 (H) 0.85 - 1.15 Final   05/29/2023 2.94 (H) 0.85 - 1.15 Final       Recommend hold warfarin dose today given that INR is at the top of the goal range, pt on fluconazole and only on liquid diet for now.  Pharmacy will monitor Chacorta KENY Reji daily and order warfarin doses to achieve specified goal.      Please contact pharmacy as soon as possible if the warfarin needs to be held for a procedure or if the warfarin goals change.

## 2023-05-30 NOTE — CONSULTS
[facility] Nurse Inpatient Wound Assessment   Reason for consultation: Evaluate and treat  Multiple LE wounds    Assessment  Multiple lower leg wounds due to bumping his leg in the context of venous insufficiency  R) elbow skin tear   Abdominal skin fold Intertrigo  Status: initial assessment, stable    Treatment Plan  Right elbow and lower leg wounds every other day (even) and PRN  1. Cleanse wounds with NS or MicroKlenz #080018  2. Surrounding skin: Dry completely, then apply and protect with no sting barrier wipe #[id number] (allow to dry)  3. Cover with silicone foam dressing   4. Right elbow Skin tear and Left leg wounds:: Mepilex Lite # [id number]   5. Right leg wounds: Mepilex 4x4 #[id number]   6. sween 24 cream to dry skin on legs and heel    Skin Fold Management (Interdry Ag #[id number]) BID and PRN     1.   Remove fabric from skin fold for cleansing. Discard if soiled  2.   Cleanse skin with viola cleaning cloths of soft cloths and water (important not to use products that contain emollients with this fabric). Pat dry  3.   Cut fabric to size adding 2 extra inches to hang outside of the skin fold (for maximum moisture wicking)  4.   Place one edge of a single layer of fabric into the base of the fold allowing 2 inches of overhang    Pressure Injury prevention (please order supplies if not in room)  1. Turn every 2 hours, side to side avoid supine on pressure redistribution support surface   2. Apply sween 24 cream to dry skin on heels and legs   3. Float heels off bed with use of pillows under legs, if ineffective, obtain offloading boots: Heel Lift boots ( Prevalon #[id number])  4.  If incontinent, Cleanse with incontinent cleanser (e.g. Cathi spray # 911752) followed by skin barrier protectant (e.g. Critic Aid paste #33348)  twice daily AND after each incontinent episode  5.  Prevent sliding and shear by limiting HOB to 30 degrees or less unless contraindicated, use knee gatch first if not contraindicated  6.  If sitting, use  "pressure redistribution chair cushion (#913905); if unable to shift weight every hour, please limit sitting to an hour at a time  7. Optimize nutrition     Orders Written  Recommended provider order: Orthotic consult and home care  WOC Nurse follow-up plan:weekly  Nursing to notify the Provider(s) and re-consult the WOC Nurse if wound(s) deteriorates or new skin concern.    Patient History  According to provider note(s):  87M who presented initially to OSH on 5/28 with food impaction in his esophagus removed via endoscopy. It happened again 5/29/2023 He subsequently developed hypotension requiring  vasopressors and  transfer to the ICU.  For some reason the OSH was unable to repeat endoscopy at their site so he was transferred to [facility] for more definitive management. Per admitting physician at OSH \"I was able to locate [an EGD report] in the [facility] system done in March of this year that revealed grade D esophagitis but no stricture.  He cannot recall ever having a dilation.  He did not follow the antireflux regimen recommended at that time.\" After arrival to [facility]  patient underwent EGD with successful advancement of food embolus into the stomach. He was found to have an irregular Zline about 42 cm from the incisors.  No stricture.      Objective Data  Alert and oriented with daughters upon visit    Active Diet Order  Orders Placed This Encounter      Full Liquid Diet Thin Liquids (level 0) (NO STRAW, small bites/sips, alternate bites and sips, slow rate, small amounts at a sitting, stay up for 60+ min after eating, hold po if aspiration signs noted/respiratory status declines)    Kimball Catheter: Not present  Output:   I/O last 3 completed shifts:  In: 642.77 [I.V.:642.77]  Out: 800 [Urine:800]    Risk Assessment:   Sensory Perception: 3-->slightly limited  Moisture: 3-->occasionally moist  Activity: 2-->chairfast  Mobility: 2-->very limited  Nutrition: 2-->probably inadequate  Friction and Shear: " 2-->potential problem  Aly Score: 14                          Labs:   Recent Labs   Lab 05/30/23  0519 05/29/23  2204   ALBUMIN  --  3.2*   HGB 10.6* 10.1*   INR 2.96*  --    WBC 9.0 9.1       Physical Exam  Areas of skin assessed: focused legs, right elbow, abdominal skin folds, coccyx, sacrum buttocks perianal     5/30/2023 buttocks/upper thighs. Chronic intact skin discoloration   due to years of sleeping in recliner      5/30/2023 Right upper groin/lower abdominal skin fold Intertrigo    Wound Base: 100 % red dermis     Palpation of the wound bed: normal      Drainage: small     Description of drainage: serosanguinous     Measurements (length x width x depth, in cm) 0.25  x 6  x  <0.1 cm      Tunneling N/A     Undermining N/A  Periwound skin: intact      Color: normal and consistent with surrounding tissue      Temperature: normal   Odor: none  Pain: denies     5/30/2023 Right elbow, Partial thickness skin tear    Wound Base: 100 % red dermis     Palpation of the wound bed: normal      Drainage: small     Description of drainage: serosanguinous     Measurements (length x width x depth, in cm) 2  x 2  x  <0.1 cm      Tunneling N/A     Undermining N/A  Periwound skin: intact      Color: normal and consistent with surrounding tissue      Temperature: normal   Odor: none  Pain: tender with dressing removal    5/30/2023 R) anterior lower leg partial thickness wounds   due to bumping his leg in the context of venous insufficiency    Wound Base: 100 % red dermis     Palpation of the wound bed: normal      Drainage: small     Description of drainage: serosanguinous     Measurements (length x width x depth, in cm) Proximal 3  x 4  x  0.2 cm , Distal  4.5  x 3.8  x  0.2 cm      Tunneling N/A     Undermining N/A  Periwound skin: intact, hemosiderin staining, dry      Color: normal and consistent with surrounding tissue      Temperature: normal   Odor: none  Pain: denies  LE 1+ edema, + pedal pulses    5/30/2023 L)  lateral lower leg eschar due to bumping his leg   in the context of venous insufficiency    Wound Base: 100 % intact eschar     Palpation of the wound bed: normal      Drainage: none     Measurements (length x width x depth, in cm) 3 wounds 0.5-1cm    Tunneling N/A     Undermining N/A  Periwound skin: intact, hemosiderin staining, dry      Color: normal and consistent with surrounding tissue      Temperature: normal   Odor: none  Pain: denies  LE 1+ edema, + pedal pulses    5/30/2023 L) medial lower leg partial thickness wounds  due to bumping his leg in the context of venous insufficiency    Wound Base: 100 % red dermis     Palpation of the wound bed: normal      Drainage: small     Description of drainage: serosanguinous     Measurements (length x width x depth, in cm) 2 wounds 1.0-1.5 cm round  Tunneling N/A     Undermining N/A  Periwound skin: intact, hemosiderin staining, dry      Color: normal and consistent with surrounding tissue      Temperature: normal   Odor: none  Pain: denies  LE 1+ edema, + pedal pulses    Interventions  Visual inspection and assessment completed   Wound Care Rationale Protect periwound skin, Improve absorptive capacity, Promote moist wound healing without tissue dehydration , Provide selective debridement (autolysis) of nonviable tissue , Provide protection , Decrease bacterial load and Pain reduction to skin tear with use of a more gentle adhesive  Wound Care: done per plan of care  Supplies: floor stock  Current off-loading measures: Pillows  Current support surface: Standard  Low air loss mattress with pulsation   Education provided to: plan of care  Discussed plan of care with Patient, Family and Nurse      Kenzie Long MS RN LOL[facility] - '[facility] Nurse' ([place])   [facility] Office Phone: [phone]

## 2023-05-30 NOTE — PROGRESS NOTES
EGD finding consistent with dilated esophagus with mucosal erosion in the mid esophagus esophagus proximal middle and lower esophagus were moderately dilated possible Candida esophagitis.  Lower esophageal sphincter was open large amount of food was noticed in the esophagus and stomach food was washed down into the stomach no obstruction noticed.  I will recommend to start patient on full liquid diet.  Continue on Carafate 1 g 4 times a day liquid p.o.  Start on Diflucan 100 mg p.o. daily for 7 days.    Juan Alberto PAUL MD FACP  [facility]

## 2023-05-30 NOTE — PROGRESS NOTES
Transferred from [facility] around 2000, pt tolerated transfer. A/O x4, neuros intact. VSS on 2 L oxymask. Tele SR/V paced (100% paced at times). Low dose Levophed on to achieve MAP>65, very sensitive to titrations. Bedside EGD performed by Dr. Beck at beginning of shift, pt tolerated procedure/sedation well. No BM overnight, voiding spontaneously in urinal with assistance. Multiple skin issues including open wounds on bilateral legs, blanchable redness on coccyx, and skin tear on right upper arm. WOC consult placed. Plan for SLP to see today and eval/treat as indicated, pt currently NPO except for meds.     Updated Daughter, Esther, in AM over phone.

## 2023-05-30 NOTE — PLAN OF CARE
Goal Outcome Evaluation:      Plan of Care Reviewed With: patient, child  Pt tolerating full liquid diet, no sign/symptoms of difficulty when eating or afterward.  Reviewed need to take small bites, sips, and go slow, pt will need reminders.  Continues on low dose levophed, attempts to wean off not successful, moved pt out of bed to recliner chair, no change in blood pressure.  Family at bedside, spoke with care coordinator about concerns at home and potential needs for home health care.

## 2023-05-31 ENCOUNTER — APPOINTMENT (OUTPATIENT)
Dept: PHYSICAL THERAPY | Facility: CLINIC | Age: 88
DRG: 393 | End: 2023-05-31
Attending: INTERNAL MEDICINE
Payer: MEDICARE

## 2023-05-31 ENCOUNTER — APPOINTMENT (OUTPATIENT)
Dept: CARDIOLOGY | Facility: CLINIC | Age: 88
DRG: 393 | End: 2023-05-31
Attending: INTERNAL MEDICINE
Payer: MEDICARE

## 2023-05-31 ENCOUNTER — APPOINTMENT (OUTPATIENT)
Dept: SPEECH THERAPY | Facility: CLINIC | Age: 88
DRG: 393 | End: 2023-05-31
Attending: INTERNAL MEDICINE
Payer: MEDICARE

## 2023-05-31 ENCOUNTER — APPOINTMENT (OUTPATIENT)
Dept: GENERAL RADIOLOGY | Facility: CLINIC | Age: 88
DRG: 393 | End: 2023-05-31
Attending: INTERNAL MEDICINE
Payer: MEDICARE

## 2023-05-31 LAB
ANION GAP SERPL CALCULATED.3IONS-SCNC: 11 MMOL/L (ref 7–15)
BUN SERPL-MCNC: 36.3 MG/DL (ref 8–23)
CALCIUM SERPL-MCNC: 8.8 MG/DL (ref 8.8–10.2)
CHLORIDE SERPL-SCNC: 102 MMOL/L (ref 98–107)
CREAT SERPL-MCNC: 1.63 MG/DL (ref 0.67–1.17)
DEPRECATED HCO3 PLAS-SCNC: 24 MMOL/L (ref 22–29)
ERYTHROCYTE [DISTWIDTH] IN BLOOD BY AUTOMATED COUNT: 15.4 % (ref 10–15)
GFR SERPL CREATININE-BSD FRML MDRD: 41 ML/MIN/1.73M2
GLUCOSE SERPL-MCNC: 113 MG/DL (ref 70–99)
HCT VFR BLD AUTO: 31.4 % (ref 40–53)
HGB BLD-MCNC: 9.9 G/DL (ref 13.3–17.7)
INR PPP: 2.54 (ref 0.85–1.15)
LVEF ECHO: NORMAL
MAGNESIUM SERPL-MCNC: 1.9 MG/DL (ref 1.7–2.3)
MCH RBC QN AUTO: 31 PG (ref 26.5–33)
MCHC RBC AUTO-ENTMCNC: 31.5 G/DL (ref 31.5–36.5)
MCV RBC AUTO: 98 FL (ref 78–100)
PHOSPHATE SERPL-MCNC: 2 MG/DL (ref 2.5–4.5)
PLATELET # BLD AUTO: 117 10E3/UL (ref 150–450)
POTASSIUM SERPL-SCNC: 4 MMOL/L (ref 3.4–5.3)
RBC # BLD AUTO: 3.19 10E6/UL (ref 4.4–5.9)
SODIUM SERPL-SCNC: 137 MMOL/L (ref 136–145)
WBC # BLD AUTO: 8 10E3/UL (ref 4–11)

## 2023-05-31 PROCEDURE — 80048 BASIC METABOLIC PNL TOTAL CA: CPT | Performed by: INTERNAL MEDICINE

## 2023-05-31 PROCEDURE — 74230 X-RAY XM SWLNG FUNCJ C+: CPT

## 2023-05-31 PROCEDURE — 200N000001 HC R&B ICU

## 2023-05-31 PROCEDURE — 255N000002 HC RX 255 OP 636: Performed by: INTERNAL MEDICINE

## 2023-05-31 PROCEDURE — 999N000208 ECHOCARDIOGRAM COMPLETE

## 2023-05-31 PROCEDURE — 99233 SBSQ HOSP IP/OBS HIGH 50: CPT | Performed by: INTERNAL MEDICINE

## 2023-05-31 PROCEDURE — 97110 THERAPEUTIC EXERCISES: CPT | Mod: GP | Performed by: PHYSICAL THERAPIST

## 2023-05-31 PROCEDURE — 83735 ASSAY OF MAGNESIUM: CPT | Performed by: INTERNAL MEDICINE

## 2023-05-31 PROCEDURE — 93306 TTE W/DOPPLER COMPLETE: CPT | Mod: 26 | Performed by: INTERNAL MEDICINE

## 2023-05-31 PROCEDURE — 84100 ASSAY OF PHOSPHORUS: CPT | Performed by: INTERNAL MEDICINE

## 2023-05-31 PROCEDURE — 97116 GAIT TRAINING THERAPY: CPT | Mod: GP | Performed by: PHYSICAL THERAPIST

## 2023-05-31 PROCEDURE — 85610 PROTHROMBIN TIME: CPT

## 2023-05-31 PROCEDURE — 250N000011 HC RX IP 250 OP 636: Performed by: INTERNAL MEDICINE

## 2023-05-31 PROCEDURE — 258N000003 HC RX IP 258 OP 636: Performed by: INTERNAL MEDICINE

## 2023-05-31 PROCEDURE — 92526 ORAL FUNCTION THERAPY: CPT | Mod: GN | Performed by: SPEECH-LANGUAGE PATHOLOGIST

## 2023-05-31 PROCEDURE — 250N000013 HC RX MED GY IP 250 OP 250 PS 637: Performed by: INTERNAL MEDICINE

## 2023-05-31 PROCEDURE — 250N000009 HC RX 250: Performed by: INTERNAL MEDICINE

## 2023-05-31 PROCEDURE — 36415 COLL VENOUS BLD VENIPUNCTURE: CPT

## 2023-05-31 PROCEDURE — 92611 MOTION FLUOROSCOPY/SWALLOW: CPT | Mod: GN | Performed by: SPEECH-LANGUAGE PATHOLOGIST

## 2023-05-31 PROCEDURE — 85014 HEMATOCRIT: CPT | Performed by: INTERNAL MEDICINE

## 2023-05-31 RX ORDER — MULTIPLE VITAMINS W/ MINERALS TAB 9MG-400MCG
1 TAB ORAL DAILY
Status: DISCONTINUED | OUTPATIENT
Start: 2023-05-31 | End: 2023-06-07 | Stop reason: HOSPADM

## 2023-05-31 RX ORDER — ROPINIROLE 1 MG/1
1 TABLET, FILM COATED ORAL AT BEDTIME
Status: DISCONTINUED | OUTPATIENT
Start: 2023-05-31 | End: 2023-06-07 | Stop reason: HOSPADM

## 2023-05-31 RX ORDER — ALLOPURINOL 100 MG/1
100 TABLET ORAL DAILY
Status: DISCONTINUED | OUTPATIENT
Start: 2023-05-31 | End: 2023-06-07 | Stop reason: HOSPADM

## 2023-05-31 RX ORDER — BARIUM SULFATE 400 MG/ML
SUSPENSION ORAL ONCE
Status: COMPLETED | OUTPATIENT
Start: 2023-05-31 | End: 2023-05-31

## 2023-05-31 RX ORDER — ATORVASTATIN CALCIUM 10 MG/1
10 TABLET, FILM COATED ORAL EVERY EVENING
Status: DISCONTINUED | OUTPATIENT
Start: 2023-05-31 | End: 2023-06-07 | Stop reason: HOSPADM

## 2023-05-31 RX ORDER — MAGNESIUM SULFATE HEPTAHYDRATE 40 MG/ML
2 INJECTION, SOLUTION INTRAVENOUS ONCE
Status: COMPLETED | OUTPATIENT
Start: 2023-05-31 | End: 2023-05-31

## 2023-05-31 RX ORDER — MIDODRINE HYDROCHLORIDE 2.5 MG/1
2.5 TABLET ORAL
Status: DISCONTINUED | OUTPATIENT
Start: 2023-05-31 | End: 2023-06-02

## 2023-05-31 RX ORDER — GABAPENTIN 100 MG/1
100 CAPSULE ORAL DAILY
Status: DISCONTINUED | OUTPATIENT
Start: 2023-05-31 | End: 2023-06-07 | Stop reason: HOSPADM

## 2023-05-31 RX ORDER — WARFARIN SODIUM 2 MG/1
2 TABLET ORAL
Status: COMPLETED | OUTPATIENT
Start: 2023-05-31 | End: 2023-05-31

## 2023-05-31 RX ADMIN — PIPERACILLIN AND TAZOBACTAM 3.38 G: 3; .375 INJECTION, POWDER, FOR SOLUTION INTRAVENOUS at 20:48

## 2023-05-31 RX ADMIN — FLUCONAZOLE 100 MG: 40 POWDER, FOR SUSPENSION ORAL at 08:36

## 2023-05-31 RX ADMIN — SODIUM PHOSPHATE, MONOBASIC, MONOHYDRATE AND SODIUM PHOSPHATE, DIBASIC, ANHYDROUS 15 MMOL: 142; 276 INJECTION, SOLUTION INTRAVENOUS at 15:17

## 2023-05-31 RX ADMIN — PIPERACILLIN AND TAZOBACTAM 3.38 G: 3; .375 INJECTION, POWDER, FOR SOLUTION INTRAVENOUS at 14:13

## 2023-05-31 RX ADMIN — SUCRALFATE ORAL 1 G: 1 SUSPENSION ORAL at 06:09

## 2023-05-31 RX ADMIN — SUCRALFATE ORAL 1 G: 1 SUSPENSION ORAL at 21:00

## 2023-05-31 RX ADMIN — WARFARIN SODIUM 2 MG: 2 TABLET ORAL at 17:58

## 2023-05-31 RX ADMIN — GABAPENTIN 100 MG: 100 CAPSULE ORAL at 13:46

## 2023-05-31 RX ADMIN — DOXYCYCLINE 100 MG: 25 FOR SUSPENSION ORAL at 08:36

## 2023-05-31 RX ADMIN — HUMAN ALBUMIN MICROSPHERES AND PERFLUTREN 9 ML: 10; .22 INJECTION, SOLUTION INTRAVENOUS at 14:49

## 2023-05-31 RX ADMIN — DOXYCYCLINE 100 MG: 25 FOR SUSPENSION ORAL at 20:48

## 2023-05-31 RX ADMIN — MIDODRINE HYDROCHLORIDE 2.5 MG: 2.5 TABLET ORAL at 17:58

## 2023-05-31 RX ADMIN — SUCRALFATE ORAL 1 G: 1 SUSPENSION ORAL at 12:49

## 2023-05-31 RX ADMIN — LEVOTHYROXINE SODIUM 100 MCG: 100 TABLET ORAL at 06:10

## 2023-05-31 RX ADMIN — MAGNESIUM SULFATE HEPTAHYDRATE 2 G: 2 INJECTION, SOLUTION INTRAVENOUS at 22:11

## 2023-05-31 RX ADMIN — MULTIPLE VITAMINS W/ MINERALS TAB 1 TABLET: TAB at 13:52

## 2023-05-31 RX ADMIN — ROPINIROLE HYDROCHLORIDE 1 MG: 1 TABLET, FILM COATED ORAL at 21:00

## 2023-05-31 RX ADMIN — ALLOPURINOL 100 MG: 100 TABLET ORAL at 13:46

## 2023-05-31 RX ADMIN — BARIUM SULFATE 15 ML: 400 SUSPENSION ORAL at 09:13

## 2023-05-31 RX ADMIN — Medication 40 MG: at 06:09

## 2023-05-31 RX ADMIN — MIDODRINE HYDROCHLORIDE 2.5 MG: 2.5 TABLET ORAL at 12:54

## 2023-05-31 RX ADMIN — PIPERACILLIN AND TAZOBACTAM 3.38 G: 3; .375 INJECTION, POWDER, FOR SOLUTION INTRAVENOUS at 01:49

## 2023-05-31 RX ADMIN — ATORVASTATIN CALCIUM 10 MG: 10 TABLET, FILM COATED ORAL at 20:48

## 2023-05-31 RX ADMIN — SUCRALFATE ORAL 1 G: 1 SUSPENSION ORAL at 16:48

## 2023-05-31 RX ADMIN — PIPERACILLIN AND TAZOBACTAM 3.38 G: 3; .375 INJECTION, POWDER, FOR SOLUTION INTRAVENOUS at 08:31

## 2023-05-31 ASSESSMENT — ACTIVITIES OF DAILY LIVING (ADL)
ADLS_ACUITY_SCORE: 43
ADLS_ACUITY_SCORE: 46
ADLS_ACUITY_SCORE: 43
ADLS_ACUITY_SCORE: 45
ADLS_ACUITY_SCORE: 43
ADLS_ACUITY_SCORE: 45
ADLS_ACUITY_SCORE: 46
ADLS_ACUITY_SCORE: 43
ADLS_ACUITY_SCORE: 50
ADLS_ACUITY_SCORE: 43

## 2023-05-31 NOTE — PLAN OF CARE
Shift Summary [id number]    No significant changes this shift.  Still unable to liberate from levophed drip.  Able to wean to room air.  Tolerating full liquid diet.    Skin of buttocks seems to have gotten worse since woc assessed yesterday. Was bleeding when transferred back to bed from chair at start of shift. - chair cushion added and turned q2h and prn.    Plan: continue trying to wean of pressors. Full code.

## 2023-05-31 NOTE — PROGRESS NOTES
"Critical Care  Note      05/31/2023    Name: Chacorta Mendoza MRN#: [id number]   Age: 87 year old YOB: 1935     Hsptl Day# 2  ICU DAY # 1    MV DAY # 1             Problem List:   Shock, possibly septic  Food impaction in esophagus  Possible aspiration pneumonia  H/o CKD 4  H/o diastolic heart failure, a fib,aortic valve replacement, LBBB, h/o heart block s/p pacer  H/o DM2  H/o espohatitis         Summary/Hospital Course:   87M who presented initially to OSH on 5/28 with finding of food impaction in his esophagus.  This was removed via endoscopy however the next day he ate solid food again and his esophagus became re-impacted.  He subsequently developed hypotension requiring initiation of vasopressors and was transferred to the ICU.  For some reason the OSH was unable to repeat endoscopy at their site so he was transferred to [facility] for more definitive management.  Per the h and p of the admitting physician at the outside hospital:  \"I was able to locate [an EGD report] in the [facility] system done in March of this year that revealed grade D esophagitis but no stricture.  He cannot recall ever having a dilation.  He did not follow the antireflux regimen recommended at that time.\"    After arrival here, patient underwent EGD by Dr. Beck with successful advancement of food embolus into the stomach. He was found to have an irregular Zline about 42 cm from the incisors.  No stricture.  He continues on low dose levophed but this seems to be weaning off well.  He is in good spirits this morning and denies complaints.  He is excited to start on a full liquid diet.      Assessment and plan :     Chacorta Mendoza IS a 87 year old male admitted on 5/29 for shock and food impaction.    I have personally reviewed the daily labs, imaging studies, cultures and discussed the case with referring physician and consulting physicians.     My assessment and plan by system for this patient is as " follows:    Neurology/Psychiatry:   1. Pain/analgesia: prn dilaudid    Cardiovascular:   1.  Low blood pressures:  Etiology unclear and at this point he seems to be perfusing everything well (good UOP, mentating well), unclear why he still has a low pressor need.  LFT's ok.  Checking limited echo today.  Starting low dose midodrine; otherwise will continue on levophed as needed.  2.  H/o diastolic heart failure: no evidence for acute exacerbation at this time.  Restart home bumex, aldactone, Entresto when clinically appropriate  3.  H/o a fib:   INR 2.54, recheck daily. Will put in pharmacy warfarin consult to restart when appropriate.  4.  H/o HLD:  restart home atorva  5.  H/o aortic valve replacement:  Appears to have been a tavr in 2019 with bioprosthetic valve.    Pulmonary/Ventilator Management:   1. satting acceptably on RA to 2L.  CXR with mild b/l haziness; ?mild pulmonary edema? But no clinical manifestations.  2. Per OSH records he has a history of JAZMIN but does not generally adhere to cpap.    GI and Nutrition :   1. Impacted food embolus:  Appreciate [facility] involvement.  S/p EGD with dilated esophagus and possible candida esophagitis; large amount of food noted but no evidence of eufemia obstruction.  SLP, full liquid diet.  2.  H/o esophagitis:  Cont protonix and sucralfate  3. ? Candida esophagitis.  Diflucan x 7d.    Renal/Fluids/Electrolytes:   1. H/o ckd 4:  Monitor creatinine and UOP.  Creat 1.63 this AM.  Creat at baseline appears to be between 1.5 and 2.0.   2.  Hypophos to 2.0.  Replete.    Infectious Disease:   1. Possible aspiration pneumonia:  continue zosyn therapy empirically (5/29-).  Consider stopping vs abbreviated course pending resolution of shock and repeat CXR.   2.  Notified that patient had a tick removed from his left forearm a few days ago and on exam he does appear to have a target lesion at the site.  No other signs/sxs of lyme disease.  Will empirically treat for early  "localized Lyme disease:  Doxycycline  mg, 10 day course.    Endocrine:   1. H/o hypothyroidism:  home synthroid  2.  H/o DM2:  Insulin therapy as needed.     Hematology/Oncology:   1. Wbc 8.0 ok  2.  hgb 9.9 -- anemia likely 2/2 ckd. No apparent blood loss  3.  Thrombocytopenia to 117 slowly uptrending      ICU Prophylaxis:   1. DVT: SCDs, home warfarin therapy  2. Feeding - SLP, full liquid per GI.  3. Family Update: pt himself at bedside  4. Disposition - icu    Clinically Significant Risk Factors              # Hypoalbuminemia: Lowest albumin = 3.2 g/dL at 5/29/2023 10:04 PM, will monitor as appropriate  # Coagulation Defect: INR = 2.54 (Ref range: 0.85 - 1.15) and/or PTT = N/A, will monitor for bleeding  # Thrombocytopenia: Lowest platelets = 109 in last 2 days, will monitor for bleeding   # Hypertension: Noted on problem list        # Severe Obesity: Estimated body mass index is 41.3 kg/m  as calculated from the following:    Height as of 5/28/23: 1.854 m (6' 1\").    Weight as of this encounter: 142 kg (313 lb 0.9 oz)., PRESENT ON ADMISSION                       Physical Examination:   Temp:  [97.8  F (36.6  C)-97.9  F (36.6  C)] 97.8  F (36.6  C)  Pulse:  [58-70] 66  Resp:  [0-25] 16  BP: ()/(37-77) 118/50  SpO2:  [88 %-100 %] 96 %  No intake or output data in the 24 hours ending 05/29/23 1654  Wt Readings from Last 4 Encounters:   05/30/23 142 kg (313 lb 0.9 oz)   05/29/23 142.7 kg (314 lb 9.5 oz)     BP - Mean:  [] 74  Resp: 16    No lab results found in last 7 days.    GEN: no acute distress.  Awake and alert.  HEENT: head ncat, sclera anicteric, OP patent, trachea midline   PULM: unlabored synchronous, clear anteriorly    CV/COR: RRR S1S2  ABD: soft nontender, hypoactive bowel sounds, no mass  EXT:  warm and well perfused x4  NEURO: PERRL, no obvious deficits  SKIN: no obvious rash, lesion which could be c/w erythema migrans of L forearm at site of tick bite last week  LINES: clean, dry " intact         Data:   All data and imaging reviewed     ROUTINE ICU LABS (Last four results)  CMP  Recent Labs   Lab 05/31/23  0533 05/30/23  0519 05/29/23 2204 05/29/23  1950 05/29/23  1615 05/29/23 0520 05/28/23  1800 05/28/23  0709    139 138  --   --  142  --  143   POTASSIUM 4.0 3.5 3.4  --   --  3.4  --  3.5   CHLORIDE 102 102 101  --   --  102  --  101   CO2 24 25 25  --   --  27  --  27   ANIONGAP 11 12 12  --   --  13  --  15   * 112* 115* 100*   < > 169*   < > 143*   BUN 36.3* 41.9* 44.6*  --   --  43.0*  --  48.7*   CR 1.63* 1.82* 1.86*  --   --  1.69*  --  1.92*   GFRESTIMATED 41* 36* 35*  --   --  39*  --  34*   MATTHEW 8.8 8.8 8.6*  --   --  9.1  --  9.7   MAG 1.9 2.0 1.9  --   --   --   --   --    PHOS 2.0* 2.6 2.8  --   --   --   --   --    PROTTOTAL  --   --  6.5  --   --   --   --  7.7   ALBUMIN  --   --  3.2*  --   --   --   --  4.0   BILITOTAL  --   --  0.7  --   --   --   --  0.8   ALKPHOS  --   --  80  --   --   --   --  85   AST  --   --  20  --   --   --   --  19   ALT  --   --  12  --   --   --   --  11    < > = values in this interval not displayed.     CBC  Recent Labs   Lab 05/31/23 0533 05/30/23 0519 05/29/23 2204 05/29/23 0520   WBC 8.0 9.0 9.1 8.2   RBC 3.19* 3.38* 3.22* 3.13*   HGB 9.9* 10.6* 10.1* 9.9*   HCT 31.4* 33.6* 31.9* 31.0*   MCV 98 99 99 99   MCH 31.0 31.4 31.4 31.6   MCHC 31.5 31.5 31.7 31.9   RDW 15.4* 15.4* 15.4* 15.5*   * 116* 109* 112*     INR  Recent Labs   Lab 05/31/23  0533 05/30/23  0519 05/29/23  0520 05/28/23  0709   INR 2.54* 2.96* 2.94* 2.30*     Arterial Blood GasNo lab results found in last 7 days.    All cultures:  No results for input(s): CULT in the last 168 hours.  Recent Results (from the past 24 hour(s))   XR Chest Port 1 View    Narrative    EXAM: XR CHEST PORT 1 VIEW  LOCATION: [facility]  DATE/TIME: 5/29/2023 9:06 AM CDT    INDICATION: Chronic heart failure.  COMPARISON: Chest radiograph 05/28/2023.       Impression    IMPRESSION:    Cardiomegaly and mild interstitial edema have not significant changed since yesterday's exam. Patchy airspace opacities at the lung bases have improved and may reflect improving atelectasis. Left lower lung calcified granuloma is unchanged. No pleural   effusions. No pneumothorax. Atherosclerotic aortic arch calcifications.              Past Medical/surgical hx, meds, allergies, social history, family history   No past medical history on file.  Past Surgical History:   Procedure Laterality Date     ESOPHAGOSCOPY, GASTROSCOPY, DUODENOSCOPY (EGD), COMBINED N/A 5/28/2023    Procedure: ESOPHAGOGASTRODUODENOSCOPY, WITH FOREIGN BODY REMOVAL;  Surgeon: John Carrizales MD;  Location: [facility] OR     ESOPHAGOSCOPY, GASTROSCOPY, DUODENOSCOPY (EGD), COMBINED N/A 5/29/2023    Procedure: Esophagoscopy, gastroscopy, duodenoscopy (EGD), combined;  Surgeon: Juan Alberto Beck MD;  Location: [facility]     No current facility-administered medications on file prior to encounter.  acetaminophen (TYLENOL) 500 MG tablet, Take 500 mg by mouth daily  allopurinol (ZYLOPRIM) 100 MG tablet, Take 100 mg by mouth daily  atorvastatin (LIPITOR) 10 MG tablet, Take 10 mg by mouth daily  bumetanide (BUMEX) 2 MG tablet, Take 2 mg by mouth 2 times daily 2 tabs at 0800 and 1 tab at 1300  calcitRIOL (ROCALTROL) 0.25 MCG capsule, Take 0.25 mcg by mouth daily  ferrous sulfate (FEROSUL) 325 (65 Fe) MG tablet, Take 325 mg by mouth daily (with breakfast)  gabapentin (NEURONTIN) 100 MG capsule, Take 100 mg by mouth daily  levothyroxine (SYNTHROID/LEVOTHROID) 100 MCG tablet, Take 100 mcg by mouth daily  Multiple Vitamin (MULTIVITAMIN ADULT) TABS, Take 1 tablet by mouth daily  pantoprazole (PROTONIX) 40 MG EC tablet, Take 40 mg by mouth 2 times daily (before meals)  potassium chloride ER (KLOR-CON M) 10 MEQ CR tablet, Take 2 tablets by mouth daily with food  rOPINIRole (REQUIP) 1 MG tablet, Take 1 mg by mouth At  Bedtime  sacubitril-valsartan (ENTRESTO) 24-26 MG per tablet, Take 0.5 tablets by mouth 2 times daily  spironolactone (ALDACTONE) 25 MG tablet, Take 0.5 mg by mouth daily  sucralfate (CARAFATE) 1 GM tablet, Take 1 g by mouth 2 times daily (with meals)  urea (CARMOL) 10 % external cream, Apply 1 Application topically as needed  warfarin ANTICOAGULANT (COUMADIN) 4 MG tablet, Take 4 mg by mouth daily 4 mg Monday, Wednesday, Friday and 6 mg the other 4 days  OTHER MEDICAL SUPPLIES, by Other route See Admin Instructions Diabetic shoe         Allergies   Allergen Reactions     Levofloxacin Muscle Pain (Myalgia) and Other (See Comments)     Other reaction(s): Myalgias  Muscle Pain  Muscle Pain  Muscle Pain       Lisinopril Other (See Comments)     Changed to ARB due to rising creatinine  Changed to ARB due to rising creatinine  Changed to ARB due to rising creatinine       Social History     Socioeconomic History     Marital status:      Spouse name: Not on file     Number of children: Not on file     Years of education: Not on file     Highest education level: Not on file   Occupational History     Not on file   Tobacco Use     Smoking status: Not on file     Smokeless tobacco: Not on file   Vaping Use     Vaping status: Not on file   Substance and Sexual Activity     Alcohol use: Not on file     Drug use: Not on file     Sexual activity: Not on file   Other Topics Concern     Not on file   Social History Narrative     Not on file     Social Determinants of Health     Financial Resource Strain: Not on file   Food Insecurity: Not on file   Transportation Needs: Not on file   Physical Activity: Not on file   Stress: Not on file   Social Connections: Not on file   Intimate Partner Violence: Not on file   Housing Stability: Not on file     No family history on file.

## 2023-05-31 NOTE — PROGRESS NOTES
Care Management Follow Up    Length of Stay (days): 2    Expected Discharge Date: 06/02/2023     Concerns to be Addressed:       Patient plan of care discussed at interdisciplinary rounds: Yes    Anticipated Discharge Disposition: Home Care, Transitional Care     Anticipated Discharge Services:    Anticipated Discharge DME:      Patient/family educated on Medicare website which has current facility and service quality ratings: yes  Education Provided on the Discharge Plan:    Patient/Family in Agreement with the Plan: yes    Referrals Placed by CM/SW: Homecare, [facility], Community Resources  Private pay costs discussed: Not applicable    Additional Information:  Following for discharge planning.  See recommendation for TCU.     CC went and discussed TCU with patient.  He would still really like to go home.   He states he will discuss with daughters.  TCU list left with patient.    SW/CC to follow for discharge planning to home vs TCU.       Mireille Lemus RN

## 2023-05-31 NOTE — PLAN OF CARE
Goal Outcome Evaluation:        Neuro: A&Ox4. Neuros intact.    Fever/Pain: Afebrile. Denies pain.   CV: A-fib with CVR, occasionally Vpaced, MAP maintained with low dose levophed. Drip weaned off this afternoon, patient currently tolerating levo drip off, midodrine started.   Pulm: Lung sounds clear.  SpO2 stable on RA.    : Voiding per urinal. Good urine output.  Magnesium/Phosphate replaced x1, recheck due in AM.   GI:  Tolerating full liquid diet. No BM this shift.     Skin: . Pressure injury (chronic per WOC) on coccyx/buttocks, WOC consult in place. Interdry in folds.  BLE dressings intact.   Plan: Continue to monitor BP and maintain parameters. Family at bedside updated on POC.

## 2023-05-31 NOTE — DISCHARGE INSTRUCTIONS
Right elbow and lower leg wounds every other day (even) and PRN  Cleanse wounds with NS or wound cleanser  Surrounding skin: Dry completely, then apply and protect with no sting barrier wipe  (allow to dry)  Cover with silicone foam dressings  Right elbow Skin tear and Left leg wounds:eg Mepilex Lite   Right leg wounds: eg Mepilex 4x4   Daily Quality moisturizing cream (eg Sween 24) to dry skin on legs and heels     Abdominal and groin Skin Fold Management (Interdry Ag) as needed PRN     1.   Remove fabric from skin fold for cleansing. Discard if soiled  2.   Cleanse skin with viola cleaning cloths of soft cloths and water (important not to use products that contain emollients with this fabric). Pat dry  3.   Cut fabric to size adding 2 extra inches to hang outside of the skin fold (for maximum moisture wicking)  4.   Place one edge of a single layer of fabric into the base of the fold allowing 2 inches of overhang     Pressure Injury prevention   Turn every 2 hours, side to side avoid supine on pressure redistribution support surface   Apply sween 24 cream to dry skin on heels and legs   Float heels off bed with use of pillows under legs, if ineffective, obtain offloading boots: Heel Lift boots   If incontinent, Cleanse with incontinent cleanser (e.g. Cathi spray ) followed by skin barrier protectant (e.g. Critic Aid paste)  twice daily AND after each incontinent episode   Prevent sliding and shear by limiting HOB to 30 degrees or less unless contraindicated, use knee gatch first if not contraindicated   If sitting, use pressure redistribution chair cushion; if unable to shift weight every hour, please limit sitting to an hour at a time  Optimize nutrition

## 2023-06-01 ENCOUNTER — APPOINTMENT (OUTPATIENT)
Dept: SPEECH THERAPY | Facility: CLINIC | Age: 88
DRG: 393 | End: 2023-06-01
Attending: INTERNAL MEDICINE
Payer: MEDICARE

## 2023-06-01 ENCOUNTER — APPOINTMENT (OUTPATIENT)
Dept: PHYSICAL THERAPY | Facility: CLINIC | Age: 88
DRG: 393 | End: 2023-06-01
Attending: INTERNAL MEDICINE
Payer: MEDICARE

## 2023-06-01 LAB
ANION GAP SERPL CALCULATED.3IONS-SCNC: 8 MMOL/L (ref 7–15)
BUN SERPL-MCNC: 31.4 MG/DL (ref 8–23)
CALCIUM SERPL-MCNC: 8.6 MG/DL (ref 8.8–10.2)
CHLORIDE SERPL-SCNC: 102 MMOL/L (ref 98–107)
CREAT SERPL-MCNC: 1.46 MG/DL (ref 0.67–1.17)
DEPRECATED HCO3 PLAS-SCNC: 27 MMOL/L (ref 22–29)
ERYTHROCYTE [DISTWIDTH] IN BLOOD BY AUTOMATED COUNT: 14.9 % (ref 10–15)
GFR SERPL CREATININE-BSD FRML MDRD: 47 ML/MIN/1.73M2
GLUCOSE SERPL-MCNC: 112 MG/DL (ref 70–99)
HCT VFR BLD AUTO: 27.7 % (ref 40–53)
HGB BLD-MCNC: 8.9 G/DL (ref 13.3–17.7)
INR PPP: 2.39 (ref 0.85–1.15)
MAGNESIUM SERPL-MCNC: 2.1 MG/DL (ref 1.7–2.3)
MCH RBC QN AUTO: 31.3 PG (ref 26.5–33)
MCHC RBC AUTO-ENTMCNC: 32.1 G/DL (ref 31.5–36.5)
MCV RBC AUTO: 98 FL (ref 78–100)
PHOSPHATE SERPL-MCNC: 2.4 MG/DL (ref 2.5–4.5)
PLATELET # BLD AUTO: 98 10E3/UL (ref 150–450)
POTASSIUM SERPL-SCNC: 4.2 MMOL/L (ref 3.4–5.3)
RBC # BLD AUTO: 2.84 10E6/UL (ref 4.4–5.9)
SODIUM SERPL-SCNC: 137 MMOL/L (ref 136–145)
WBC # BLD AUTO: 5.4 10E3/UL (ref 4–11)

## 2023-06-01 PROCEDURE — 250N000013 HC RX MED GY IP 250 OP 250 PS 637: Performed by: INTERNAL MEDICINE

## 2023-06-01 PROCEDURE — 99233 SBSQ HOSP IP/OBS HIGH 50: CPT | Performed by: INTERNAL MEDICINE

## 2023-06-01 PROCEDURE — 85610 PROTHROMBIN TIME: CPT

## 2023-06-01 PROCEDURE — 85027 COMPLETE CBC AUTOMATED: CPT | Performed by: INTERNAL MEDICINE

## 2023-06-01 PROCEDURE — 97530 THERAPEUTIC ACTIVITIES: CPT | Mod: GP | Performed by: PHYSICAL THERAPIST

## 2023-06-01 PROCEDURE — 36415 COLL VENOUS BLD VENIPUNCTURE: CPT | Performed by: INTERNAL MEDICINE

## 2023-06-01 PROCEDURE — 84100 ASSAY OF PHOSPHORUS: CPT | Performed by: INTERNAL MEDICINE

## 2023-06-01 PROCEDURE — 250N000011 HC RX IP 250 OP 636: Performed by: INTERNAL MEDICINE

## 2023-06-01 PROCEDURE — 92526 ORAL FUNCTION THERAPY: CPT | Mod: GN

## 2023-06-01 PROCEDURE — 97116 GAIT TRAINING THERAPY: CPT | Mod: GP | Performed by: PHYSICAL THERAPIST

## 2023-06-01 PROCEDURE — 80048 BASIC METABOLIC PNL TOTAL CA: CPT | Performed by: INTERNAL MEDICINE

## 2023-06-01 PROCEDURE — 83735 ASSAY OF MAGNESIUM: CPT | Performed by: INTERNAL MEDICINE

## 2023-06-01 PROCEDURE — 120N000001 HC R&B MED SURG/OB

## 2023-06-01 PROCEDURE — 99222 1ST HOSP IP/OBS MODERATE 55: CPT | Performed by: INTERNAL MEDICINE

## 2023-06-01 RX ORDER — CEFTRIAXONE 1 G/1
1 INJECTION, POWDER, FOR SOLUTION INTRAMUSCULAR; INTRAVENOUS EVERY 24 HOURS
Status: COMPLETED | OUTPATIENT
Start: 2023-06-01 | End: 2023-06-04

## 2023-06-01 RX ADMIN — WARFARIN SODIUM 3 MG: 3 TABLET ORAL at 18:21

## 2023-06-01 RX ADMIN — MIDODRINE HYDROCHLORIDE 2.5 MG: 2.5 TABLET ORAL at 13:22

## 2023-06-01 RX ADMIN — SUCRALFATE ORAL 1 G: 1 SUSPENSION ORAL at 11:01

## 2023-06-01 RX ADMIN — SUCRALFATE ORAL 1 G: 1 SUSPENSION ORAL at 07:00

## 2023-06-01 RX ADMIN — FLUCONAZOLE 100 MG: 40 POWDER, FOR SUSPENSION ORAL at 08:39

## 2023-06-01 RX ADMIN — DOXYCYCLINE 100 MG: 25 FOR SUSPENSION ORAL at 22:03

## 2023-06-01 RX ADMIN — CEFTRIAXONE SODIUM 1 G: 1 INJECTION, POWDER, FOR SOLUTION INTRAMUSCULAR; INTRAVENOUS at 11:01

## 2023-06-01 RX ADMIN — LEVOTHYROXINE SODIUM 100 MCG: 100 TABLET ORAL at 07:00

## 2023-06-01 RX ADMIN — POTASSIUM & SODIUM PHOSPHATES POWDER PACK 280-160-250 MG 1 PACKET: 280-160-250 PACK at 06:59

## 2023-06-01 RX ADMIN — POTASSIUM & SODIUM PHOSPHATES POWDER PACK 280-160-250 MG 1 PACKET: 280-160-250 PACK at 15:25

## 2023-06-01 RX ADMIN — ROPINIROLE HYDROCHLORIDE 1 MG: 1 TABLET, FILM COATED ORAL at 21:51

## 2023-06-01 RX ADMIN — Medication 40 MG: at 07:00

## 2023-06-01 RX ADMIN — MIDODRINE HYDROCHLORIDE 2.5 MG: 2.5 TABLET ORAL at 08:39

## 2023-06-01 RX ADMIN — MIDODRINE HYDROCHLORIDE 2.5 MG: 2.5 TABLET ORAL at 18:59

## 2023-06-01 RX ADMIN — SUCRALFATE ORAL 1 G: 1 SUSPENSION ORAL at 21:53

## 2023-06-01 RX ADMIN — GABAPENTIN 100 MG: 100 CAPSULE ORAL at 08:38

## 2023-06-01 RX ADMIN — MULTIPLE VITAMINS W/ MINERALS TAB 1 TABLET: TAB at 13:22

## 2023-06-01 RX ADMIN — ATORVASTATIN CALCIUM 10 MG: 10 TABLET, FILM COATED ORAL at 21:52

## 2023-06-01 RX ADMIN — PIPERACILLIN AND TAZOBACTAM 3.38 G: 3; .375 INJECTION, POWDER, FOR SOLUTION INTRAVENOUS at 04:58

## 2023-06-01 RX ADMIN — ALLOPURINOL 100 MG: 100 TABLET ORAL at 08:39

## 2023-06-01 RX ADMIN — DOXYCYCLINE 100 MG: 25 FOR SUSPENSION ORAL at 08:39

## 2023-06-01 RX ADMIN — SUCRALFATE ORAL 1 G: 1 SUSPENSION ORAL at 16:05

## 2023-06-01 RX ADMIN — POTASSIUM & SODIUM PHOSPHATES POWDER PACK 280-160-250 MG 1 PACKET: 280-160-250 PACK at 11:01

## 2023-06-01 ASSESSMENT — ACTIVITIES OF DAILY LIVING (ADL)
ADLS_ACUITY_SCORE: 43
ADLS_ACUITY_SCORE: 45
ADLS_ACUITY_SCORE: 45
ADLS_ACUITY_SCORE: 43
ADLS_ACUITY_SCORE: 43
ADLS_ACUITY_SCORE: 45
ADLS_ACUITY_SCORE: 43
ADLS_ACUITY_SCORE: 45
ADLS_ACUITY_SCORE: 43

## 2023-06-01 NOTE — PLAN OF CARE
Goal Outcome Evaluation:         Neuro: A&Ox4. Neuros intact.    Fever/Pain: Afebrile. Denies pain.   CV: A-fib with CVR, occasionally Vpaced.  Frequent PVCs. Stable BPs with Midodrine..   Pulm: Lung sounds clear.  SpO2 stable on RA.    : Voiding per urinal. Adequate/Good urine output.  Phos replaced, recheck due in AM.   GI: Pt advanced to Level 6 diet, good appetite.  Stooling regularly.    Skin:  Chronic ressure injury on coccyx/buttocks, WOC following, mepilex. Pt also has skin tears/wounds on RUE and BLE,  wound cares/dressing changes  Performed.   Plan: Transfer to floor when bed available. Family at bedside updated on POC.

## 2023-06-01 NOTE — CONSULTS
"[facility]  Consult Note - Hospitalist Service  Date of Admission:  5/29/2023  Consult Requested by: Hakeem Lopez MD  Reason for Consult: resume patient care on the floor    Assessment & Plan   Chacorta Mendoza is a 87 year old male admitted on 5/29/2023.  Hospital Summary as per CC:    87M who presented initially to OSH on 5/28 with finding of food impaction in his esophagus.  This was removed via endoscopy however the next day he ate solid food again and his esophagus became re-impacted.  He subsequently developed hypotension requiring initiation of vasopressors and was transferred to the ICU.  For some reason the OSH was unable to repeat endoscopy at their site so he was transferred to [facility] for more definitive management.  Per the h and p of the admitting physician at the outside hospital:  \"I was able to locate [an EGD report] in the [employer] system done in March of this year that revealed grade D esophagitis but no stricture.  He cannot recall ever having a dilation.  He did not follow the antireflux regimen recommended at that time.\"     After arrival here, patient underwent EGD by Dr. Beck with successful advancement of food embolus into the stomach. He was found to have an irregular Zline about 42 cm from the incisors.  No stricture.  He never required more than low dose pressors and was started on low dose midodrine with good response.  On 6/1 dietary and GI cleared his diet to advance to \"small and bite sized\" diet.     This morning he remains in good spirits and denies complaints.  He is excited to have his diet advanced.    Case discussed with Dr Lopez personally.  His blood pressure improving but not at baseline.  He is off pressors, he is on midodrine 2.5 mg TID.  He has not received any of his chronic HF medications (bumex, entresto and aldactone )  Echo reviewed.  Cr likely at baseline.   Videoswallow reviewed    Severe Grade LA D esophagitis  Chronic esophageal " "dysmotility / achalasia (no evidence of stricture)  --  Impacted food embolus:  Appreciate [facility] involvement.  S/p EGD with dilated esophagus and possible candida esophagitis; large amount of food noted but no evidence of eufemia obstruction.  Overall Dr. Beck feels this is likely a motility problem; no stricture. Recommends sitting bolt upright while eating and eating slowly.  Continue SLP.  Advanced diet to \"small bite sized\". Meal time no later than 6 PM  -  H/o esophagitis (noted from EGD 3/23):  Cont protonix and sucralfate  -  Possible Candida esophagitis.  Diflucan x 7d.  - had videoswallow and showed thin liquid penetration, speech      Possible Aspiration pneumonia  Possible low grade sepsis   -- currently on RA.  CXR with mild b/l haziness; ?mild pulmonary edema? But no clinical manifestations.  -- videoswallow shows thin liquid penetration, suspect he had some aspiration   -- OSH records he has a history of JAZMIN but does not generally adhere to cpap, has been doing ok here at night off of cpap--occasionally needs nasal cannula 2L.  --  Possible aspiration pneumonia: narrow zosyn to CTX today (abx dates 5/29-).  Would continue abx for a total of 7d course but could switch to PO on discharge.  --  Notified that patient had a tick removed from his left forearm a few days ago and on exam he does appear to have a target lesion at the site.  No other signs/sxs of lyme disease.  Was empirically treat for early localized Lyme disease:  Doxycycline  mg, 10 day course.    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 -- Everything seems well perfused (good UOP, mentating well), and it's not entirely clear why he needed pressors to begin with (no other evidence of sepsis, echo with EF 35-40% but this is baseline and his tavr valve has only mildly increased gradients).  LFT's ok.    Now off " "levophed.  --  H/o diastolic and systolic heart failure: EF remains 35-40% which is his baseline.  no evidence for acute exacerbation at this time.    -- hold home bumex, aldactone, Entresto, wean midodrine off  --  INR 2.39, recheck daily. Warfarin therapy being managed by pharmacist  -- continue on home atorva    CKD stage IV:   -- Creat 1.46 this AM.  Creat at baseline appears to be between 1.5 and 2.0.   -- to 2.4.  Replete.       Hypothyroidism  DM II  -- continue  home synthroid  -- BG under good control     Anemia - multifactorial  Thrombocytopenia  -- likely component of CKD  --  hgb 8.9 -- anemia likely 2/2 ckd. No apparent blood loss  -- PLT's low chronically from outside records       DVT: SCDs, home warfarin therapy    Disposition  -- start PT/OT/ speech  -- home vs TCU         Clinically Significant Risk Factors              # Hypoalbuminemia: Lowest albumin = 3.2 g/dL at 5/29/2023 10:04 PM, will monitor as appropriate   # Thrombocytopenia: Lowest platelets = 98 in last 2 days, will monitor for bleeding   # Hypertension: Noted on problem list  # Chronic heart failure with reduced ejection fraction: last echo with EF <40%       # Severe Obesity: Estimated body mass index is 43.69 kg/m  as calculated from the following:    Height as of 5/28/23: 1.854 m (6' 1\").    Weight as of this encounter: 150.2 kg (331 lb 2.1 oz)., PRESENT ON ADMISSION          Ritchie Laurent MD  Hospitalist Service  Securely message with [email or web address] (more info)  Text page via [employer] Paging/Directory   ______________________________________________________________________    Chief Complaint   Food impaction followed by persistent hypotension requiring low dose pressors in the ICU    History is obtained from the patient, ER MD, EHR    History of Present Illness     87M who presented initially to OSH on 5/28 with finding of food impaction in his esophagus.  This was removed via endoscopy however the next day he ate solid food again and " "his esophagus became re-impacted.  He subsequently developed hypotension requiring initiation of vasopressors and was transferred to the ICU.  For some reason the OSH was unable to repeat endoscopy at their site so he was transferred to [facility] for more definitive management.  Per the h and p of the admitting physician at the outside hospital:  \"I was able to locate [an EGD report] in the [employer] system done in March of this year that revealed grade D esophagitis but no stricture.  He cannot recall ever having a dilation.  He did not follow the antireflux regimen recommended at that time.\"     After arrival here, patient underwent EGD by Dr. Beck with successful advancement of food embolus into the stomach. He was found to have an irregular Zline about 42 cm from the incisors.  No stricture.  He never required more than low dose pressors and was started on low dose midodrine with good response.  On 6/1 dietary and GI cleared his diet to advance to \"small and bite sized\" diet.     This morning he remains in good spirits and denies complaints.  He is excited to have his diet advanced.        Past Medical History      Problem Noted Date Diagnosed Date   Gastric outlet obstruction 03/20/2023     Chronic kidney disease (CKD), stage IV (severe) 03/20/2023     CHFr EF  10/09/2022     ACP (advance care planning) 04/30/2022     Overview:     Formatting of this note might be different from the original.  SIC Conversation 3/18/2022    DNR /DNI     IRON DEFICIENCY ANEMIA. 02/01/2022     Overview:     Formatting of this note might be different from the original.  Dr Parisa Vargas  Anemia of ckd and iron def  consider EPO growth factor support if hemoglobin is below 10.   2/ 2022 IRON INFUSION.   PANCYTOPENIA. 08/31/2021     Overview:     Formatting of this note might be different from the original.  Dr Parisa Vargas  Anemia of ckd and iron def  Low platelets can be secondary to consumption    Bone marrow biopsy on 11/29/2021 " revealed hypercellular bone marrow with appropriate trilineage hematopoiesis, no evidence of leukemia and decreased iron stores cytogenetics normal  Hepatitis profile -ve ,B12 and folic acid level normal ,Ferritin at 52 low    - consider EPO growth factor support if hemoglobin is below 10. at this point hb 10.5  -on iv iron venofer     Current use of long term anticoagulation 04/14/2021     S/P PACEMAKER. 01/09/2020     Overview:     Formatting of this note might be different from the original.  Date of last device in office evaluation: 2/2/2023 Following clinic: [facility]    LBBB (left bundle branch block) 11/10/2019     Overview:     Formatting of this note might be different from the original.  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  2015 MRI L5 vertebroplasty . At the L4-5 level, . There is moderate central canal narrowing.   4/2021 CT lumbar  Progressive central canal stenosis L3-L4 which is moderate to moderately severe.  Mild canal stenosis L2-L3 is stable with mild to moderate canal stenosis L4-L5, also stable.  4/2021 S/P epidural          HYPERTENSION. 03/24/2019     DIABETES MELLITUS TYPE 2. 03/03/2018     Overview:     Formatting of this note might be different from the original.  Chronic kidney disease    GOUT. 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  Dr Perrin  9/25/2020 Severe aortic stenosis - status post TAVR   Pericardial effusion post-TAVR, status post pericardiocentesis    Restless leg syndrome 05/15/2016     CHRONIC KIDNEY DISEASE- stage 3b 08/13/2013     Overview:     Formatting of this note might be different from the original.  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   Morbid exogenous obesity 06/22/2012     Overview:      Formatting of this note might be different from the original.     JAZMIN (obstructive sleep apnea) 05/17/2011     Overview:     Formatting of this note might be different from the original.  Does not use CPAP   HYPERCHOLESTEROL. 01/05/2011     Overview:     Formatting of this note might be different from the original.     ATRIAL FIBRILLATION. 05/04/2007     Overview:     Formatting of this note might be different from the original.  Cardioverted- failed. Converted in higher dose amiodarone.    HYPOTHYROID. 03/28/2007     Overview:     Formatting of this note might be different from the original.  Amiodarone related  Negative thyroid autoantibodies         Past Surgical History   Past Surgical History:   Procedure Laterality Date     ESOPHAGOSCOPY, GASTROSCOPY, DUODENOSCOPY (EGD), COMBINED N/A 5/28/2023    Procedure: ESOPHAGOGASTRODUODENOSCOPY, WITH FOREIGN BODY REMOVAL;  Surgeon: John Carrizales MD;  [facility]     ESOPHAGOSCOPY, GASTROSCOPY, DUODENOSCOPY (EGD), COMBINED N/A 5/29/2023    Procedure: Esophagoscopy, gastroscopy, duodenoscopy (EGD), combined;  Surgeon: Juan Alberto Beck MD;  Location:  GI       Medications   Medications Prior to Admission   Medication Sig Dispense Refill Last Dose     acetaminophen (TYLENOL) 500 MG tablet Take 500 mg by mouth daily        allopurinol (ZYLOPRIM) 100 MG tablet Take 100 mg by mouth daily        atorvastatin (LIPITOR) 10 MG tablet Take 10 mg by mouth daily        bumetanide (BUMEX) 2 MG tablet Take 2 mg by mouth 2 times daily 2 tabs at 0800 and 1 tab at 1300        calcitRIOL (ROCALTROL) 0.25 MCG capsule Take 0.25 mcg by mouth daily        ferrous sulfate (FEROSUL) 325 (65 Fe) MG tablet Take 325 mg by mouth daily (with breakfast)        gabapentin (NEURONTIN) 100 MG capsule Take 100 mg by mouth daily        levothyroxine (SYNTHROID/LEVOTHROID) 100 MCG tablet Take 100 mcg by mouth daily        Multiple Vitamin (MULTIVITAMIN ADULT) TABS Take 1 tablet by mouth  daily        pantoprazole (PROTONIX) 40 MG EC tablet Take 40 mg by mouth 2 times daily (before meals)        potassium chloride ER (KLOR-CON M) 10 MEQ CR tablet Take 2 tablets by mouth daily with food        rOPINIRole (REQUIP) 1 MG tablet Take 1 mg by mouth At Bedtime        sacubitril-valsartan (ENTRESTO) 24-26 MG per tablet Take 0.5 tablets by mouth 2 times daily        spironolactone (ALDACTONE) 25 MG tablet Take 0.5 mg by mouth daily        sucralfate (CARAFATE) 1 GM tablet Take 1 g by mouth 2 times daily (with meals)        urea (CARMOL) 10 % external cream Apply 1 Application topically as needed        warfarin ANTICOAGULANT (COUMADIN) 4 MG tablet Take 4 mg by mouth daily 4 mg Monday, Wednesday, Friday and 6 mg the other 4 days        OTHER MEDICAL SUPPLIES by Other route See Admin Instructions Diabetic shoe             Review of Systems    The 5 point Review of Systems is negative other than noted in the HPI or here.      Social History   I have reviewed this patient's social history and updated it with pertinent information if needed.       Family History   Reviewed from outside records    Allergies   Allergies   Allergen Reactions     Levofloxacin Muscle Pain (Myalgia) and Other (See Comments)     Other reaction(s): Myalgias  Muscle Pain  Muscle Pain  Muscle Pain       Lisinopril Other (See Comments)     Changed to ARB due to rising creatinine  Changed to ARB due to rising creatinine  Changed to ARB due to rising creatinine          Physical Exam   Vital Signs: Temp: 97.8  F (36.6  C) Temp src: Oral BP: 104/60 Pulse: 64   Resp: 17 SpO2: 99 % O2 Device: None (Room air)    Weight: 331 lbs 2.09 oz    General Appearance: NAD  Respiratory: CTA  Cardiovascular: RRR occasional PAC  GI: obese, soft +BS  Skin: warm dry, LE has numerous wounds, venous stasis pigmentary changes bilaterally  Neuro: Non focal    Medical Decision Making     65 MINUTES SPENT BY ME on the date of service doing chart review, history, exam,  documentation & further activities per the note.      Data     Results for orders placed or performed during the hospital encounter of 05/29/23 (from the past 24 hour(s))   Basic metabolic panel   Result Value Ref Range    Sodium 137 136 - 145 mmol/L    Potassium 4.2 3.4 - 5.3 mmol/L    Chloride 102 98 - 107 mmol/L    Carbon Dioxide (CO2) 27 22 - 29 mmol/L    Anion Gap 8 7 - 15 mmol/L    Urea Nitrogen 31.4 (H) 8.0 - 23.0 mg/dL    Creatinine 1.46 (H) 0.67 - 1.17 mg/dL    Calcium 8.6 (L) 8.8 - 10.2 mg/dL    Glucose 112 (H) 70 - 99 mg/dL    GFR Estimate 47 (L) >60 mL/min/1.73m2   CBC with platelets   Result Value Ref Range    WBC Count 5.4 4.0 - 11.0 10e3/uL    RBC Count 2.84 (L) 4.40 - 5.90 10e6/uL    Hemoglobin 8.9 (L) 13.3 - 17.7 g/dL    Hematocrit 27.7 (L) 40.0 - 53.0 %    MCV 98 78 - 100 fL    MCH 31.3 26.5 - 33.0 pg    MCHC 32.1 31.5 - 36.5 g/dL    RDW 14.9 10.0 - 15.0 %    Platelet Count 98 (L) 150 - 450 10e3/uL   Magnesium   Result Value Ref Range    Magnesium 2.1 1.7 - 2.3 mg/dL   Phosphorus   Result Value Ref Range    Phosphorus 2.4 (L) 2.5 - 4.5 mg/dL   INR   Result Value Ref Range    INR 2.39 (H) 0.85 - 1.15

## 2023-06-01 NOTE — PLAN OF CARE
Shift Summary [id number]     No significant changes this shift.  BPs soft but stable off pressors all night.  Tolerating full liquid diet.     Skin of buttocks continues to worsen. Turned q2h and encouraged to shift weight. Discussed coordinating a pulsate mattress when transfer orders placed with oncoming RN.     Plan: Full Code. Advance diet. Likely transfer to floor pending rounding teams and bed availability.

## 2023-06-01 NOTE — PROGRESS NOTES
"Critical Care  Note      06/01/2023    Name: Chacorta Mendoza MRN#: [id number]   Age: 87 year old YOB: 1935     Hsptl Day# 3  ICU DAY # 3                 Problem List:   Shock, possibly septic  Food impaction in esophagus  Possible aspiration pneumonia  H/o CKD 4  H/o diastolic heart failure, a fib,aortic valve replacement, LBBB, h/o heart block s/p pacer  H/o DM2  H/o espohatitis         Summary/Hospital Course:   87M who presented initially to OSH on 5/28 with finding of food impaction in his esophagus.  This was removed via endoscopy however the next day he ate solid food again and his esophagus became re-impacted.  He subsequently developed hypotension requiring initiation of vasopressors and was transferred to the ICU.  For some reason the OSH was unable to repeat endoscopy at their site so he was transferred to [facility] for more definitive management.  Per the h and p of the admitting physician at the outside hospital:  \"I was able to locate [an EGD report] in the [employer] system done in March of this year that revealed grade D esophagitis but no stricture.  He cannot recall ever having a dilation.  He did not follow the antireflux regimen recommended at that time.\"    After arrival here, patient underwent EGD by Dr. Beck with successful advancement of food embolus into the stomach. He was found to have an irregular Zline about 42 cm from the incisors.  No stricture.  He never required more than low dose pressors and was started on low dose midodrine with good response.  On 6/1 dietary and GI cleared his diet to advance to \"small and bite sized\" diet.    This morning he remains in good spirits and denies complaints.  He is excited to have his diet advanced.      Assessment and plan :     Chacorta Mendoza IS a 87 year old male admitted on 5/29 for shock and food impaction.    I have personally reviewed the daily labs, imaging studies, cultures and discussed the case with referring physician and " "consulting physicians.     My assessment and plan by system for this patient is as follows:    Neurology/Psychiatry:   1. Pain/analgesia: prn dilaudid    Cardiovascular:   1.  Low blood pressures:  Improved with low dose midodrine.  Everything seems well perfused (good UOP, mentating well), and it's not entirely clear why he needed pressors to begin with (no other evidence of sepsis, echo with EF 35-40% but this is baseline and his tavr valve has only mildly increased gradients).  LFT's ok.    Now off levophed.  2.  H/o diastolic and systolic heart failure: EF remains 35-40% which is his baseline.  no evidence for acute exacerbation at this time.  Restart home bumex, aldactone, Entresto when clinically appropriate  3.  H/o a fib:   INR 2.39, recheck daily. Warfarin therapy being managed by pharmacist  4.  H/o HLD:  restarted home atorva  5.  H/o aortic valve replacement:  Appears to have been a tavr in 2019 with bioprosthetic valve.    Pulmonary/Ventilator Management:   1. satting acceptably on RA.  CXR with mild b/l haziness; ?mild pulmonary edema? But no clinical manifestations.  2. Per OSH records he has a history of JAZMIN but does not generally adhere to cpap, has been doing ok here at night off of cpap--occasionally needs nasal cannula 2L.    GI and Nutrition :   1. Impacted food embolus:  Appreciate [facility] involvement.  S/p EGD with dilated esophagus and possible candida esophagitis; large amount of food noted but no evidence of eufemia obstruction.  Overall Dr. Beck feels this is likely a motility problem; no stricture. Recommends sitting bolt upright while eating and eating slowly.  Continue SLP.  Advanced diet to \"small bite sized\".  2.  H/o esophagitis:  Cont protonix and sucralfate  3. ? Candida esophagitis.  Diflucan x 7d.    Renal/Fluids/Electrolytes:   1. H/o ckd 4:  Monitor creatinine and UOP.  Creat 1.46 this AM.  Creat at baseline appears to be between 1.5 and 2.0.   2.  Hypophos to 2.4.  " "Replete.    Infectious Disease:   1. Possible aspiration pneumonia: narrow zosyn to CTX today (abx dates 5/29-).  Would consider continuing abx for a total of 7d course but could switch to PO on discharge.  2.  Notified that patient had a tick removed from his left forearm a few days ago and on exam he does appear to have a target lesion at the site.  No other signs/sxs of lyme disease.  Will empirically treat for early localized Lyme disease:  Doxycycline  mg, 10 day course.    Endocrine:   1. H/o hypothyroidism:  home synthroid  2.  H/o DM2:  Insulin therapy as needed.     Hematology/Oncology:   1. Wbc 5.4 ok  2.  hgb 8.9 -- anemia likely 2/2 ckd. No apparent blood loss  3.  Thrombocytopenia to 98    ICU Prophylaxis:   1. DVT: SCDs, home warfarin therapy  2. Feeding - SLP, full liquid per GI.  3. Family Update: pt himself at bedside  4. Disposition - icu    Clinically Significant Risk Factors              # Hypoalbuminemia: Lowest albumin = 3.2 g/dL at 5/29/2023 10:04 PM, will monitor as appropriate   # Thrombocytopenia: Lowest platelets = 98 in last 2 days, will monitor for bleeding   # Hypertension: Noted on problem list  # Chronic heart failure with reduced ejection fraction: last echo with EF <40%       # Severe Obesity: Estimated body mass index is 43.69 kg/m  as calculated from the following:    Height as of 5/28/23: 1.854 m (6' 1\").    Weight as of this encounter: 150.2 kg (331 lb 2.1 oz)., PRESENT ON ADMISSION                       Physical Examination:   Temp:  [98  F (36.7  C)-98.1  F (36.7  C)] 98.1  F (36.7  C)  Pulse:  [58-70] 59  Resp:  [10-61] 17  BP: ()/(41-92) 106/52  SpO2:  [92 %-99 %] 96 %  No intake or output data in the 24 hours ending 05/29/23 1654  Wt Readings from Last 4 Encounters:   06/01/23 (!) 150.2 kg (331 lb 2.1 oz)   05/29/23 142.7 kg (314 lb 9.5 oz)     BP - Mean:  [] 71  Resp: 17    No lab results found in last 7 days.    GEN: no acute distress.  Awake and " alert.  HEENT: head ncat, sclera anicteric, OP patent, trachea midline   PULM: unlabored synchronous, clear anteriorly    CV/COR: RRR S1S2  ABD: soft nontender, hypoactive bowel sounds, no mass  EXT:  warm and well perfused x4  NEURO: PERRL, no obvious deficits  SKIN: no obvious rash, lesion which could be c/w erythema migrans of L forearm at site of tick bite last week  LINES: clean, dry intact         Data:   All data and imaging reviewed     ROUTINE ICU LABS (Last four results)  CMP  Recent Labs   Lab 06/01/23  0512 05/31/23  0533 05/30/23  0519 05/29/23  2204 05/28/23  1800 05/28/23  0709    137 139 138   < > 143   POTASSIUM 4.2 4.0 3.5 3.4   < > 3.5   CHLORIDE 102 102 102 101   < > 101   CO2 27 24 25 25   < > 27   ANIONGAP 8 11 12 12   < > 15   * 113* 112* 115*   < > 143*   BUN 31.4* 36.3* 41.9* 44.6*   < > 48.7*   CR 1.46* 1.63* 1.82* 1.86*   < > 1.92*   GFRESTIMATED 47* 41* 36* 35*   < > 34*   MATTHEW 8.6* 8.8 8.8 8.6*   < > 9.7   MAG 2.1 1.9 2.0 1.9  --   --    PHOS 2.4* 2.0* 2.6 2.8  --   --    PROTTOTAL  --   --   --  6.5  --  7.7   ALBUMIN  --   --   --  3.2*  --  4.0   BILITOTAL  --   --   --  0.7  --  0.8   ALKPHOS  --   --   --  80  --  85   AST  --   --   --  20  --  19   ALT  --   --   --  12  --  11    < > = values in this interval not displayed.     CBC  Recent Labs   Lab 06/01/23 0512 05/31/23  0533 05/30/23  0519 05/29/23  2204   WBC 5.4 8.0 9.0 9.1   RBC 2.84* 3.19* 3.38* 3.22*   HGB 8.9* 9.9* 10.6* 10.1*   HCT 27.7* 31.4* 33.6* 31.9*   MCV 98 98 99 99   MCH 31.3 31.0 31.4 31.4   MCHC 32.1 31.5 31.5 31.7   RDW 14.9 15.4* 15.4* 15.4*   PLT 98* 117* 116* 109*     INR  Recent Labs   Lab 06/01/23  0512 05/31/23  0533 05/30/23  0519 05/29/23  0520   INR 2.39* 2.54* 2.96* 2.94*     Arterial Blood GasNo lab results found in last 7 days.    All cultures:  No results for input(s): CULT in the last 168 hours.  Recent Results (from the past 24 hour(s))   XR Chest Port 1 View    Narrative    EXAM:  XR CHEST PORT 1 VIEW  LOCATION: [facility]  DATE/TIME: 5/29/2023 9:06 AM CDT    INDICATION: Chronic heart failure.  COMPARISON: Chest radiograph 05/28/2023.      Impression    IMPRESSION:    Cardiomegaly and mild interstitial edema have not significant changed since yesterday's exam. Patchy airspace opacities at the lung bases have improved and may reflect improving atelectasis. Left lower lung calcified granuloma is unchanged. No pleural   effusions. No pneumothorax. Atherosclerotic aortic arch calcifications.              Past Medical/surgical hx, meds, allergies, social history, family history   No past medical history on file.  Past Surgical History:   Procedure Laterality Date     ESOPHAGOSCOPY, GASTROSCOPY, DUODENOSCOPY (EGD), COMBINED N/A 5/28/2023    Procedure: ESOPHAGOGASTRODUODENOSCOPY, WITH FOREIGN BODY REMOVAL;  Surgeon: John Carrizales MD;  Location: [facility] OR     ESOPHAGOSCOPY, GASTROSCOPY, DUODENOSCOPY (EGD), COMBINED N/A 5/29/2023    Procedure: Esophagoscopy, gastroscopy, duodenoscopy (EGD), combined;  Surgeon: Juan Alberto Beck MD;  Location:  GI     No current facility-administered medications on file prior to encounter.  acetaminophen (TYLENOL) 500 MG tablet, Take 500 mg by mouth daily  allopurinol (ZYLOPRIM) 100 MG tablet, Take 100 mg by mouth daily  atorvastatin (LIPITOR) 10 MG tablet, Take 10 mg by mouth daily  bumetanide (BUMEX) 2 MG tablet, Take 2 mg by mouth 2 times daily 2 tabs at 0800 and 1 tab at 1300  calcitRIOL (ROCALTROL) 0.25 MCG capsule, Take 0.25 mcg by mouth daily  ferrous sulfate (FEROSUL) 325 (65 Fe) MG tablet, Take 325 mg by mouth daily (with breakfast)  gabapentin (NEURONTIN) 100 MG capsule, Take 100 mg by mouth daily  levothyroxine (SYNTHROID/LEVOTHROID) 100 MCG tablet, Take 100 mcg by mouth daily  Multiple Vitamin (MULTIVITAMIN ADULT) TABS, Take 1 tablet by mouth daily  pantoprazole (PROTONIX) 40 MG EC tablet, Take 40 mg by mouth 2 times daily  (before meals)  potassium chloride ER (KLOR-CON M) 10 MEQ CR tablet, Take 2 tablets by mouth daily with food  rOPINIRole (REQUIP) 1 MG tablet, Take 1 mg by mouth At Bedtime  sacubitril-valsartan (ENTRESTO) 24-26 MG per tablet, Take 0.5 tablets by mouth 2 times daily  spironolactone (ALDACTONE) 25 MG tablet, Take 0.5 mg by mouth daily  sucralfate (CARAFATE) 1 GM tablet, Take 1 g by mouth 2 times daily (with meals)  urea (CARMOL) 10 % external cream, Apply 1 Application topically as needed  warfarin ANTICOAGULANT (COUMADIN) 4 MG tablet, Take 4 mg by mouth daily 4 mg Monday, Wednesday, Friday and 6 mg the other 4 days  OTHER MEDICAL SUPPLIES, by Other route See Admin Instructions Diabetic shoe         Allergies   Allergen Reactions     Levofloxacin Muscle Pain (Myalgia) and Other (See Comments)     Other reaction(s): Myalgias  Muscle Pain  Muscle Pain  Muscle Pain       Lisinopril Other (See Comments)     Changed to ARB due to rising creatinine  Changed to ARB due to rising creatinine  Changed to ARB due to rising creatinine       Social History     Socioeconomic History     Marital status:      Spouse name: Not on file     Number of children: Not on file     Years of education: Not on file     Highest education level: Not on file   Occupational History     Not on file   Tobacco Use     Smoking status: Not on file     Smokeless tobacco: Not on file   Vaping Use     Vaping status: Not on file   Substance and Sexual Activity     Alcohol use: Not on file     Drug use: Not on file     Sexual activity: Not on file   Other Topics Concern     Not on file   Social History Narrative     Not on file     Social Determinants of Health     Financial Resource Strain: Not on file   Food Insecurity: Not on file   Transportation Needs: Not on file   Physical Activity: Not on file   Stress: Not on file   Social Connections: Not on file   Intimate Partner Violence: Not on file   Housing Stability: Not on file     No family history  18 on file.

## 2023-06-01 NOTE — PROGRESS NOTES
Care Management Follow Up    Length of Stay (days): 3    Expected Discharge Date: 06/02/2023     Concerns to be Addressed:       Patient plan of care discussed at interdisciplinary rounds: Yes    Anticipated Discharge Disposition: Transitional Care     Anticipated Discharge Services:    Anticipated Discharge DME:      Patient/family educated on Medicare website which has current facility and service quality ratings: yes  Education Provided on the Discharge Plan:    Patient/Family in Agreement with the Plan: yes    Referrals Placed by CM/SW: Post Acute Facilities  Private pay costs discussed: transportation costs    Additional Information:  Following for discharge planning.  Per bedside RN. Pt's daughter Esther had been here and discussed TCU and has options.    CC spoke with patient who is agreeable to TCU.  Per list provided choices would be   1. [facility]  2. [facility]    Sent via [facility].     CC called daughter Esther who will review medicare list if more choices are needed.    Family can transport at discharge.   Updated that SW will follow when patient goes to the floor.       Mireille Lemus RN

## 2023-06-02 ENCOUNTER — APPOINTMENT (OUTPATIENT)
Dept: PHYSICAL THERAPY | Facility: CLINIC | Age: 88
DRG: 393 | End: 2023-06-02
Attending: INTERNAL MEDICINE
Payer: MEDICARE

## 2023-06-02 ENCOUNTER — APPOINTMENT (OUTPATIENT)
Dept: SPEECH THERAPY | Facility: CLINIC | Age: 88
DRG: 393 | End: 2023-06-02
Attending: INTERNAL MEDICINE
Payer: MEDICARE

## 2023-06-02 LAB
ANION GAP SERPL CALCULATED.3IONS-SCNC: 10 MMOL/L (ref 7–15)
BUN SERPL-MCNC: 27 MG/DL (ref 8–23)
CALCIUM SERPL-MCNC: 8.9 MG/DL (ref 8.8–10.2)
CHLORIDE SERPL-SCNC: 102 MMOL/L (ref 98–107)
CORTIS SERPL-MCNC: 15.8 UG/DL
CREAT SERPL-MCNC: 1.41 MG/DL (ref 0.67–1.17)
DEPRECATED HCO3 PLAS-SCNC: 23 MMOL/L (ref 22–29)
ERYTHROCYTE [DISTWIDTH] IN BLOOD BY AUTOMATED COUNT: 15 % (ref 10–15)
GFR SERPL CREATININE-BSD FRML MDRD: 49 ML/MIN/1.73M2
GLUCOSE SERPL-MCNC: 110 MG/DL (ref 70–99)
HCT VFR BLD AUTO: 28.4 % (ref 40–53)
HGB BLD-MCNC: 9.2 G/DL (ref 13.3–17.7)
INR PPP: 2.23 (ref 0.85–1.15)
MAGNESIUM SERPL-MCNC: 1.9 MG/DL (ref 1.7–2.3)
MCH RBC QN AUTO: 31.3 PG (ref 26.5–33)
MCHC RBC AUTO-ENTMCNC: 32.4 G/DL (ref 31.5–36.5)
MCV RBC AUTO: 97 FL (ref 78–100)
PHOSPHATE SERPL-MCNC: 2.3 MG/DL (ref 2.5–4.5)
PLATELET # BLD AUTO: 118 10E3/UL (ref 150–450)
POTASSIUM SERPL-SCNC: 4.5 MMOL/L (ref 3.4–5.3)
RBC # BLD AUTO: 2.94 10E6/UL (ref 4.4–5.9)
SODIUM SERPL-SCNC: 135 MMOL/L (ref 136–145)
WBC # BLD AUTO: 5.9 10E3/UL (ref 4–11)

## 2023-06-02 PROCEDURE — 93010 ELECTROCARDIOGRAM REPORT: CPT | Performed by: INTERNAL MEDICINE

## 2023-06-02 PROCEDURE — 250N000011 HC RX IP 250 OP 636: Performed by: INTERNAL MEDICINE

## 2023-06-02 PROCEDURE — 92526 ORAL FUNCTION THERAPY: CPT | Mod: GN

## 2023-06-02 PROCEDURE — 120N000001 HC R&B MED SURG/OB

## 2023-06-02 PROCEDURE — 82310 ASSAY OF CALCIUM: CPT | Performed by: INTERNAL MEDICINE

## 2023-06-02 PROCEDURE — 85027 COMPLETE CBC AUTOMATED: CPT | Performed by: INTERNAL MEDICINE

## 2023-06-02 PROCEDURE — 36415 COLL VENOUS BLD VENIPUNCTURE: CPT | Performed by: INTERNAL MEDICINE

## 2023-06-02 PROCEDURE — 85610 PROTHROMBIN TIME: CPT | Performed by: INTERNAL MEDICINE

## 2023-06-02 PROCEDURE — 250N000013 HC RX MED GY IP 250 OP 250 PS 637: Performed by: INTERNAL MEDICINE

## 2023-06-02 PROCEDURE — 99233 SBSQ HOSP IP/OBS HIGH 50: CPT | Performed by: INTERNAL MEDICINE

## 2023-06-02 PROCEDURE — 83735 ASSAY OF MAGNESIUM: CPT | Performed by: INTERNAL MEDICINE

## 2023-06-02 PROCEDURE — 97116 GAIT TRAINING THERAPY: CPT | Mod: GP | Performed by: PHYSICAL THERAPIST

## 2023-06-02 PROCEDURE — 97530 THERAPEUTIC ACTIVITIES: CPT | Mod: GP | Performed by: PHYSICAL THERAPIST

## 2023-06-02 PROCEDURE — 82533 TOTAL CORTISOL: CPT | Performed by: INTERNAL MEDICINE

## 2023-06-02 PROCEDURE — 84100 ASSAY OF PHOSPHORUS: CPT | Performed by: INTERNAL MEDICINE

## 2023-06-02 PROCEDURE — 93005 ELECTROCARDIOGRAM TRACING: CPT

## 2023-06-02 RX ORDER — WARFARIN SODIUM 4 MG/1
4 TABLET ORAL
Status: DISCONTINUED | OUTPATIENT
Start: 2023-06-02 | End: 2023-06-02

## 2023-06-02 RX ADMIN — CEFTRIAXONE SODIUM 1 G: 1 INJECTION, POWDER, FOR SOLUTION INTRAMUSCULAR; INTRAVENOUS at 09:25

## 2023-06-02 RX ADMIN — WARFARIN SODIUM 3 MG: 2 TABLET ORAL at 17:38

## 2023-06-02 RX ADMIN — FLUCONAZOLE 100 MG: 40 POWDER, FOR SUSPENSION ORAL at 09:24

## 2023-06-02 RX ADMIN — SUCRALFATE ORAL 1 G: 1 SUSPENSION ORAL at 11:20

## 2023-06-02 RX ADMIN — MULTIPLE VITAMINS W/ MINERALS TAB 1 TABLET: TAB at 15:01

## 2023-06-02 RX ADMIN — ALLOPURINOL 100 MG: 100 TABLET ORAL at 09:24

## 2023-06-02 RX ADMIN — POTASSIUM & SODIUM PHOSPHATES POWDER PACK 280-160-250 MG 1 PACKET: 280-160-250 PACK at 11:23

## 2023-06-02 RX ADMIN — ROPINIROLE HYDROCHLORIDE 1 MG: 1 TABLET, FILM COATED ORAL at 21:23

## 2023-06-02 RX ADMIN — LEVOTHYROXINE SODIUM 100 MCG: 100 TABLET ORAL at 05:29

## 2023-06-02 RX ADMIN — DOXYCYCLINE 100 MG: 25 FOR SUSPENSION ORAL at 21:24

## 2023-06-02 RX ADMIN — SUCRALFATE ORAL 1 G: 1 SUSPENSION ORAL at 17:38

## 2023-06-02 RX ADMIN — SUCRALFATE ORAL 1 G: 1 SUSPENSION ORAL at 21:23

## 2023-06-02 RX ADMIN — GABAPENTIN 100 MG: 100 CAPSULE ORAL at 09:24

## 2023-06-02 RX ADMIN — ATORVASTATIN CALCIUM 10 MG: 10 TABLET, FILM COATED ORAL at 21:24

## 2023-06-02 RX ADMIN — POTASSIUM & SODIUM PHOSPHATES POWDER PACK 280-160-250 MG 1 PACKET: 280-160-250 PACK at 15:01

## 2023-06-02 RX ADMIN — DOXYCYCLINE 100 MG: 25 FOR SUSPENSION ORAL at 09:24

## 2023-06-02 RX ADMIN — SUCRALFATE ORAL 1 G: 1 SUSPENSION ORAL at 06:48

## 2023-06-02 RX ADMIN — MIDODRINE HYDROCHLORIDE 2.5 MG: 2.5 TABLET ORAL at 09:24

## 2023-06-02 RX ADMIN — Medication 40 MG: at 06:48

## 2023-06-02 ASSESSMENT — ACTIVITIES OF DAILY LIVING (ADL)
ADLS_ACUITY_SCORE: 48
ADLS_ACUITY_SCORE: 43
ADLS_ACUITY_SCORE: 48
ADLS_ACUITY_SCORE: 48
ADLS_ACUITY_SCORE: 43
ADLS_ACUITY_SCORE: 48
ADLS_ACUITY_SCORE: 43

## 2023-06-02 NOTE — PLAN OF CARE
Goal Outcome Evaluation:    Alert and oriented X4, VSS on RA. adimited for food impaction in his oesophagus. Soft & Bite sized diet, scattered skin bruises.  Assist of two with walker and gait belt.  Used urinal at bedside with adequate urine output. denied pain

## 2023-06-02 NOTE — PLAN OF CARE
Patient A/Ox4, VSS,RA. Scattered wounds with dressing, CDI CMS intact. Denies pain.   Ambulate to the bathroom , AX1 w/ Gaitbelt and walker. Sat on the chair during meal.  Good intake and output.      Expected discharge to TCU once placement found.

## 2023-06-02 NOTE — PROGRESS NOTES
[facility]    Internal Medicine Hospitalist Progress Note  06/02/2023  I evaluated patient on the above date.    Christopher Fuentes Jr., MD  [phone] (p)  Text Page  Vocera        Assessment & Plan New actions/orders today (06/02/2023) are underlined. All lab results in the assessment and plan were reviewed.    Chacorta Mendoza is a 87 year old male with history including HTN; CHF; HLD; PAF; AS s/p TAVR; GERD with esophagitis; PPM (leadless); gout; JAZMIN not on CPAP; hypothyroidism; CKD; and obesity; who was initially admitted to OSH 5/28/2023 with food impaction in his esophagus. Subsequently underwent EGD 5/28/2023 with removal with irrigation and lavage. Improved and diet advanced, but at lunch, had symptoms of recurrent food impaction with subsequently development of hypotension requiring pressors. Subsequently transferred to [facility] ICU 5/29/2023 for further management (unclear why repeat endoscopy could not be done at OSH).    EGD performed by Dr. Beck 5/29 with successful advancement of food embolus into the stomach. Pt subsequently improved and was weaned off pressors. Transferred to  Hospitalist service 5/31/2023.      Impacted food bolus, suspect related to esophageal dysmotility - s/p EGD treatment 5/28/2023 (at OSH) with recurrent bolus and repeat 5/29/2023 (at [facility]).  Suspected Candida esophagitis.  Severe grade LA D esophagitis (previously diagnosed).  Dysphagia related to above.  * Noted that EGD report in the [facility] system 3/2023 showed grade D esophagitis but no stricture. No apparent h/o dilatation. Noted that pt did not follow the antireflux regimen recommended at that time.  * Initial presentation as above. EGD 5/29 showed dilation of the esophagus; clotted blood in the middle third of the esophagus; good in the esophagus; patulous lower esophageal sphincter; food (residue) in the stomach; noted successful advancement of food embolus into the stomach.   * GI felt this  was a motility problem and not stricture; also concern for Candida esophagitis and recommended sitting bolt upright while eating and eating slowly. Started on fluconazole on admit. SLP consulted on admit.  - Continue fluconazole (started 5/29) - plan stop after 6/4.  - Continue pantoprazole daily and sucralfate QID.  - Continue current diet: Soft & Bite Sized Diet (level 6) Thin Liquids (level 0)    - Advance diet as able per SLP.  - Patient should continue to sit bolt upright while eating and eat slowly.  - Continue aspiration precautions.     Aspiration pneumonia.  Tick bite with target lesion (left forearm), concern for Lyme disease.  * Initial presentation as above. CXR at OSH 5/28 showed patchy bibasilar opacities; mild bilateral vascular and interstitial prominence that could be a mild degree of edema. Treated with azithromycin and ceftriaxone at OSH. Started on pressors at OSH.  * Continued on pressors in ICU (see below). Started on empiric pipericillin-tazobactam on admit 5/29.  * Doxycycline started 5/30 after MD was notified that pt had a tick removed from his left forearm a few day PTA and on exam he did appear to have a target lesion at the site. No other signs/sxs of Lyme disease noted.  * Sats remained stable in 90's on RA on 6/1. Pipericillin-tazobactam stopped and ceftriaxone started 6/1.  - Continue ceftriaxone (started 6/1) for pneumonia - plan 7 days (stop after 6/7).  - Continue doxycyline (started 5/30) for tick bite with rash - plan 10 day course (stop 6/8).    Hypotension, suspect multifactorial related to hypovolemia, question septic shock component.  * Initial presentation as above. Given IVF's at OSH. Required pressors at OSH prior to transfer.  * Noted with mild hypotension requiring low level pressors in ICU, not clear that this was related to infection/sepsis; subsequently weaned off. Started on midodrine in the ICU.  * BP's improved 6/2. Pt did note that sacubitril-valsartan recently  started PTA and pt had dizziness with this.  - Try stopping midodrine 2.5 mg TID.  - Continue to hold multiple PTA BP meds.    Low HR readings, suspect inaccurate/spurious readings related to PVC's.  CHF (HFrEF).  Paroxysmal atrial fibrillation.  Aortic stenossi s/p TAVR (2019).  PPM (santos, 2019).  H/o hypertension (benign essential).  [PTA BP meds/diuretics: bumetanide 2 mg BID; sacubitril-valsartan 24-26 mg 0.5 tablets BID; spironolactone 0.5 mg daily.]  * PTA meds held on admit.  * Echo 5/31 showed LVEF 35-40% with WMA (noted LVEF 35-40% with similar WMA in 10/2022); RV systolic function moderately reduced; TAVR with gradients borderline mildly increased; technically difficult.  * On 6/2, noted with low HR reading in 30's, asymptomatic. ECG showed paced rhythm with PVC's. Overall, suspect low HR readings related to PVC's.  Pt did note that sacubitril-valsartan recently started PTA and pt had dizziness with this.  Recent Labs   Lab 06/02/23  0657 06/01/23  0512 05/31/23  0533 05/30/23  0519 05/29/23  0520 05/28/23  0709   INR 2.23* 2.39* 2.54* 2.96* 2.94* 2.30*   - Continue to hold PTA bumetanide, sacubitril-valsartan, spironolactone for now given recent hypotension.  - Cautiously restart meds if BP's remain stable or elevated and if renal function stable/improved.  - Continue warfarin per Pharm protocol.  - Continue to monitor HR's and can pursue further workup if low HR readings are associated with symptoms; but doubt this will be necessary as pt has a PPM in place.  - Monitor i/o's, daily wts.    MELIZA, suspect prerenal, on CKD.  * Cr at baseline appears to be between 1.5 and 2.0.   * Cr 1.92 on admit to OSH 5/28.  * Cr subsequently improved with volume/pressors.  Recent Labs   Lab 06/02/23  0657 06/01/23  0512 05/31/23  0533 05/30/23  0519 05/29/23  2204 05/29/23  0520   CR 1.41* 1.46* 1.63* 1.82* 1.86* 1.69*   - Continue to hold PTA diuretics.  - Continue to monitor BMP - repeat in am.  - Avoid nephrotoxic  "medications.    Anemia, suspect chronic component.  * Hgb 10.6 on admit to OSH 5/28. No overt clinical signs of major bleeding on admit.  Recent Labs   Lab 06/02/23  0657 06/01/23  0512 05/31/23  0533 05/30/23  0519 05/29/23 2204 05/29/23  0520   HGB 9.2* 8.9* 9.9* 10.6* 10.1* 9.9*   - Continue to monitor CBC - repeat in am.    Thrombocytopenia, unclear etiology, possibly medication effect or chronic from other cause.  * Noted pt has had low plts in the past.  * Plts 126K on admit to OSH 5/28.  Recent Labs   Lab 06/02/23  0657 06/01/23  0512 05/31/23  0533 05/30/23  0519 05/29/23 2204 05/29/23  0520   * 98* 117* 116* 109* 112*   - Continue to monitor CBC - repeat in am.    Hypothyroidism.  - Continue levothyroxine.    Gout.  - Continue allopurinol.    Weakness and physical deconditioning due to multiple acute and chronic medical issues.  - Continue PT and OT.      Clinically Significant Risk Factors              # Hypoalbuminemia: Lowest albumin = 3.2 g/dL at 5/29/2023 10:04 PM, will monitor as appropriate   # Thrombocytopenia: Lowest platelets = 98 in last 2 days, will monitor for bleeding   # Hypertension: Noted on problem list  # Chronic heart failure with reduced ejection fraction: last echo with EF <40%       # Severe Obesity: Estimated body mass index is 43.69 kg/m  as calculated from the following:    Height as of 5/28/23: 1.854 m (6' 1\").    Weight as of this encounter: 150.2 kg (331 lb 2.1 oz)., PRESENT ON ADMISSION            COVID-19 testing.  COVID-19 PCR Results        8/31/2021    15:38 11/24/2021    11:00 5/18/2022    18:33 10/3/2022    22:55 5/28/2023    07:12   COVID-19 PCR Results   COVID-19 Virus by PCR (External Result) Positive      Negative      Positive      Negative         SARS CoV2 PCR     Negative         This result is from an external source.   COVID-19 Antibody Results, Testing for Immunity         No data to display                Diet: Soft & Bite Sized Diet (level 6) Thin " "Liquids (level 0)    Prophylaxis: PCD's, ambulation. On warfarin.  Kimball Catheter: Not present  Lines: None     Code Status: Full Code    Disposition Plan   Expected discharge: 1-2d recommended to home vs TCU pending continued stability in BP's, tolerance of diet, safe disposition decided (home vs TCU).  Entered: Christopher Fuentes MD 06/02/2023, 9:30 AM     I spent approximately 55 minutes bedside and on the inpatient unit today managing the care of patient. Over 50% of my time on the unit was spent in extensive chart review, counseling the patient/family and/or coordinating care regarding services listed in this note.      Interval History   Doing better.  Ambulating OK with PT; notes feeling \"winded\" after walking, but not unusual for pt.  Tolerating diet, \"what they give me\".    -Data reviewed today: I reviewed all new labs and imaging over the last 24 hours. I personally reviewed the EKG tracing showing paced rhythm with PVC's w/o acute ischemic changes.    Physical Exam    , Blood pressure 107/50, pulse 57, temperature 98.4  F (36.9  C), temperature source Oral, resp. rate 16, weight (!) 150.2 kg (331 lb 2.1 oz), SpO2 95 %. O2 Device: None (Room air)    Vitals:    05/29/23 2000 05/30/23 0545 06/01/23 0700   Weight: 142.2 kg (313 lb 7.9 oz) 142 kg (313 lb 0.9 oz) (!) 150.2 kg (331 lb 2.1 oz)     Vital Signs with Ranges  Temp:  [97.8  F (36.6  C)-98.4  F (36.9  C)] 98.4  F (36.9  C)  Pulse:  [42-98] 57  Resp:  [10-18] 16  BP: ()/(46-79) 107/50  SpO2:  [95 %-99 %] 95 %  Patient Vitals for the past 24 hrs:   BP Temp Temp src Pulse Resp SpO2   06/02/23 0907 -- -- -- 57 -- --   06/02/23 0905 107/50 -- -- (!) 42 -- 95 %   06/02/23 0753 90/57 98.4  F (36.9  C) Oral 65 16 95 %   06/01/23 2200 136/79 98  F (36.7  C) Oral 98 18 --   06/01/23 1852 114/59 -- -- 77 -- --   06/01/23 1630 136/79 -- Oral 80 -- 97 %   06/01/23 1315 104/60 -- -- 64 17 --   06/01/23 1200 119/54 97.8  F (36.6  C) Oral 62 10 99 %   06/01/23 " 1100 -- -- -- 62 15 98 %   06/01/23 1000 107/46 -- -- 62 11 98 %     I/O's Last 24 hours  I/O last 3 completed shifts:  In: 1450 [P.O.:1350; I.V.:100]  Out: 550 [Urine:550]    Constitutional: Awake, alert, pleasant, conversant.  Respiratory: Diminished in bases. No crackles or wheezes.  Cardiovascular: RRR.  GI: Soft, nt, nd, +BS.  Skin/Integumen: Chronic venous stasis changes with trace edema.  Other:        Data    Labs reviewed.  Recent Labs   Lab 06/02/23  0657 06/01/23  0512 05/31/23  0533 05/30/23  0519 05/29/23  2204 05/28/23  1800 05/28/23  0709   WBC 5.9 5.4 8.0   < > 9.1   < > 6.6   HGB 9.2* 8.9* 9.9*   < > 10.1*   < > 10.6*   MCV 97 98 98   < > 99   < > 97   * 98* 117*   < > 109*   < > 126*   INR 2.23* 2.39* 2.54*   < >  --    < > 2.30*   * 137 137   < > 138   < > 143   POTASSIUM 4.5 4.2 4.0   < > 3.4   < > 3.5   CHLORIDE 102 102 102   < > 101   < > 101   CO2 23 27 24   < > 25   < > 27   BUN 27.0* 31.4* 36.3*   < > 44.6*   < > 48.7*   CR 1.41* 1.46* 1.63*   < > 1.86*   < > 1.92*   ANIONGAP 10 8 11   < > 12   < > 15   MATTHEW 8.9 8.6* 8.8   < > 8.6*   < > 9.7   * 112* 113*   < > 115*   < > 143*   ALBUMIN  --   --   --   --  3.2*  --  4.0   PROTTOTAL  --   --   --   --  6.5  --  7.7   BILITOTAL  --   --   --   --  0.7  --  0.8   ALKPHOS  --   --   --   --  80  --  85   ALT  --   --   --   --  12  --  11   AST  --   --   --   --  20  --  19    < > = values in this interval not displayed.     Recent Labs   Lab Test 05/28/23  1027 05/28/23  0709   NT-PROBNP, INPATIENT  --  2,588*   TROPONIN T HIGH SENSITIVITY 41* 42*     Recent Labs   Lab 06/02/23  0657 06/01/23  0512 05/31/23  0533 05/30/23  0519 05/29/23  2204 05/29/23  1950   * 112* 113* 112* 115* 100*     No lab results found.  Recent Labs   Lab 06/02/23  0657 06/01/23  0512 05/31/23  0533 05/30/23  0519 05/29/23  0520 05/28/23  0709   INR 2.23* 2.39* 2.54* 2.96* 2.94* 2.30*     Recent Labs   Lab 06/02/23  0657 06/01/23  0512  05/31/23  0533 05/30/23  0519 05/29/23  2204 05/29/23  1341 05/29/23  1043 05/29/23  0520 05/28/23  0712   WBC 5.9 5.4 8.0 9.0 9.1  --   --  8.2  --    LACT  --   --   --   --  1.7 2.6* 2.7*  --   --    PCAL  --   --   --   --   --   --   --  0.39*  --    [id number]  --   --   --   --   --   --   --   --  Negative       MICRO:  CULTURES (INCLUDING BLOOD AND URINE):  No lab results found in last 7 days.    No results found for this or any previous visit (from the past 24 hour(s)).    Medications   All medications were reviewed.    Infusions:    lactated ringers 10 mL/hr at 05/30/23 1010     Scheduled Medications:    allopurinol  100 mg Oral Daily     atorvastatin  10 mg Oral QPM     cefTRIAXone  1 g Intravenous Q24H     doxycycline monohydrate  100 mg Oral BID     fluconazole  100 mg Oral Daily     gabapentin  100 mg Oral Daily     levothyroxine  100 mcg Oral QAM AC     midodrine  2.5 mg Oral TID w/meals     multivitamin w/minerals  1 tablet Oral Daily     pantoprazole  40 mg Oral QAM AC     potassium & sodium phosphates  1 packet Oral or Feeding Tube Q4H     rOPINIRole  1 mg Oral At Bedtime     sucralfate  1 g Oral 4x Daily AC & HS     Warfarin Therapy Reminder  1 each Oral See Admin Instructions     PRN Medications:  bisacodyl, glucose **OR** dextrose **OR** glucagon, ondansetron **OR** ondansetron, polyethylene glycol, prochlorperazine **OR** prochlorperazine **OR** prochlorperazine, senna-docusate **OR** senna-docusate

## 2023-06-03 ENCOUNTER — APPOINTMENT (OUTPATIENT)
Dept: SPEECH THERAPY | Facility: CLINIC | Age: 88
DRG: 393 | End: 2023-06-03
Attending: INTERNAL MEDICINE
Payer: MEDICARE

## 2023-06-03 LAB
ANION GAP SERPL CALCULATED.3IONS-SCNC: 9 MMOL/L (ref 7–15)
BASOPHILS # BLD AUTO: 0 10E3/UL (ref 0–0.2)
BASOPHILS NFR BLD AUTO: 0 %
BUN SERPL-MCNC: 25.3 MG/DL (ref 8–23)
CALCIUM SERPL-MCNC: 8.9 MG/DL (ref 8.8–10.2)
CHLORIDE SERPL-SCNC: 102 MMOL/L (ref 98–107)
CREAT SERPL-MCNC: 1.45 MG/DL (ref 0.67–1.17)
DEPRECATED HCO3 PLAS-SCNC: 26 MMOL/L (ref 22–29)
EOSINOPHIL # BLD AUTO: 0.3 10E3/UL (ref 0–0.7)
EOSINOPHIL NFR BLD AUTO: 6 %
ERYTHROCYTE [DISTWIDTH] IN BLOOD BY AUTOMATED COUNT: 14.9 % (ref 10–15)
GFR SERPL CREATININE-BSD FRML MDRD: 47 ML/MIN/1.73M2
GLUCOSE SERPL-MCNC: 103 MG/DL (ref 70–99)
HCT VFR BLD AUTO: 28.1 % (ref 40–53)
HGB BLD-MCNC: 8.9 G/DL (ref 13.3–17.7)
IMM GRANULOCYTES # BLD: 0.1 10E3/UL
IMM GRANULOCYTES NFR BLD: 2 %
INR PPP: 1.96 (ref 0.85–1.15)
LYMPHOCYTES # BLD AUTO: 1 10E3/UL (ref 0.8–5.3)
LYMPHOCYTES NFR BLD AUTO: 19 %
MAGNESIUM SERPL-MCNC: 2 MG/DL (ref 1.7–2.3)
MCH RBC QN AUTO: 30.8 PG (ref 26.5–33)
MCHC RBC AUTO-ENTMCNC: 31.7 G/DL (ref 31.5–36.5)
MCV RBC AUTO: 97 FL (ref 78–100)
MONOCYTES # BLD AUTO: 0.7 10E3/UL (ref 0–1.3)
MONOCYTES NFR BLD AUTO: 14 %
NEUTROPHILS # BLD AUTO: 3.2 10E3/UL (ref 1.6–8.3)
NEUTROPHILS NFR BLD AUTO: 59 %
NRBC # BLD AUTO: 0 10E3/UL
NRBC BLD AUTO-RTO: 0 /100
PHOSPHATE SERPL-MCNC: 2.9 MG/DL (ref 2.5–4.5)
PLATELET # BLD AUTO: 131 10E3/UL (ref 150–450)
POTASSIUM SERPL-SCNC: 4.5 MMOL/L (ref 3.4–5.3)
RBC # BLD AUTO: 2.89 10E6/UL (ref 4.4–5.9)
SODIUM SERPL-SCNC: 137 MMOL/L (ref 136–145)
WBC # BLD AUTO: 5.4 10E3/UL (ref 4–11)

## 2023-06-03 PROCEDURE — 250N000011 HC RX IP 250 OP 636: Performed by: INTERNAL MEDICINE

## 2023-06-03 PROCEDURE — 92526 ORAL FUNCTION THERAPY: CPT | Mod: GN

## 2023-06-03 PROCEDURE — 83735 ASSAY OF MAGNESIUM: CPT | Performed by: INTERNAL MEDICINE

## 2023-06-03 PROCEDURE — 250N000013 HC RX MED GY IP 250 OP 250 PS 637: Performed by: INTERNAL MEDICINE

## 2023-06-03 PROCEDURE — 36415 COLL VENOUS BLD VENIPUNCTURE: CPT | Performed by: INTERNAL MEDICINE

## 2023-06-03 PROCEDURE — 99232 SBSQ HOSP IP/OBS MODERATE 35: CPT | Performed by: INTERNAL MEDICINE

## 2023-06-03 PROCEDURE — 80048 BASIC METABOLIC PNL TOTAL CA: CPT | Performed by: INTERNAL MEDICINE

## 2023-06-03 PROCEDURE — 120N000001 HC R&B MED SURG/OB

## 2023-06-03 PROCEDURE — 85014 HEMATOCRIT: CPT | Performed by: INTERNAL MEDICINE

## 2023-06-03 PROCEDURE — 84100 ASSAY OF PHOSPHORUS: CPT | Performed by: INTERNAL MEDICINE

## 2023-06-03 PROCEDURE — 85610 PROTHROMBIN TIME: CPT | Performed by: INTERNAL MEDICINE

## 2023-06-03 RX ORDER — WARFARIN SODIUM 5 MG/1
5 TABLET ORAL
Status: COMPLETED | OUTPATIENT
Start: 2023-06-03 | End: 2023-06-03

## 2023-06-03 RX ADMIN — CEFTRIAXONE SODIUM 1 G: 1 INJECTION, POWDER, FOR SOLUTION INTRAMUSCULAR; INTRAVENOUS at 08:55

## 2023-06-03 RX ADMIN — GABAPENTIN 100 MG: 100 CAPSULE ORAL at 08:55

## 2023-06-03 RX ADMIN — FLUCONAZOLE 100 MG: 40 POWDER, FOR SUSPENSION ORAL at 09:05

## 2023-06-03 RX ADMIN — DOXYCYCLINE 100 MG: 25 FOR SUSPENSION ORAL at 21:06

## 2023-06-03 RX ADMIN — DOXYCYCLINE 100 MG: 25 FOR SUSPENSION ORAL at 09:05

## 2023-06-03 RX ADMIN — ALLOPURINOL 100 MG: 100 TABLET ORAL at 08:55

## 2023-06-03 RX ADMIN — SUCRALFATE ORAL 1 G: 1 SUSPENSION ORAL at 21:06

## 2023-06-03 RX ADMIN — ATORVASTATIN CALCIUM 10 MG: 10 TABLET, FILM COATED ORAL at 21:06

## 2023-06-03 RX ADMIN — LEVOTHYROXINE SODIUM 100 MCG: 100 TABLET ORAL at 06:35

## 2023-06-03 RX ADMIN — MULTIPLE VITAMINS W/ MINERALS TAB 1 TABLET: TAB at 13:52

## 2023-06-03 RX ADMIN — WARFARIN SODIUM 5 MG: 5 TABLET ORAL at 17:36

## 2023-06-03 RX ADMIN — SUCRALFATE ORAL 1 G: 1 SUSPENSION ORAL at 06:35

## 2023-06-03 RX ADMIN — ROPINIROLE HYDROCHLORIDE 1 MG: 1 TABLET, FILM COATED ORAL at 21:06

## 2023-06-03 RX ADMIN — SUCRALFATE ORAL 1 G: 1 SUSPENSION ORAL at 11:30

## 2023-06-03 RX ADMIN — Medication 40 MG: at 06:35

## 2023-06-03 RX ADMIN — SUCRALFATE ORAL 1 G: 1 SUSPENSION ORAL at 16:55

## 2023-06-03 ASSESSMENT — ACTIVITIES OF DAILY LIVING (ADL)
ADLS_ACUITY_SCORE: 48

## 2023-06-03 NOTE — PLAN OF CARE
Goal Outcome Evaluation:    A&O, VSS on  RA, denies pain easy to chew diet, A1 GB and W, needs to encourage to ambulate OOB more, wound care every other day, continue to monitor.

## 2023-06-03 NOTE — PROGRESS NOTES
A&Ox4, VSS on room air. CMS intact. Denies of pain, nausea/vomitting. Tolerating easy to chew diet. Denies of any throat pain or swallowing difficulties. Voiding adequately. Assist of 1, gait belt walker.

## 2023-06-03 NOTE — PROGRESS NOTES
Care Management Follow Up    Length of Stay (days): 5    Expected Discharge Date: 06/03/2023     Concerns to be Addressed:       Patient plan of care discussed at interdisciplinary rounds: Yes    Anticipated Discharge Disposition: Transitional Care     Anticipated Discharge Services:    Anticipated Discharge DME:      Patient/family educated on Medicare website which has current facility and service quality ratings: yes  Education Provided on the Discharge Plan:    Patient/Family in Agreement with the Plan: yes    Referrals Placed by CM/SW: Post Acute Facilities  Private pay costs discussed: Not applicable    Additional Information:  Writer received a call from patients daughter who asked about discharge plan for TCU.  Informed her one facility has declined  And we will need more choices.    0947: sister Myla called with 2 more choices; [facility].  She also would like us to resubmit (on 6/5)  the [place] [facility] that declined.    1300:  [facility] asking what patients current diet is.  Writer called back and LVM indicated Dr. Fuentes's order from 4:11 on 6/2 Easy to chew, level 7 and  Liquid level 0.      Flora Tyler RN

## 2023-06-03 NOTE — PROGRESS NOTES
Pt A&Ox4; VSS on RA. Denies pain or SOB this shift. Voiding well via urinal. Scattered wounds and bruises continue; foam dressings CDI. IV SL. On K, Mg, and P protocol; recheck this morning. Slept well this shift. Awaiting TCU placement.

## 2023-06-03 NOTE — PROGRESS NOTES
[facility]    Hospitalist Progress Note    Assessment & Plan   Chacorta Mendoza is a 87 year old male with history including HTN; CHF; HLD; PAF; AS s/p TAVR; GERD with esophagitis; PPM (leadless); gout; JAZMIN not on CPAP; hypothyroidism; CKD; and obesity; who was initially admitted to OSH 5/28/2023 with food impaction in his esophagus. Subsequently underwent EGD 5/28/2023 with removal with irrigation and lavage. Improved and diet advanced, but at lunch, had symptoms of recurrent food impaction with subsequently development of hypotension requiring pressors. Subsequently transferred to [facility] ICU 5/29/2023 for further management (unclear why repeat endoscopy could not be done at OSH).     EGD performed by Dr. Beck 5/29 with successful advancement of food embolus into the stomach. Pt subsequently improved and was weaned off pressors. Transferred to  Hospitalist service 5/31/2023.        Impacted food bolus, suspect related to esophageal dysmotility - s/p EGD treatment 5/28/2023 (at OSH) with recurrent bolus and repeat 5/29/2023 (at [facility]).  Suspected Candida esophagitis.  Severe grade LA D esophagitis (previously diagnosed).  Dysphagia related to above.  * Noted that EGD report in the [facility] system 3/2023 showed grade D esophagitis but no stricture. No apparent h/o dilatation. Noted that pt did not follow the antireflux regimen recommended at that time.  * Initial presentation as above. EGD 5/29 showed dilation of the esophagus; clotted blood in the middle third of the esophagus; good in the esophagus; patulous lower esophageal sphincter; food (residue) in the stomach; noted successful advancement of food embolus into the stomach.   * GI felt this was a motility problem and not stricture; also concern for Candida esophagitis and recommended sitting bolt upright while eating and eating slowly. Started on fluconazole on admit. SLP consulted on admit.  -no complaints. Swallowing fine.  Brought up some clear sputum today. No gerd sxs. No abd pain. No rn concerns    Plan:   - Continue fluconazole (started 5/29) - plan stop after 6/4.  - Continue pantoprazole daily and sucralfate QID.  - Continue current diet: Soft & Bite Sized Diet (level 6) Thin Liquids (level 0)    - Advance diet as able per SLP.  - Patient should continue to sit bolt upright while eating and eat slowly.  - Continue aspiration precautions.  -outpatient GI follow up - [facility]     Aspiration pneumonia.  Tick bite with target lesion (left forearm), concern for Lyme disease.  * Initial presentation as above. CXR at OSH 5/28 showed patchy bibasilar opacities; mild bilateral vascular and interstitial prominence that could be a mild degree of edema. Treated with azithromycin and ceftriaxone at OSH. Started on pressors at OSH.  * Continued on pressors in ICU (see below). Started on empiric pipericillin-tazobactam on admit 5/29.  * Doxycycline started 5/30 after MD was notified that pt had a tick removed from his left forearm a few day PTA and on exam he did appear to have a target lesion at the site. No other signs/sxs of Lyme disease noted.  * Sats remained stable in 90's on RA on 6/1. Pipericillin-tazobactam stopped and ceftriaxone started 6/1.  -lungs sound clear. Off oxygen    Plan;   - Continue ceftriaxone (started 6/1) for pneumonia - plan 7 days (stop after 6/7).  - Continue doxycyline (started 5/30) for tick bite with rash - plan 10 day course (stop 6/8).     Hypotension, suspect multifactorial related to hypovolemia, question septic shock component.  * Initial presentation as above. Given IVF's at OSH. Required pressors at OSH prior to transfer.  * Noted with mild hypotension requiring low level pressors in ICU, not clear that this was related to infection/sepsis; subsequently weaned off. Started on midodrine in the ICU.  * BP's improved 6/2. Pt did note that sacubitril-valsartan recently started PTA and pt had  dizziness with this.  - Trial of stopping midodrine 2.5 mg TID started 6/2  -has been normotensive (low nl sbp)since stopping midodrine  - Continue to hold multiple PTA BP meds.     Low HR readings, suspect inaccurate/spurious readings related to PVC's.  CHF (HFrEF).  Paroxysmal atrial fibrillation.  Aortic stenossi s/p TAVR (2019).  PPM (leadless, 2019).  H/o hypertension (benign essential).  [PTA BP meds/diuretics: bumetanide 2 mg BID; sacubitril-valsartan 24-26 mg 0.5 tablets BID; spironolactone 0.5 mg daily.]  * PTA meds held on admit.  * Echo 5/31 showed LVEF 35-40% with WMA (noted LVEF 35-40% with similar WMA in 10/2022); RV systolic function moderately reduced; TAVR with gradients borderline mildly increased; technically difficult.  * On 6/2, noted with low HR reading in 30's, asymptomatic. ECG showed paced rhythm with PVC's. Overall, suspect low HR readings related to PVC's.  Pt did note that sacubitril-valsartan recently started PTA and pt had dizziness with this.           Recent Labs   Lab 06/02/23  0657 06/01/23  0512 05/31/23  0533 05/30/23  0519 05/29/23  0520 05/28/23  0709   INR 2.23* 2.39* 2.54* 2.96* 2.94* 2.30*     Plan;   - Continue to hold PTA bumetanide, sacubitril-valsartan, spironolactone for now given recent hypotension.  - Cautiously restart meds if BP's remain stable or elevated and if renal function stable/improved outpatient  - Continue warfarin per Pharm protocol.  - Continue to monitor HR's and can pursue further workup if low HR readings are associated with symptoms; but doubt this will be necessary as pt has a PPM in place.  - Monitor i/o's, daily wts.  -follow sbp      MELIZA, suspect prerenal, on CKD.  * Cr at baseline appears to be between 1.5 and 2.0.   * Cr 1.92 on admit to OSH 5/28.  * Cr subsequently improved with volume/pressors.           Recent Labs   Lab 06/02/23  0657 06/01/23  0512 05/31/23  0533 05/30/23  0519 05/29/23  2204 05/29/23  0520   CR 1.41* 1.46* 1.63* 1.82*  1.86* 1.69*     Stable creatinine at 1.4 on 6/3.   Plan;   - Continue to hold PTA diuretics.  - Continue to monitor BMP, recheck several day and at tcu  - Avoid nephrotoxic medications.     Anemia, suspect chronic component.  * Hgb 10.6 on admit to OSH 5/28. No overt clinical signs of major bleeding on admit.           Recent Labs   Lab 06/02/23  0657 06/01/23  0512 05/31/23  0533 05/30/23  0519 05/29/23 2204 05/29/23  0520   HGB 9.2* 8.9* 9.9* 10.6* 10.1* 9.9*   Repeat Hb of 8.9 on 6/3.     - Continue to monitor CBC at tcu     Thrombocytopenia, unclear etiology, possibly medication effect or chronic from other cause.  * Noted pt has had low plts in the past.  * Plts 126K on admit to OSH 5/28.           Recent Labs   Lab 06/02/23  0657 06/01/23  0512 05/31/23  0533 05/30/23  0519 05/29/23 2204 05/29/23  0520   * 98* 117* 116* 109* 112*   Platelet count 131 on 6/3. Improving  -slightly nodular contour to liver in 3/2022 but nl spleen  - noted to be 130 range outside facility in 3/2023.   - Continue to monitor CBC at tcu  -if remains low outpatient then should check folate, tsh, B12 level and peripheral smear     Hypothyroidism.  - Continue levothyroxine.     Gout.  - Continue allopurinol.     Weakness and physical deconditioning due to multiple acute and chronic medical issues.  - Continue PT and OT.  -TCU placement      chronic venous stasis changes   wound care rn consulted  Wound cares in place  No superimposed infection   Cont current wound cares        Clinically Significant Risk Factors              # Hypoalbuminemia: Lowest albumin = 3.2 g/dL at 5/29/2023 10:04 PM, will monitor as appropriate   # Thrombocytopenia: Lowest platelets = 98 in last 2 days, will monitor for bleeding   # Hypertension: Noted on problem list  # Chronic heart failure with reduced ejection fraction: last echo with EF <40%       # Severe Obesity: Estimated body mass index is 43.69 kg/m  as calculated from the following:    Height  "as of 5/28/23: 1.854 m (6' 1\").    Weight as of this encounter: 150.2 kg (331 lb 2.1 oz)., PRESENT ON ADMISSION                 COVID-19 testing.          COVID-19 PCR Results         8/31/2021    15:38 11/24/2021    11:00 5/18/2022    18:33 10/3/2022    22:55 5/28/2023    07:12   COVID-19 PCR Results   COVID-19 Virus by PCR (External Result) Positive      Negative      Positive      Negative          SARS CoV2 PCR         Negative              This result is from an external source.       COVID-19 Antibody Results, Testing for Immunity           No data to display                   Diet: Soft & Bite Sized Diet (level 6) Thin Liquids (level 0)    Prophylaxis: PCD's, ambulation. On warfarin.  Kimball Catheter: Not present  Lines: None     Code Status: Full Code        Disposition Plan  Expected discharge: pt will need tcu, awaiting placement sw consulted    Yayo Hanson MD, MD  Text Page  (7am to 6pm)  Interval History    Stable bmp  Stable blood counts  Nl vitals. Afebrile. No acute issues  Slept well   No rn concerns  Up with therapy  No swallowing issues  No sob     -Data reviewed today: I reviewed all new labs and imaging results over the last 24 hours. I personally reviewed labs from last 24 hours    Physical Exam   Temp: 98  F (36.7  C) Temp src: Oral BP: 104/57 Pulse: 63   Resp: 16 SpO2: 95 % O2 Device: None (Room air)    Vitals:    05/29/23 2000 05/30/23 0545 06/01/23 0700   Weight: 142.2 kg (313 lb 7.9 oz) 142 kg (313 lb 0.9 oz) (!) 150.2 kg (331 lb 2.1 oz)     Vital Signs with Ranges  Temp:  [96.9  F (36.1  C)-98  F (36.7  C)] 98  F (36.7  C)  Pulse:  [63-94] 63  Resp:  [16-17] 16  BP: ()/(55-63) 104/57  SpO2:  [94 %-96 %] 95 %  I/O last 3 completed shifts:  In: 1100 [P.O.:1100]  Out: 1550 [Urine:1550]    Constitutional: Up in chair, nad  Respiratory: CTAB. Breathing easiliiy   Cardiovascular: RRR no r/g/m  GI: soft, nt nd  Skin/Integumen: no rash, chronic venous stasis changes  Pretibial wounds " bandaged  Neuro: nl speech and alert  Psych: nl affect       Medications     lactated ringers 10 mL/hr at 05/30/23 1010       allopurinol  100 mg Oral Daily     atorvastatin  10 mg Oral QPM     cefTRIAXone  1 g Intravenous Q24H     doxycycline monohydrate  100 mg Oral BID     fluconazole  100 mg Oral Daily     gabapentin  100 mg Oral Daily     levothyroxine  100 mcg Oral QAM AC     multivitamin w/minerals  1 tablet Oral Daily     pantoprazole  40 mg Oral QAM AC     rOPINIRole  1 mg Oral At Bedtime     sucralfate  1 g Oral 4x Daily AC & HS     warfarin ANTICOAGULANT  5 mg Oral ONCE at 18:00     Warfarin Therapy Reminder  1 each Oral See Admin Instructions       Data   Recent Labs   Lab 06/03/23  0716 06/02/23  0657 06/01/23  0512 05/30/23  0519 05/29/23  2204 05/28/23  1800 05/28/23  0709   WBC 5.4 5.9 5.4   < > 9.1   < > 6.6   HGB 8.9* 9.2* 8.9*   < > 10.1*   < > 10.6*   MCV 97 97 98   < > 99   < > 97   * 118* 98*   < > 109*   < > 126*   INR 1.96* 2.23* 2.39*   < >  --    < > 2.30*    135* 137   < > 138   < > 143   POTASSIUM 4.5 4.5 4.2   < > 3.4   < > 3.5   CHLORIDE 102 102 102   < > 101   < > 101   CO2 26 23 27   < > 25   < > 27   BUN 25.3* 27.0* 31.4*   < > 44.6*   < > 48.7*   CR 1.45* 1.41* 1.46*   < > 1.86*   < > 1.92*   ANIONGAP 9 10 8   < > 12   < > 15   MATTHEW 8.9 8.9 8.6*   < > 8.6*   < > 9.7   * 110* 112*   < > 115*   < > 143*   ALBUMIN  --   --   --   --  3.2*  --  4.0   PROTTOTAL  --   --   --   --  6.5  --  7.7   BILITOTAL  --   --   --   --  0.7  --  0.8   ALKPHOS  --   --   --   --  80  --  85   ALT  --   --   --   --  12  --  11   AST  --   --   --   --  20  --  19    < > = values in this interval not displayed.       Imaging:   No results found for this or any previous visit (from the past 24 hour(s)).

## 2023-06-04 LAB
ANION GAP SERPL CALCULATED.3IONS-SCNC: 10 MMOL/L (ref 7–15)
BUN SERPL-MCNC: 26.4 MG/DL (ref 8–23)
CALCIUM SERPL-MCNC: 8.5 MG/DL (ref 8.8–10.2)
CHLORIDE SERPL-SCNC: 104 MMOL/L (ref 98–107)
CREAT SERPL-MCNC: 1.35 MG/DL (ref 0.67–1.17)
DEPRECATED HCO3 PLAS-SCNC: 24 MMOL/L (ref 22–29)
ERYTHROCYTE [DISTWIDTH] IN BLOOD BY AUTOMATED COUNT: 14.9 % (ref 10–15)
GFR SERPL CREATININE-BSD FRML MDRD: 51 ML/MIN/1.73M2
GLUCOSE SERPL-MCNC: 100 MG/DL (ref 70–99)
HCT VFR BLD AUTO: 27.2 % (ref 40–53)
HGB BLD-MCNC: 8.7 G/DL (ref 13.3–17.7)
INR PPP: 1.84 (ref 0.85–1.15)
MAGNESIUM SERPL-MCNC: 1.9 MG/DL (ref 1.7–2.3)
MCH RBC QN AUTO: 31.2 PG (ref 26.5–33)
MCHC RBC AUTO-ENTMCNC: 32 G/DL (ref 31.5–36.5)
MCV RBC AUTO: 98 FL (ref 78–100)
PHOSPHATE SERPL-MCNC: 2.9 MG/DL (ref 2.5–4.5)
PLATELET # BLD AUTO: 131 10E3/UL (ref 150–450)
POTASSIUM SERPL-SCNC: 3.9 MMOL/L (ref 3.4–5.3)
RBC # BLD AUTO: 2.79 10E6/UL (ref 4.4–5.9)
SODIUM SERPL-SCNC: 138 MMOL/L (ref 136–145)
WBC # BLD AUTO: 5 10E3/UL (ref 4–11)

## 2023-06-04 PROCEDURE — 85610 PROTHROMBIN TIME: CPT | Performed by: INTERNAL MEDICINE

## 2023-06-04 PROCEDURE — 36415 COLL VENOUS BLD VENIPUNCTURE: CPT | Performed by: INTERNAL MEDICINE

## 2023-06-04 PROCEDURE — 250N000013 HC RX MED GY IP 250 OP 250 PS 637: Performed by: INTERNAL MEDICINE

## 2023-06-04 PROCEDURE — 83735 ASSAY OF MAGNESIUM: CPT | Performed by: INTERNAL MEDICINE

## 2023-06-04 PROCEDURE — 80048 BASIC METABOLIC PNL TOTAL CA: CPT | Performed by: INTERNAL MEDICINE

## 2023-06-04 PROCEDURE — 85027 COMPLETE CBC AUTOMATED: CPT | Performed by: INTERNAL MEDICINE

## 2023-06-04 PROCEDURE — 250N000011 HC RX IP 250 OP 636: Performed by: INTERNAL MEDICINE

## 2023-06-04 PROCEDURE — 84100 ASSAY OF PHOSPHORUS: CPT | Performed by: INTERNAL MEDICINE

## 2023-06-04 PROCEDURE — 99233 SBSQ HOSP IP/OBS HIGH 50: CPT | Performed by: INTERNAL MEDICINE

## 2023-06-04 PROCEDURE — 120N000001 HC R&B MED SURG/OB

## 2023-06-04 RX ORDER — WARFARIN SODIUM 5 MG/1
5 TABLET ORAL
Status: COMPLETED | OUTPATIENT
Start: 2023-06-04 | End: 2023-06-04

## 2023-06-04 RX ORDER — BUMETANIDE 1 MG/1
2 TABLET ORAL ONCE
Status: COMPLETED | OUTPATIENT
Start: 2023-06-04 | End: 2023-06-04

## 2023-06-04 RX ADMIN — Medication 40 MG: at 06:44

## 2023-06-04 RX ADMIN — SUCRALFATE ORAL 1 G: 1 SUSPENSION ORAL at 12:23

## 2023-06-04 RX ADMIN — FLUCONAZOLE 100 MG: 40 POWDER, FOR SUSPENSION ORAL at 10:03

## 2023-06-04 RX ADMIN — WARFARIN SODIUM 5 MG: 5 TABLET ORAL at 18:02

## 2023-06-04 RX ADMIN — ROPINIROLE HYDROCHLORIDE 1 MG: 1 TABLET, FILM COATED ORAL at 21:09

## 2023-06-04 RX ADMIN — LEVOTHYROXINE SODIUM 100 MCG: 100 TABLET ORAL at 06:44

## 2023-06-04 RX ADMIN — SUCRALFATE ORAL 1 G: 1 SUSPENSION ORAL at 15:34

## 2023-06-04 RX ADMIN — ALLOPURINOL 100 MG: 100 TABLET ORAL at 08:28

## 2023-06-04 RX ADMIN — GABAPENTIN 100 MG: 100 CAPSULE ORAL at 08:28

## 2023-06-04 RX ADMIN — CEFTRIAXONE SODIUM 1 G: 1 INJECTION, POWDER, FOR SOLUTION INTRAMUSCULAR; INTRAVENOUS at 10:03

## 2023-06-04 RX ADMIN — SUCRALFATE ORAL 1 G: 1 SUSPENSION ORAL at 21:10

## 2023-06-04 RX ADMIN — SUCRALFATE ORAL 1 G: 1 SUSPENSION ORAL at 06:44

## 2023-06-04 RX ADMIN — ATORVASTATIN CALCIUM 10 MG: 10 TABLET, FILM COATED ORAL at 21:10

## 2023-06-04 RX ADMIN — BUMETANIDE 2 MG: 1 TABLET ORAL at 12:23

## 2023-06-04 RX ADMIN — MULTIPLE VITAMINS W/ MINERALS TAB 1 TABLET: TAB at 15:34

## 2023-06-04 ASSESSMENT — ACTIVITIES OF DAILY LIVING (ADL)
ADLS_ACUITY_SCORE: 48

## 2023-06-04 NOTE — PROGRESS NOTES
Pt A&Ox4; VSS on RA. Denies pain or SOB this shift. Ambulated to BR a few times this shift; voiding well. Scattered wounds and bruises continue; foam dressings CDI. IV SL. On K, Mg, and P protocol; recheck this morning. Pt wants to have a shower in the morning; will relay message to am nurse. Slept well this shift. Awaiting TCU placement.

## 2023-06-04 NOTE — PROGRESS NOTES
VSS on room air. A&Ox4. CMS intact. Tolerating regular diet. Denies pain or abdominal discomfort. Denies SOB. Voiding adequately. Dressings changed this afternoon. Denies throat pain or trouble swallowing.

## 2023-06-04 NOTE — PROGRESS NOTES
[facility]    Hospitalist Progress Note    Assessment & Plan   Chacorta Mendoza is a 87 year old male with history including HTN; CHF; HLD; PAF; AS s/p TAVR; GERD with esophagitis; PPM (leadless); gout; JAZMIN not on CPAP; hypothyroidism; CKD; and obesity; who was initially admitted to OSH 5/28/2023 with food impaction in his esophagus. Subsequently underwent EGD 5/28/2023 with removal with irrigation and lavage. Improved and diet advanced, but at lunch, had symptoms of recurrent food impaction with subsequently development of hypotension requiring pressors. Subsequently transferred to [facility] ICU 5/29/2023 for further management (unclear why repeat endoscopy could not be done at OSH).     EGD performed by Dr. Beck 5/29 with successful advancement of food embolus into the stomach. Pt subsequently improved and was weaned off pressors. Transferred to  Hospitalist service 5/31/2023.        Impacted food bolus, suspect related to esophageal dysmotility - s/p EGD treatment 5/28/2023 (at OSH) with recurrent bolus and repeat 5/29/2023 (at [facility]).  Suspected Candida esophagitis.  Severe grade LA D esophagitis (previously diagnosed).  Dysphagia related to above.  * Noted that EGD report in the [facility] system 3/2023 showed grade D esophagitis but no stricture. No apparent h/o dilatation. Noted that pt did not follow the antireflux regimen recommended at that time.  * Initial presentation as above. EGD 5/29 showed dilation of the esophagus; clotted blood in the middle third of the esophagus; good in the esophagus; patulous lower esophageal sphincter; food (residue) in the stomach; noted successful advancement of food embolus into the stomach.   * GI felt this was a motility problem and not stricture; also concern for Candida esophagitis and recommended sitting bolt upright while eating and eating slowly. Started on fluconazole on admit. SLP consulted on admit.  -no complaints. Swallowing fine.  Brought up some clear sputum today. No gerd sxs. No abd pain. No rn concerns    Today. No concerns again today. No gi sxs.     Plan:   - Continue fluconazole (started 5/29) - plan stop after 6/4.  - Continue pantoprazole daily and sucralfate QID.  - Continue current diet: Soft & Bite Sized Diet (level 6) Thin Liquids (level 0)    - Advance diet as able per SLP.  - Patient should continue to sit bolt upright while eating and eat slowly.  - Continue aspiration precautions.  -outpatient GI follow up - [facility]     Aspiration pneumonia.  Tick bite with target lesion (left forearm), concern for Lyme disease.  * Initial presentation as above. CXR at OSH 5/28 showed patchy bibasilar opacities; mild bilateral vascular and interstitial prominence that could be a mild degree of edema. Treated with azithromycin and ceftriaxone at OSH. Started on pressors at OSH.  * Continued on pressors in ICU (see below). Started on empiric pipericillin-tazobactam on admit 5/29.  * Doxycycline started 5/30 after MD was notified that pt had a tick removed from his left forearm a few day PTA and on exam he did appear to have a target lesion at the site. No other signs/sxs of Lyme disease noted.  * Sats remained stable in 90's on RA on 6/1. Pipericillin-tazobactam stopped and ceftriaxone started 6/1.  -lungs sound clear. Off oxygen     Plan;   - Continue ceftriaxone (started 6/1) for pneumonia - plan 7 days (stop after 6/7).  - Continue doxycyline (started 5/30) for tick bite with rash - plan 10 day course (stop 6/8).     Hypotension, suspect multifactorial related to hypovolemia, question septic shock component.  * Initial presentation as above. Given IVF's at OSH. Required pressors at OSH prior to transfer.  * Noted with mild hypotension requiring low level pressors in ICU, not clear that this was related to infection/sepsis; subsequently weaned off. Started on midodrine in the ICU.  * BP's improved 6/2. Pt did note that  sacubitril-valsartan recently started PTA and pt had dizziness with this.  - Trial of stopping midodrine 2.5 mg TID started 6/2  -has been normotensive (low nl sbp)since stopping midodrine  - Continue to hold multiple PTA BP meds.     Low HR readings, suspect inaccurate/spurious readings related to PVC's.  CHF (HFrEF).  Paroxysmal atrial fibrillation.  Aortic stenossi s/p TAVR (2019).  PPM (leadless, 2019).  H/o hypertension (benign essential).  [PTA BP meds/diuretics: bumetanide 2 mg BID; sacubitril-valsartan 24-26 mg 0.5 tablets BID; spironolactone 0.5 mg daily.]  * PTA meds held on admit.  * Echo 5/31 showed LVEF 35-40% with WMA (noted LVEF 35-40% with similar WMA in 10/2022); RV systolic function moderately reduced; TAVR with gradients borderline mildly increased; technically difficult.  * On 6/2, noted with low HR reading in 30's, asymptomatic. ECG showed paced rhythm with PVC's. Overall, suspect low HR readings related to PVC's.  Pt did note that sacubitril-valsartan recently started PTA and pt had dizziness with this.           Recent Labs   Lab 06/02/23  0657 06/01/23  0512 05/31/23  0533 05/30/23  0519 05/29/23  0520 05/28/23  0709   INR 2.23* 2.39* 2.54* 2.96* 2.94* 2.30*     bp up to 120s/-     Plan;   - Continue to hold PTA bumetanide, sacubitril-valsartan, spironolactone for now given recent hypotension.  - Cautiously restart meds if BP's remain stable or elevated and if renal function stable/improved outpatient  - Continue warfarin per Pharm protocol.  - Continue to monitor HR's and can pursue further workup if low HR readings are associated with symptoms; but doubt this will be necessary as pt has a PPM in place.  - Monitor i/o's, daily wts.  -follow sbp     - may restart bumex today at 2mg X 1 and reeval am, am bmp     MELIZA, suspect prerenal, on CKD.  * Cr at baseline appears to be between 1.5 and 2.0.   * Cr 1.92 on admit to OSH 5/28.  * Cr subsequently improved with volume/pressors.           Recent  Labs   Lab 06/02/23  0657 06/01/23  0512 05/31/23  0533 05/30/23  0519 05/29/23 2204 05/29/23  0520   CR 1.41* 1.46* 1.63* 1.82* 1.86* 1.69*     Stable creatinine at 1.4 on 6/3.   Plan;   - Continue to hold PTA diuretics--> restart bumex 2mg X 1 today and reeval tomorrow  - Continue to monitor BMP, recheck several day and at tcu  - Avoid nephrotoxic medications.     Anemia, suspect chronic component.  * Hgb 10.6 on admit to OSH 5/28. No overt clinical signs of major bleeding on admit.           Recent Labs   Lab 06/02/23  0657 06/01/23  0512 05/31/23  0533 05/30/23 0519 05/29/23 2204 05/29/23  0520   HGB 9.2* 8.9* 9.9* 10.6* 10.1* 9.9*   Repeat Hb of 8.9 on 6/3.     - Continue to monitor CBC at tcu     Thrombocytopenia, unclear etiology, possibly medication effect or chronic from other cause.  * Noted pt has had low plts in the past.  * Plts 126K on admit to OSH 5/28.           Recent Labs   Lab 06/02/23  0657 06/01/23  0512 05/31/23 0533 05/30/23 0519 05/29/23 2204 05/29/23  0520   * 98* 117* 116* 109* 112*   Platelet count 131 on 6/3. Improving and now stable at 131  -slightly nodular contour to liver in 3/2022 but nl spleen  - noted to be 130 range outside facility in 3/2023.   - Continue to monitor CBC at tcu  -if remains low outpatient then should check folate, tsh, B12 level and peripheral smear     Hypothyroidism.  - Continue levothyroxine.     Gout.  - Continue allopurinol.     Weakness and physical deconditioning due to multiple acute and chronic medical issues.  - Continue PT and OT.  -TCU placement      chronic venous stasis changes   wound care rn consulted  Wound cares in place  No superimposed infection   Cont current wound cares        Clinically Significant Risk Factors              # Hypoalbuminemia: Lowest albumin = 3.2 g/dL at 5/29/2023 10:04 PM, will monitor as appropriate   # Thrombocytopenia: Lowest platelets = 98 in last 2 days, will monitor for bleeding   # Hypertension: Noted on  "problem list  # Chronic heart failure with reduced ejection fraction: last echo with EF <40%       # Severe Obesity: Estimated body mass index is 43.69 kg/m  as calculated from the following:    Height as of 5/28/23: 1.854 m (6' 1\").    Weight as of this encounter: 150.2 kg (331 lb 2.1 oz)., PRESENT ON ADMISSION                 COVID-19 testing.          COVID-19 PCR Results         8/31/2021    15:38 11/24/2021    11:00 5/18/2022    18:33 10/3/2022    22:55 5/28/2023    07:12   COVID-19 PCR Results   COVID-19 Virus by PCR (External Result) Positive      Negative      Positive      Negative          SARS CoV2 PCR         Negative              This result is from an external source.       COVID-19 Antibody Results, Testing for Immunity           No data to display                   Diet: Soft & Bite Sized Diet (level 6) Thin Liquids (level 0)    Prophylaxis: PCD's, ambulation. On warfarin.  Kimball Catheter: Not present  Lines: None     Code Status: Full Code        Disposition Plan  Expected discharge: pt will need tcu, awaiting placement sw consulted    Yayo Hanson MD, MD  Text Page  (7am to 6pm)  Interval History    Walked few times to bR  Voiding well  No acute events  Up for shower  No complaints.     -Data reviewed today: I reviewed all new labs and imaging results over the last 24 hours. I personally reviewed labs from last 24 hours    Physical Exam   Temp: 98.2  F (36.8  C) Temp src: Oral BP: 127/59 Pulse: 60   Resp: 16 SpO2: 93 % O2 Device: None (Room air)    Vitals:    05/29/23 2000 05/30/23 0545 06/01/23 0700   Weight: 142.2 kg (313 lb 7.9 oz) 142 kg (313 lb 0.9 oz) (!) 150.2 kg (331 lb 2.1 oz)     Vital Signs with Ranges  Temp:  [97.7  F (36.5  C)-98.2  F (36.8  C)] 98.2  F (36.8  C)  Pulse:  [60-88] 60  Resp:  [12-16] 16  BP: (112-127)/(53-69) 127/59  SpO2:  [93 %-96 %] 93 %  I/O last 3 completed shifts:  In: 240 [P.O.:240]  Out: 550 [Urine:550]    Constitutional: Up in chair, " nad  Respiratory: CTAB. Breathing easiliiy   Cardiovascular: RRR no r/g/m  1+BLE symmetric edema    GI: soft, nt nd  Skin/Integumen: no rash, chronic venous stasis changes  Pretibial wounds bandaged  Neuro: nl speech and alert  Psych: nl affect       Medications     lactated ringers 10 mL/hr at 05/30/23 1010       allopurinol  100 mg Oral Daily     atorvastatin  10 mg Oral QPM     gabapentin  100 mg Oral Daily     levothyroxine  100 mcg Oral QAM AC     multivitamin w/minerals  1 tablet Oral Daily     pantoprazole  40 mg Oral QAM AC     rOPINIRole  1 mg Oral At Bedtime     sucralfate  1 g Oral 4x Daily AC & HS     Warfarin Therapy Reminder  1 each Oral See Admin Instructions       Data   Recent Labs   Lab 06/04/23  0724 06/03/23  0716 06/02/23  0657 05/30/23  0519 05/29/23  2204   WBC 5.0 5.4 5.9   < > 9.1   HGB 8.7* 8.9* 9.2*   < > 10.1*   MCV 98 97 97   < > 99   * 131* 118*   < > 109*   INR 1.84* 1.96* 2.23*   < >  --     137 135*   < > 138   POTASSIUM 3.9 4.5 4.5   < > 3.4   CHLORIDE 104 102 102   < > 101   CO2 24 26 23   < > 25   BUN 26.4* 25.3* 27.0*   < > 44.6*   CR 1.35* 1.45* 1.41*   < > 1.86*   ANIONGAP 10 9 10   < > 12   MATTHEW 8.5* 8.9 8.9   < > 8.6*   * 103* 110*   < > 115*   ALBUMIN  --   --   --   --  3.2*   PROTTOTAL  --   --   --   --  6.5   BILITOTAL  --   --   --   --  0.7   ALKPHOS  --   --   --   --  80   ALT  --   --   --   --  12   AST  --   --   --   --  20    < > = values in this interval not displayed.       Imaging:   No results found for this or any previous visit (from the past 24 hour(s)).

## 2023-06-05 ENCOUNTER — APPOINTMENT (OUTPATIENT)
Dept: SPEECH THERAPY | Facility: CLINIC | Age: 88
DRG: 393 | End: 2023-06-05
Attending: INTERNAL MEDICINE
Payer: MEDICARE

## 2023-06-05 ENCOUNTER — APPOINTMENT (OUTPATIENT)
Dept: PHYSICAL THERAPY | Facility: CLINIC | Age: 88
DRG: 393 | End: 2023-06-05
Attending: INTERNAL MEDICINE
Payer: MEDICARE

## 2023-06-05 LAB
ANION GAP SERPL CALCULATED.3IONS-SCNC: 10 MMOL/L (ref 7–15)
BUN SERPL-MCNC: 30.4 MG/DL (ref 8–23)
CALCIUM SERPL-MCNC: 8.6 MG/DL (ref 8.8–10.2)
CHLORIDE SERPL-SCNC: 103 MMOL/L (ref 98–107)
CREAT SERPL-MCNC: 1.33 MG/DL (ref 0.67–1.17)
DEPRECATED HCO3 PLAS-SCNC: 26 MMOL/L (ref 22–29)
ERYTHROCYTE [DISTWIDTH] IN BLOOD BY AUTOMATED COUNT: 15.1 % (ref 10–15)
GFR SERPL CREATININE-BSD FRML MDRD: 52 ML/MIN/1.73M2
GLUCOSE SERPL-MCNC: 102 MG/DL (ref 70–99)
HCT VFR BLD AUTO: 26.4 % (ref 40–53)
HGB BLD-MCNC: 8.5 G/DL (ref 13.3–17.7)
INR PPP: 1.82 (ref 0.85–1.15)
MAGNESIUM SERPL-MCNC: 1.9 MG/DL (ref 1.7–2.3)
MCH RBC QN AUTO: 31.5 PG (ref 26.5–33)
MCHC RBC AUTO-ENTMCNC: 32.2 G/DL (ref 31.5–36.5)
MCV RBC AUTO: 98 FL (ref 78–100)
PHOSPHATE SERPL-MCNC: 3.1 MG/DL (ref 2.5–4.5)
PLATELET # BLD AUTO: 133 10E3/UL (ref 150–450)
POTASSIUM SERPL-SCNC: 3.9 MMOL/L (ref 3.4–5.3)
RBC # BLD AUTO: 2.7 10E6/UL (ref 4.4–5.9)
SODIUM SERPL-SCNC: 139 MMOL/L (ref 136–145)
WBC # BLD AUTO: 4.9 10E3/UL (ref 4–11)

## 2023-06-05 PROCEDURE — 83735 ASSAY OF MAGNESIUM: CPT | Performed by: INTERNAL MEDICINE

## 2023-06-05 PROCEDURE — 250N000013 HC RX MED GY IP 250 OP 250 PS 637: Performed by: INTERNAL MEDICINE

## 2023-06-05 PROCEDURE — 80048 BASIC METABOLIC PNL TOTAL CA: CPT | Performed by: INTERNAL MEDICINE

## 2023-06-05 PROCEDURE — 85610 PROTHROMBIN TIME: CPT | Performed by: INTERNAL MEDICINE

## 2023-06-05 PROCEDURE — 84100 ASSAY OF PHOSPHORUS: CPT | Performed by: INTERNAL MEDICINE

## 2023-06-05 PROCEDURE — 85018 HEMOGLOBIN: CPT | Performed by: INTERNAL MEDICINE

## 2023-06-05 PROCEDURE — 92526 ORAL FUNCTION THERAPY: CPT | Mod: GN | Performed by: SPEECH-LANGUAGE PATHOLOGIST

## 2023-06-05 PROCEDURE — 97116 GAIT TRAINING THERAPY: CPT | Mod: GP

## 2023-06-05 PROCEDURE — 120N000001 HC R&B MED SURG/OB

## 2023-06-05 PROCEDURE — 97530 THERAPEUTIC ACTIVITIES: CPT | Mod: GP

## 2023-06-05 PROCEDURE — 99233 SBSQ HOSP IP/OBS HIGH 50: CPT | Performed by: INTERNAL MEDICINE

## 2023-06-05 PROCEDURE — 36415 COLL VENOUS BLD VENIPUNCTURE: CPT | Performed by: INTERNAL MEDICINE

## 2023-06-05 PROCEDURE — 97110 THERAPEUTIC EXERCISES: CPT | Mod: GP

## 2023-06-05 RX ORDER — BUMETANIDE 1 MG/1
2 TABLET ORAL
Status: DISCONTINUED | OUTPATIENT
Start: 2023-06-05 | End: 2023-06-07 | Stop reason: HOSPADM

## 2023-06-05 RX ORDER — WARFARIN SODIUM 6 MG/1
6 TABLET ORAL
Status: COMPLETED | OUTPATIENT
Start: 2023-06-05 | End: 2023-06-05

## 2023-06-05 RX ORDER — SPIRONOLACTONE 25 MG
12.5 TABLET ORAL DAILY
Status: DISCONTINUED | OUTPATIENT
Start: 2023-06-05 | End: 2023-06-07 | Stop reason: HOSPADM

## 2023-06-05 RX ORDER — DOXYCYCLINE 100 MG/1
100 CAPSULE ORAL 2 TIMES DAILY
Status: DISCONTINUED | OUTPATIENT
Start: 2023-06-05 | End: 2023-06-07 | Stop reason: HOSPADM

## 2023-06-05 RX ORDER — BUMETANIDE 1 MG/1
2 TABLET ORAL ONCE
Status: COMPLETED | OUTPATIENT
Start: 2023-06-05 | End: 2023-06-05

## 2023-06-05 RX ORDER — DOXYCYCLINE 100 MG/1
200 CAPSULE ORAL 2 TIMES DAILY
Status: DISCONTINUED | OUTPATIENT
Start: 2023-06-05 | End: 2023-06-05

## 2023-06-05 RX ADMIN — ROPINIROLE HYDROCHLORIDE 1 MG: 1 TABLET, FILM COATED ORAL at 21:21

## 2023-06-05 RX ADMIN — SUCRALFATE ORAL 1 G: 1 SUSPENSION ORAL at 16:08

## 2023-06-05 RX ADMIN — WARFARIN SODIUM 6 MG: 6 TABLET ORAL at 17:04

## 2023-06-05 RX ADMIN — BUMETANIDE 2 MG: 1 TABLET ORAL at 08:05

## 2023-06-05 RX ADMIN — SUCRALFATE ORAL 1 G: 1 SUSPENSION ORAL at 06:51

## 2023-06-05 RX ADMIN — BUMETANIDE 2 MG: 1 TABLET ORAL at 16:08

## 2023-06-05 RX ADMIN — ALLOPURINOL 100 MG: 100 TABLET ORAL at 08:05

## 2023-06-05 RX ADMIN — SUCRALFATE ORAL 1 G: 1 SUSPENSION ORAL at 21:21

## 2023-06-05 RX ADMIN — LEVOTHYROXINE SODIUM 100 MCG: 100 TABLET ORAL at 06:50

## 2023-06-05 RX ADMIN — GABAPENTIN 100 MG: 100 CAPSULE ORAL at 08:05

## 2023-06-05 RX ADMIN — MULTIPLE VITAMINS W/ MINERALS TAB 1 TABLET: TAB at 16:08

## 2023-06-05 RX ADMIN — SUCRALFATE ORAL 1 G: 1 SUSPENSION ORAL at 11:07

## 2023-06-05 RX ADMIN — BUMETANIDE 2 MG: 1 TABLET ORAL at 22:54

## 2023-06-05 RX ADMIN — ATORVASTATIN CALCIUM 10 MG: 10 TABLET, FILM COATED ORAL at 20:48

## 2023-06-05 RX ADMIN — DOXYCYCLINE HYCLATE 100 MG: 100 CAPSULE ORAL at 12:24

## 2023-06-05 RX ADMIN — Medication 40 MG: at 06:51

## 2023-06-05 ASSESSMENT — ACTIVITIES OF DAILY LIVING (ADL)
ADLS_ACUITY_SCORE: 51
ADLS_ACUITY_SCORE: 48
ADLS_ACUITY_SCORE: 49
ADLS_ACUITY_SCORE: 51
ADLS_ACUITY_SCORE: 48
ADLS_ACUITY_SCORE: 51
ADLS_ACUITY_SCORE: 48
ADLS_ACUITY_SCORE: 48
ADLS_ACUITY_SCORE: 49
ADLS_ACUITY_SCORE: 51

## 2023-06-05 NOTE — PROVIDER NOTIFICATION
Paged Dr. Hanson, during patient assessment it was noted his penis is very edematous (+3), nothing has been documented showing this wanted provider to be aware.

## 2023-06-05 NOTE — PROGRESS NOTES
Pt A&Ox4; VSS on RA. Denies pain or SOB this shift. Ambulated to BR; voiding well and uses urinal as well. A of 1 GB/W. Foam dressings on BLE CDI. IV SL. No swallowing/chewing issues noted this shift. Labs recheck in the a.m. Awaiting TCU placement.

## 2023-06-05 NOTE — PROGRESS NOTES
Pt is AOx4, VSS on RA, denies pain, up with SBA. Patient is continent of BB, had a large loose stool this afternoon. Wound care orders for every other day were completed yesterday 6/4/23 next change is tomorrow 6/5/23. Extensive scrotal edema, educated patient to elevate as much as possible. 1500 ML fluid restriction, patient consumed 1080 since 0700 has 420 ML remaining. All 3 electrolyte protocols, wnl redraws for all 3 scheduled for tomorrow morning. Discharge is pending TCU placement.

## 2023-06-05 NOTE — PROGRESS NOTES
Care Management Follow Up    Length of Stay (days): 7    Expected Discharge Date: 06/05/2023     Concerns to be Addressed:       Patient plan of care discussed at interdisciplinary rounds: Yes    Anticipated Discharge Disposition: Transitional Care     Anticipated Discharge Services:    Anticipated Discharge DME:      Patient/family educated on Medicare website which has current facility and service quality ratings: yes  Education Provided on the Discharge Plan:    Patient/Family in Agreement with the Plan: yes    Referrals Placed by CM/SW: Post Acute Facilities  Private pay costs discussed: Not applicable    Additional Information:  Sw called patient to discuss placement and pending referrals.  Patient would like a shared room and family can transport. Preferred facility [facility] was called and writer left a voice message.     SW following       SHABANA Holly

## 2023-06-05 NOTE — PROGRESS NOTES
Care Management Follow Up    Length of Stay (days): 7    Expected Discharge Date: 06/06/2023     Concerns to be Addressed:       Patient plan of care discussed at interdisciplinary rounds: Yes    Anticipated Discharge Disposition: Transitional Care     Anticipated Discharge Services:    Anticipated Discharge DME:      Patient/family educated on Medicare website which has current facility and service quality ratings: yes  Education Provided on the Discharge Plan:    Patient/Family in Agreement with the Plan: yes    Referrals Placed by CM/SW: Post Acute Facilities  Private pay costs discussed: Not applicable    Additional Information:  Renate received a VM message from Suze at [facility]  who has some questions regarding placement. Suze explained she is out of office until tomorrow.       SHABANA Holly

## 2023-06-05 NOTE — PROGRESS NOTES
[facility]    Hospitalist Progress Note    Assessment & Plan   Chacorta Mendoza is a 87 year old male with history including HTN; CHF; HLD; PAF; AS s/p TAVR; GERD with esophagitis; PPM (leadless); gout; JAZMIN not on CPAP; hypothyroidism; CKD; and obesity; who was initially admitted to OSH 5/28/2023 with food impaction in his esophagus. Subsequently underwent EGD 5/28/2023 with removal with irrigation and lavage. Improved and diet advanced, but at lunch, had symptoms of recurrent food impaction with subsequently development of hypotension requiring pressors. Subsequently transferred to [facility] ICU 5/29/2023 for further management (unclear why repeat endoscopy could not be done at OSH).     EGD performed by Dr. Beck 5/29 with successful advancement of food embolus into the stomach. Pt subsequently improved and was weaned off pressors. Transferred to  Hospitalist service 5/31/2023.        Impacted food bolus, suspect related to esophageal dysmotility - s/p EGD treatment 5/28/2023 (at OSH) with recurrent bolus and repeat 5/29/2023 (at [facility]).  Suspected Candida esophagitis.  Severe grade LA D esophagitis (previously diagnosed).  Dysphagia related to above.  * Noted that EGD report in the [facility] system 3/2023 showed grade D esophagitis but no stricture. No apparent h/o dilatation. Noted that pt did not follow the antireflux regimen recommended at that time.  * Initial presentation as above. EGD 5/29 showed dilation of the esophagus; clotted blood in the middle third of the esophagus; good in the esophagus; patulous lower esophageal sphincter; food (residue) in the stomach; noted successful advancement of food embolus into the stomach.   * GI felt this was a motility problem and not stricture; also concern for Candida esophagitis and recommended sitting bolt upright while eating and eating slowly. Started on fluconazole on admit. SLP consulted on admit.  -no complaints. Swallowing fine.  Brought up some clear sputum today. No gerd sxs. No abd pain. No rn concerns  -completed course fluconazole 5/29 to 6/4    No concerns again today. No gi sxs.     Plan:   - Continue pantoprazole daily and sucralfate QID.  - Continue current diet: Soft & Bite Sized Diet (level 7) Thin Liquids (level 0)    - Advance diet as able per SLP.  - Patient should continue to sit bolt upright while eating and eat slowly.  - Continue aspiration precautions.  -outpatient GI follow up - [facility]     Aspiration pneumonia.  Tick bite with target lesion (left forearm), concern for Lyme disease.  * Initial presentation as above. CXR at OSH 5/28 showed patchy bibasilar opacities; mild bilateral vascular and interstitial prominence that could be a mild degree of edema. Treated with azithromycin and ceftriaxone at OSH. Started on pressors at OSH.  * Continued on pressors in ICU (see below). Started on empiric pipericillin-tazobactam on admit 5/29.  * Doxycycline started 5/30 after MD was notified that pt had a tick removed from his left forearm a few day PTA and on exam he did appear to have a target lesion at the site. No other signs/sxs of Lyme disease noted.  * Sats remained stable in 90's on RA on 6/1. Pipericillin-tazobactam stopped and ceftriaxone started 6/1.  -completed 7 days iv abx in hospital   -lungs sound clear. Off oxygen     Plan;   -   - Continue doxycyline (started 5/30) for tick bite with rash - plan 10 day course last dose 6/9 pm     Hypotension, suspect multifactorial related to hypovolemia, question septic shock component.  * Initial presentation as above. Given IVF's at OSH. Required pressors at OSH prior to transfer.  * Noted with mild hypotension requiring low level pressors in ICU, not clear that this was related to infection/sepsis; subsequently weaned off. Started on midodrine in the ICU.  * BP's improved 6/2. Pt did note that sacubitril-valsartan recently started PTA and pt had dizziness with  this.  - Trial of stopping midodrine 2.5 mg TID started 6/2  -has been normotensive (low nl sbp)since stopping midodrine  - Continue to hold multiple PTA BP meds.     Low HR readings, suspect inaccurate/spurious readings related to PVC's.  CHF (HFrEF).  Paroxysmal atrial fibrillation.  Aortic stenossi s/p TAVR (2019).  PPM (leadless, 2019).  H/o hypertension (benign essential).  [PTA BP meds/diuretics: bumetanide 2 mg BID; sacubitril-valsartan 24-26 mg 0.5 tablets BID; spironolactone 0.5 mg daily.]  * PTA meds held on admit.  * Echo 5/31 showed LVEF 35-40% with WMA (noted LVEF 35-40% with similar WMA in 10/2022); RV systolic function moderately reduced; TAVR with gradients borderline mildly increased; technically difficult.  * On 6/2, noted with low HR reading in 30's, asymptomatic. ECG showed paced rhythm with PVC's. Overall, suspect low HR readings related to PVC's.  Pt did note that sacubitril-valsartan recently started PTA and pt had dizziness with this.           Recent Labs   Lab 06/02/23  0657 06/01/23  0512 05/31/23  0533 05/30/23  0519 05/29/23  0520 05/28/23  0709   INR 2.23* 2.39* 2.54* 2.96* 2.94* 2.30*     bp up to 111- 120s/-   Restarted bumex 6/4 and bp tolerate  Wt is up to 150kg from admission wt of 142 kg  No chf  Started on PTA dose bumex 2mg bid. On 6/5  Good UO. I/O +1.1L improved    Plan;   1500cc fluid restriction started.   -daily wt  -restart bumex at PTA garcia of 2mg bid.   - Continue to hold PTA  sacubitril-valsartan, spironolactone for now given recent hypotension.  - Cautiously restart meds if BP's remain stable or elevated and if renal function stable/improved outpatient  - Continue warfarin per Pharm protocol.  - Continue to monitor HR's and can pursue further workup if low HR readings are associated with symptoms; but doubt this will be necessary as pt has a PPM in place.  - Monitor i/o's, daily wts.  -follow sbp     -      MELIZA, suspect prerenal, on CKD.  * Cr at baseline appears to be  between 1.5 and 2.0.   * Cr 1.92 on admit to OSH 5/28.  * Cr subsequently improved with volume/pressors.           Recent Labs   Lab 06/02/23  0657 06/01/23  0512 05/31/23  0533 05/30/23  0519 05/29/23 2204 05/29/23  0520   CR 1.41* 1.46* 1.63* 1.82* 1.86* 1.69*     Stable creatinine at 1.3 on 6/5.   Plan;   - Continue to hold PTA diuretics--> restart bumex 2mg X 1 today and reeval tomorrow  - Continue to monitor BMP, recheck several day and at tcu  - Avoid nephrotoxic medications.     Anemia, suspect chronic component.  * Hgb 10.6 on admit to OSH 5/28. No overt clinical signs of major bleeding on admit.           Recent Labs   Lab 06/02/23  0657 06/01/23  0512 05/31/23  0533 05/30/23  0519 05/29/23 2204 05/29/23  0520   HGB 9.2* 8.9* 9.9* 10.6* 10.1* 9.9*   Repeat Hb of 8.9 on 6/3.     - Continue to monitor CBC at tcu     Thrombocytopenia, unclear etiology, possibly medication effect or chronic from other cause.  * Noted pt has had low plts in the past.  * Plts 126K on admit to OSH 5/28.           Recent Labs   Lab 06/02/23  0657 06/01/23  0512 05/31/23  0533 05/30/23  0519 05/29/23 2204 05/29/23  0520   * 98* 117* 116* 109* 112*   Platelet count 131 on 6/3. Improving and now stable at 131  -slightly nodular contour to liver in 3/2022 but nl spleen  - noted to be 130 range outside facility in 3/2023.   - Continue to monitor CBC at tcu  -if remains low outpatient then should check folate, tsh, B12 level and peripheral smear     Hypothyroidism.  - Continue levothyroxine.     Gout.  - Continue allopurinol.     Weakness and physical deconditioning due to multiple acute and chronic medical issues.  - Continue PT and OT.  -TCU placement      chronic venous stasis changes   wound care rn consulted  Wound cares in place  No superimposed infection   Cont current wound cares        Clinically Significant Risk Factors              # Hypoalbuminemia: Lowest albumin = 3.2 g/dL at 5/29/2023 10:04 PM, will monitor as  "appropriate   # Thrombocytopenia: Lowest platelets = 98 in last 2 days, will monitor for bleeding   # Hypertension: Noted on problem list  # Chronic heart failure with reduced ejection fraction: last echo with EF <40%       # Severe Obesity: Estimated body mass index is 43.69 kg/m  as calculated from the following:    Height as of 5/28/23: 1.854 m (6' 1\").    Weight as of this encounter: 150.2 kg (331 lb 2.1 oz)., PRESENT ON ADMISSION                 COVID-19 testing.          COVID-19 PCR Results         8/31/2021    15:38 11/24/2021    11:00 5/18/2022    18:33 10/3/2022    22:55 5/28/2023    07:12   COVID-19 PCR Results   COVID-19 Virus by PCR (External Result) Positive      Negative      Positive      Negative          SARS CoV2 PCR         Negative              This result is from an external source.       COVID-19 Antibody Results, Testing for Immunity           No data to display                   Diet: Soft & Bite Sized Diet (level 6) Thin Liquids (level 0)    Prophylaxis: PCD's, ambulation. On warfarin.  Kimball Catheter: Not present  Lines: None     Code Status: Full Code        Disposition Plan  Expected discharge: pt will need tcu, awaiting placement sw consulted      No discharge today as diuresing       Yayo Hanson MD, MD  Text Page  (7am to 6pm)  Interval History    Wt down   good UO  No dysphagia or gi symptoms  No gerd.     -Data reviewed today: I reviewed all new labs and imaging results over the last 24 hours. I personally reviewed labs from last 24 hours    Physical Exam   Temp: 97.7  F (36.5  C) Temp src: Oral BP: 111/58 Pulse: 62   Resp: 16 SpO2: 95 % O2 Device: None (Room air)    Vitals:    06/01/23 0700 06/04/23 1926 06/05/23 0700   Weight: (!) 150.2 kg (331 lb 2.1 oz) (!) 154.2 kg (339 lb 15.2 oz) (!) 152.9 kg (337 lb 1.3 oz)     Vital Signs with Ranges  Temp:  [97.6  F (36.4  C)-97.9  F (36.6  C)] 97.7  F (36.5  C)  Pulse:  [50-62] 62  Resp:  [16] 16  BP: (111-117)/(54-58) 111/58  SpO2: "  [95 %-97 %] 95 %  I/O last 3 completed shifts:  In: -   Out: 1075 [Urine:1075]    Constitutional: Up in chair, nad  Neck; nl jvp  Respiratory: CTAB. Breathing easiliiy   Cardiovascular: RRR no r/g/m  1+BLE symmetric edema    GI: soft, nt nd  . Scrotal edema  Skin/Integumen: no rash, chronic venous stasis changes  Pretibial wounds bandaged  Neuro: nl speech and alert  Psych: nl affect       Medications     lactated ringers 10 mL/hr at 05/30/23 1010       allopurinol  100 mg Oral Daily     atorvastatin  10 mg Oral QPM     bumetanide  2 mg Oral BID     gabapentin  100 mg Oral Daily     levothyroxine  100 mcg Oral QAM AC     multivitamin w/minerals  1 tablet Oral Daily     pantoprazole  40 mg Oral QAM AC     rOPINIRole  1 mg Oral At Bedtime     sucralfate  1 g Oral 4x Daily AC & HS     Warfarin Therapy Reminder  1 each Oral See Admin Instructions       Data   Recent Labs   Lab 06/05/23  0636 06/04/23  0724 06/03/23  0716 05/30/23  0519 05/29/23  2204   WBC 4.9 5.0 5.4   < > 9.1   HGB 8.5* 8.7* 8.9*   < > 10.1*   MCV 98 98 97   < > 99   * 131* 131*   < > 109*   INR 1.82* 1.84* 1.96*   < >  --     138 137   < > 138   POTASSIUM 3.9 3.9 4.5   < > 3.4   CHLORIDE 103 104 102   < > 101   CO2 26 24 26   < > 25   BUN 30.4* 26.4* 25.3*   < > 44.6*   CR 1.33* 1.35* 1.45*   < > 1.86*   ANIONGAP 10 10 9   < > 12   MATTHEW 8.6* 8.5* 8.9   < > 8.6*   * 100* 103*   < > 115*   ALBUMIN  --   --   --   --  3.2*   PROTTOTAL  --   --   --   --  6.5   BILITOTAL  --   --   --   --  0.7   ALKPHOS  --   --   --   --  80   ALT  --   --   --   --  12   AST  --   --   --   --  20    < > = values in this interval not displayed.       Imaging:   No results found for this or any previous visit (from the past 24 hour(s)).

## 2023-06-06 ENCOUNTER — APPOINTMENT (OUTPATIENT)
Dept: SPEECH THERAPY | Facility: CLINIC | Age: 88
DRG: 393 | End: 2023-06-06
Attending: INTERNAL MEDICINE
Payer: MEDICARE

## 2023-06-06 LAB
ANION GAP SERPL CALCULATED.3IONS-SCNC: 14 MMOL/L (ref 7–15)
ATRIAL RATE - MUSE: 68 BPM
BUN SERPL-MCNC: 32.5 MG/DL (ref 8–23)
CALCIUM SERPL-MCNC: 8.9 MG/DL (ref 8.8–10.2)
CHLORIDE SERPL-SCNC: 104 MMOL/L (ref 98–107)
CREAT SERPL-MCNC: 1.47 MG/DL (ref 0.67–1.17)
DEPRECATED HCO3 PLAS-SCNC: 25 MMOL/L (ref 22–29)
DIASTOLIC BLOOD PRESSURE - MUSE: NORMAL MMHG
ERYTHROCYTE [DISTWIDTH] IN BLOOD BY AUTOMATED COUNT: 14.9 % (ref 10–15)
GFR SERPL CREATININE-BSD FRML MDRD: 46 ML/MIN/1.73M2
GLUCOSE SERPL-MCNC: 99 MG/DL (ref 70–99)
HCT VFR BLD AUTO: 28.8 % (ref 40–53)
HGB BLD-MCNC: 9.2 G/DL (ref 13.3–17.7)
INR PPP: 1.82 (ref 0.85–1.15)
INTERPRETATION ECG - MUSE: NORMAL
MAGNESIUM SERPL-MCNC: 1.8 MG/DL (ref 1.7–2.3)
MCH RBC QN AUTO: 31.2 PG (ref 26.5–33)
MCHC RBC AUTO-ENTMCNC: 31.9 G/DL (ref 31.5–36.5)
MCV RBC AUTO: 98 FL (ref 78–100)
P AXIS - MUSE: NORMAL DEGREES
PHOSPHATE SERPL-MCNC: 3.1 MG/DL (ref 2.5–4.5)
PLATELET # BLD AUTO: 148 10E3/UL (ref 150–450)
POTASSIUM SERPL-SCNC: 3.8 MMOL/L (ref 3.4–5.3)
PR INTERVAL - MUSE: NORMAL MS
QRS DURATION - MUSE: 194 MS
QT - MUSE: 514 MS
QTC - MUSE: 546 MS
R AXIS - MUSE: 122 DEGREES
RBC # BLD AUTO: 2.95 10E6/UL (ref 4.4–5.9)
SODIUM SERPL-SCNC: 143 MMOL/L (ref 136–145)
SYSTOLIC BLOOD PRESSURE - MUSE: NORMAL MMHG
T AXIS - MUSE: -74 DEGREES
VENTRICULAR RATE- MUSE: 68 BPM
WBC # BLD AUTO: 5.2 10E3/UL (ref 4–11)

## 2023-06-06 PROCEDURE — 250N000013 HC RX MED GY IP 250 OP 250 PS 637: Performed by: INTERNAL MEDICINE

## 2023-06-06 PROCEDURE — 85027 COMPLETE CBC AUTOMATED: CPT | Performed by: INTERNAL MEDICINE

## 2023-06-06 PROCEDURE — 84100 ASSAY OF PHOSPHORUS: CPT | Performed by: INTERNAL MEDICINE

## 2023-06-06 PROCEDURE — 82310 ASSAY OF CALCIUM: CPT | Performed by: INTERNAL MEDICINE

## 2023-06-06 PROCEDURE — 36415 COLL VENOUS BLD VENIPUNCTURE: CPT | Performed by: INTERNAL MEDICINE

## 2023-06-06 PROCEDURE — 99233 SBSQ HOSP IP/OBS HIGH 50: CPT | Performed by: INTERNAL MEDICINE

## 2023-06-06 PROCEDURE — 83735 ASSAY OF MAGNESIUM: CPT | Performed by: INTERNAL MEDICINE

## 2023-06-06 PROCEDURE — 85610 PROTHROMBIN TIME: CPT | Performed by: INTERNAL MEDICINE

## 2023-06-06 PROCEDURE — 92526 ORAL FUNCTION THERAPY: CPT | Mod: GN

## 2023-06-06 PROCEDURE — 120N000001 HC R&B MED SURG/OB

## 2023-06-06 RX ORDER — POTASSIUM CHLORIDE 1500 MG/1
20 TABLET, EXTENDED RELEASE ORAL ONCE
Status: COMPLETED | OUTPATIENT
Start: 2023-06-06 | End: 2023-06-06

## 2023-06-06 RX ADMIN — POTASSIUM CHLORIDE 20 MEQ: 1500 TABLET, EXTENDED RELEASE ORAL at 13:01

## 2023-06-06 RX ADMIN — WARFARIN SODIUM 7.5 MG: 5 TABLET ORAL at 17:52

## 2023-06-06 RX ADMIN — SUCRALFATE ORAL 1 G: 1 SUSPENSION ORAL at 16:53

## 2023-06-06 RX ADMIN — DOXYCYCLINE HYCLATE 100 MG: 100 CAPSULE ORAL at 13:01

## 2023-06-06 RX ADMIN — SACUBITRIL AND VALSARTAN 1 TABLET: 24; 26 TABLET, FILM COATED ORAL at 13:01

## 2023-06-06 RX ADMIN — SACUBITRIL AND VALSARTAN 1 TABLET: 24; 26 TABLET, FILM COATED ORAL at 21:04

## 2023-06-06 RX ADMIN — SUCRALFATE ORAL 1 G: 1 SUSPENSION ORAL at 07:07

## 2023-06-06 RX ADMIN — GABAPENTIN 100 MG: 100 CAPSULE ORAL at 09:33

## 2023-06-06 RX ADMIN — SUCRALFATE ORAL 1 G: 1 SUSPENSION ORAL at 21:53

## 2023-06-06 RX ADMIN — DOXYCYCLINE HYCLATE 100 MG: 100 CAPSULE ORAL at 09:33

## 2023-06-06 RX ADMIN — ATORVASTATIN CALCIUM 10 MG: 10 TABLET, FILM COATED ORAL at 20:15

## 2023-06-06 RX ADMIN — BUMETANIDE 2 MG: 1 TABLET ORAL at 16:54

## 2023-06-06 RX ADMIN — MULTIPLE VITAMINS W/ MINERALS TAB 1 TABLET: TAB at 16:54

## 2023-06-06 RX ADMIN — SPIRONOLACTONE 12.5 MG: 25 TABLET ORAL at 20:15

## 2023-06-06 RX ADMIN — BUMETANIDE 2 MG: 1 TABLET ORAL at 07:07

## 2023-06-06 RX ADMIN — ROPINIROLE HYDROCHLORIDE 1 MG: 1 TABLET, FILM COATED ORAL at 21:52

## 2023-06-06 RX ADMIN — ALLOPURINOL 100 MG: 100 TABLET ORAL at 09:33

## 2023-06-06 RX ADMIN — LEVOTHYROXINE SODIUM 100 MCG: 100 TABLET ORAL at 06:35

## 2023-06-06 ASSESSMENT — ACTIVITIES OF DAILY LIVING (ADL)
ADLS_ACUITY_SCORE: 51
ADLS_ACUITY_SCORE: 47
ADLS_ACUITY_SCORE: 51
ADLS_ACUITY_SCORE: 47
ADLS_ACUITY_SCORE: 51
ADLS_ACUITY_SCORE: 47
ADLS_ACUITY_SCORE: 51

## 2023-06-06 NOTE — PROGRESS NOTES
[facility]    Hospitalist Progress Note    Assessment & Plan   Chacorta Mendoza is a 87 year old male with history including HTN; CHF; HLD; PAF; AS s/p TAVR; GERD with esophagitis; PPM (leadless); gout; JAZMIN not on CPAP; hypothyroidism; CKD; and obesity; who was initially admitted to OSH 5/28/2023 with food impaction in his esophagus. Subsequently underwent EGD 5/28/2023 with removal with irrigation and lavage. Improved and diet advanced, but at lunch, had symptoms of recurrent food impaction with subsequently development of hypotension requiring pressors. Subsequently transferred to [facility] ICU 5/29/2023 for further management (unclear why repeat endoscopy could not be done at OSH).     EGD performed by Dr. Beck 5/29 with successful advancement of food embolus into the stomach. Pt subsequently improved and was weaned off pressors. Transferred to  Hospitalist service 5/31/2023.        Impacted food bolus, suspect related to esophageal dysmotility - s/p EGD treatment 5/28/2023 (at OSH) with recurrent bolus and repeat 5/29/2023 (at [facility]).  Suspected Candida esophagitis.  Severe grade LA D esophagitis (previously diagnosed).  Dysphagia related to above.  * Noted that EGD report in the [facility] system 3/2023 showed grade D esophagitis but no stricture. No apparent h/o dilatation. Noted that pt did not follow the antireflux regimen recommended at that time.  * Initial presentation as above. EGD 5/29 showed dilation of the esophagus; clotted blood in the middle third of the esophagus; good in the esophagus; patulous lower esophageal sphincter; food (residue) in the stomach; noted successful advancement of food embolus into the stomach.   * GI felt this was a motility problem and not stricture; also concern for Candida esophagitis and recommended sitting bolt upright while eating and eating slowly. Started on fluconazole on admit. SLP consulted on admit.  -no complaints. Swallowing fine.  Brought up some clear sputum today. No gerd sxs. No abd pain. No rn concerns  -completed course fluconazole 5/29 to 6/4    No concerns again today. No gi sxs.     Plan:   - Continue pantoprazole daily and sucralfate QID.  - Continue current diet: Soft & Bite Sized Diet (level 7) Thin Liquids (level 0)    - Advance diet as able per SLP.  - Patient should continue to sit bolt upright while eating and eat slowly.  - Continue aspiration precautions.  -outpatient GI follow up - [facility]     Aspiration pneumonia.  Tick bite with target lesion (left forearm), concern for Lyme disease.  * Initial presentation as above. CXR at OSH 5/28 showed patchy bibasilar opacities; mild bilateral vascular and interstitial prominence that could be a mild degree of edema. Treated with azithromycin and ceftriaxone at OSH. Started on pressors at OSH.  * Continued on pressors in ICU (see below). Started on empiric pipericillin-tazobactam on admit 5/29.  * Doxycycline started 5/30 after MD was notified that pt had a tick removed from his left forearm a few day PTA and on exam he did appear to have a target lesion at the site. No other signs/sxs of Lyme disease noted.  * Sats remained stable in 90's on RA on 6/1. Pipericillin-tazobactam stopped and ceftriaxone started 6/1.  -completed 7 days iv abx in hospital   -lungs sound clear. Off oxygen     Plan;   - Continue doxycyline 100mg bid (started 5/30) for tick bite with rash - plan 10 day course last dose 6/9 pm     Hypotension, suspect multifactorial related to hypovolemia, question septic shock component.  resolved  * Initial presentation as above. Given IVF's at OSH. Required pressors at OSH prior to transfer.  * Noted with mild hypotension requiring low level pressors in ICU, not clear that this was related to infection/sepsis; subsequently weaned off. Started on midodrine in the ICU.  * BP's improved 6/2. Pt did note that sacubitril-valsartan recently started PTA and pt had  dizziness with this.  - Trial of stopping midodrine 2.5 mg TID started 6/2  -has been normotensive (low nl sbp)since stopping midodrine  - held multiple PTA BP meds.  -began to restart cardiac meds on 6/5 given normotension. Tolerating      Low HR readings, suspect inaccurate/spurious readings related to PVC's.  CHF (HFrEF).  Paroxysmal atrial fibrillation.  Aortic stenossi s/p TAVR (2019).  PPM (leadless, 2019).  H/o hypertension (benign essential).  [PTA BP meds/diuretics: bumetanide 2 mg BID; sacubitril-valsartan 24-26 mg 0.5 tablets BID; spironolactone 0.5 mg daily.]  * PTA meds held on admit.  * Echo 5/31 showed LVEF 35-40% with WMA (noted LVEF 35-40% with similar WMA in 10/2022); RV systolic function moderately reduced; TAVR with gradients borderline mildly increased; technically difficult.  * On 6/2, noted with low HR reading in 30's, asymptomatic. ECG showed paced rhythm with PVC's. Overall, suspect low HR readings related to PVC's.  Pt did note that sacubitril-valsartan recently started PTA and pt had dizziness with this.           Recent Labs   Lab 06/02/23  0657 06/01/23  0512 05/31/23  0533 05/30/23  0519 05/29/23  0520 05/28/23  0709   INR 2.23* 2.39* 2.54* 2.96* 2.94* 2.30*     bp up to 111- 120s/-   Restarted bumex 6/4 and bp tolerate  Wt is up to 150kg from admission wt of 142 kg ? Accurage. 152kg today,   No chf  Started on PTA dose bumex 2mg bid. On 6/5  Good UO. I/O now at -1.1 L neg fluid balance    Plan;   -check standing wt   - high K protocol  1500cc fluid restriction started.   -daily wt  -restart bumex at PTA garcia of 2mg bid. May need extra dose this pm  - restart PTA  sacubitril-valsartan, spironolactone for now given recent hypotension.  - Cautiously restart meds if BP's remain stable or elevated and if renal function stable/improved outpatient  - Continue warfarin per Pharm protocol.  - Continue to monitor HR's and can pursue further workup if low HR readings are associated with symptoms;  but doubt this will be necessary as pt has a PPM in place.  - Monitor i/o's, daily wts.  -follow sbp     -      MELIZA, suspect prerenal, on CKD.  * Cr at baseline appears to be between 1.5 and 2.0.   * Cr 1.92 on admit to OSH 5/28.  * Cr subsequently improved with volume/pressors.           Recent Labs   Lab 06/02/23  0657 06/01/23  0512 05/31/23  0533 05/30/23  0519 05/29/23 2204 05/29/23  0520   CR 1.41* 1.46* 1.63* 1.82* 1.86* 1.69*     Stable creatinine at 1.3 on 6/5.   Up to 1.4 today.    Plan;   -  restarted PTA bumex 2mg bid  - Continue to monitor BMP, recheck several day and at tcu  - Avoid nephrotoxic medications.  -restarted entresto   - am bmp     Anemia, suspect chronic component.  * Hgb 10.6 on admit to OSH 5/28. No overt clinical signs of major bleeding on admit.           Recent Labs   Lab 06/02/23  0657 06/01/23  0512 05/31/23  0533 05/30/23  0519 05/29/23 2204 05/29/23  0520   HGB 9.2* 8.9* 9.9* 10.6* 10.1* 9.9*   Hb stable 8-9 range    - Continue to monitor CBC at tcu     Thrombocytopenia, unclear etiology, possibly medication effect or chronic from other cause.  * Noted pt has had low plts in the past.  * Plts 126K on admit to OSH 5/28.           Recent Labs   Lab 06/02/23  0657 06/01/23  0512 05/31/23  0533 05/30/23  0519 05/29/23 2204 05/29/23  0520   * 98* 117* 116* 109* 112*   Platelet count 131 on 6/3. Improving and now stable at 131  -slightly nodular contour to liver in 3/2022 but nl spleen  - noted to be 130 range outside facility in 3/2023.   -follow up level 148.   - Continue to monitor CBC at tcu  -if remains low outpatient then should check folate, tsh, B12 level and peripheral smear at NH     Hypothyroidism.  - Continue levothyroxine.     Gout.  - Continue allopurinol.     Weakness and physical deconditioning due to multiple acute and chronic medical issues.  - Continue PT and OT.  -TCU placement      chronic venous stasis changes   wound care rn consulted  Wound cares in  "place  No superimposed infection   Cont current wound cares        Clinically Significant Risk Factors              # Hypoalbuminemia: Lowest albumin = 3.2 g/dL at 5/29/2023 10:04 PM, will monitor as appropriate   # Thrombocytopenia: Lowest platelets = 98 in last 2 days, will monitor for bleeding   # Hypertension: Noted on problem list  # Chronic heart failure with reduced ejection fraction: last echo with EF <40%       # Severe Obesity: Estimated body mass index is 43.69 kg/m  as calculated from the following:    Height as of 5/28/23: 1.854 m (6' 1\").    Weight as of this encounter: 150.2 kg (331 lb 2.1 oz)., PRESENT ON ADMISSION                 COVID-19 testing.          COVID-19 PCR Results         8/31/2021    15:38 11/24/2021    11:00 5/18/2022    18:33 10/3/2022    22:55 5/28/2023    07:12   COVID-19 PCR Results   COVID-19 Virus by PCR (External Result) Positive      Negative      Positive      Negative          SARS CoV2 PCR         Negative              This result is from an external source.       COVID-19 Antibody Results, Testing for Immunity           No data to display                   Diet: Soft & Bite Sized Diet (level 6) Thin Liquids (level 0)    Prophylaxis: PCD's, ambulation. On warfarin.  Kimball Catheter: Not present  Lines: None     Code Status: Full Code        Disposition Plan  Expected discharge: pt will need tcu, awaiting placement sw consulted      No discharge today as diuresing. Updated SW    Anticipate discharge tomorrow if tolerate medds    Discussed care plan with pt and rn  Yayo Hanson MD, MD  Text Page  (7am to 6pm)  Interval History     good UO  No dysphagia or gi symptoms  No gerd.   No sob. No cp     -Data reviewed today: I reviewed all new labs and imaging results over the last 24 hours. I personally reviewed labs from last 24 hours    Physical Exam   Temp: 98  F (36.7  C) Temp src: Oral BP: 108/55 Pulse: 84   Resp: 16 SpO2: 95 % O2 Device: None (Room air)    Vitals:    " 06/05/23 0700 06/06/23 0700 06/06/23 0920   Weight: (!) 152.9 kg (337 lb 1.3 oz) 150 kg (330 lb 11 oz) (!) 152.9 kg (337 lb)     Vital Signs with Ranges  Temp:  [97.9  F (36.6  C)-98.2  F (36.8  C)] 98  F (36.7  C)  Pulse:  [78-96] 84  Resp:  [16-18] 16  BP: (108-132)/(54-74) 108/55  SpO2:  [95 %-98 %] 95 %  I/O last 3 completed shifts:  In: 1320 [P.O.:1320]  Out: 3800 [Urine:3800]    Constitutional: Up in chair, nad  Neck; nl jvp  Respiratory: CTAB. Breathing easiliiy   Cardiovascular: RRR no r/g/m  1+BLE symmetric edema- seems better trace now    GI: soft, nt nd  . Scrotal edema- stable.   Skin/Integumen: no rash, chronic venous stasis changes  Pretibial wounds bandaged  Neuro: nl speech and alert  Psych: nl affect       Medications       allopurinol  100 mg Oral Daily     atorvastatin  10 mg Oral QPM     bumetanide  2 mg Oral BID     doxycycline hyclate  100 mg Oral BID     gabapentin  100 mg Oral Daily     levothyroxine  100 mcg Oral QAM AC     multivitamin w/minerals  1 tablet Oral Daily     pantoprazole  40 mg Oral QAM AC     potassium chloride  20 mEq Oral Once     rOPINIRole  1 mg Oral At Bedtime     sacubitril-valsartan  1 tablet Oral BID     spironolactone  12.5 mg Oral Daily     sucralfate  1 g Oral 4x Daily AC & HS     Warfarin Therapy Reminder  1 each Oral See Admin Instructions       Data   Recent Labs   Lab 06/06/23  0758 06/05/23  0636 06/04/23  0724   WBC 5.2 4.9 5.0   HGB 9.2* 8.5* 8.7*   MCV 98 98 98   * 133* 131*   INR 1.82* 1.82* 1.84*    139 138   POTASSIUM 3.8 3.9 3.9   CHLORIDE 104 103 104   CO2 25 26 24   BUN 32.5* 30.4* 26.4*   CR 1.47* 1.33* 1.35*   ANIONGAP 14 10 10   MATTHEW 8.9 8.6* 8.5*   GLC 99 102* 100*       Imaging:   No results found for this or any previous visit (from the past 24 hour(s)).

## 2023-06-06 NOTE — PLAN OF CARE
Goal Outcome Evaluation:    Pt A&Ox4, VSs on room air. Up with assist of 1 with gait belt and walker. Scrotum edema, voiding frequently  per urinal with assist of 1. On 1500 fluid restriction. Denies pain. Pending discharge.

## 2023-06-06 NOTE — PROGRESS NOTES
Pt is AOx4, VSS on RA, denies pain, up with SBA. Patient is continent of BB.  Wound care orders for every other day were completed this shift, dressing change due 6/8/23. Extensive scrotal edema, educated patient to elevate as much as possible. 1500 ML fluid restriction. All 3 electrolyte protocols, wnl redraws for all 3 scheduled for tomorrow morning. Discharge is pending TCU placement and ability to diurese.

## 2023-06-06 NOTE — PLAN OF CARE
Goal Outcome Evaluation:  Denies CP. Scrotum elevated on wash cloth per order. All pt needs met at this time. BLE elevated by pillows. Possible discharge tomorrow per note.

## 2023-06-07 ENCOUNTER — APPOINTMENT (OUTPATIENT)
Dept: PHYSICAL THERAPY | Facility: CLINIC | Age: 88
DRG: 393 | End: 2023-06-07
Attending: INTERNAL MEDICINE
Payer: MEDICARE

## 2023-06-07 VITALS
TEMPERATURE: 97 F | RESPIRATION RATE: 18 BRPM | OXYGEN SATURATION: 97 % | DIASTOLIC BLOOD PRESSURE: 52 MMHG | HEART RATE: 62 BPM | BODY MASS INDEX: 42.3 KG/M2 | WEIGHT: 315 LBS | SYSTOLIC BLOOD PRESSURE: 107 MMHG

## 2023-06-07 LAB
ANION GAP SERPL CALCULATED.3IONS-SCNC: 11 MMOL/L (ref 7–15)
BUN SERPL-MCNC: 30.1 MG/DL (ref 8–23)
CALCIUM SERPL-MCNC: 8.4 MG/DL (ref 8.8–10.2)
CHLORIDE SERPL-SCNC: 102 MMOL/L (ref 98–107)
CREAT SERPL-MCNC: 1.33 MG/DL (ref 0.67–1.17)
DEPRECATED HCO3 PLAS-SCNC: 29 MMOL/L (ref 22–29)
ERYTHROCYTE [DISTWIDTH] IN BLOOD BY AUTOMATED COUNT: 14.7 % (ref 10–15)
GFR SERPL CREATININE-BSD FRML MDRD: 52 ML/MIN/1.73M2
GLUCOSE SERPL-MCNC: 102 MG/DL (ref 70–99)
HCT VFR BLD AUTO: 28.6 % (ref 40–53)
HGB BLD-MCNC: 9.1 G/DL (ref 13.3–17.7)
INR PPP: 1.88 (ref 0.85–1.15)
MAGNESIUM SERPL-MCNC: 1.7 MG/DL (ref 1.7–2.3)
MCH RBC QN AUTO: 30.6 PG (ref 26.5–33)
MCHC RBC AUTO-ENTMCNC: 31.8 G/DL (ref 31.5–36.5)
MCV RBC AUTO: 96 FL (ref 78–100)
PHOSPHATE SERPL-MCNC: 2.8 MG/DL (ref 2.5–4.5)
PLATELET # BLD AUTO: 173 10E3/UL (ref 150–450)
POTASSIUM SERPL-SCNC: 3.3 MMOL/L (ref 3.4–5.3)
RBC # BLD AUTO: 2.97 10E6/UL (ref 4.4–5.9)
SODIUM SERPL-SCNC: 142 MMOL/L (ref 136–145)
WBC # BLD AUTO: 5.2 10E3/UL (ref 4–11)

## 2023-06-07 PROCEDURE — G0463 HOSPITAL OUTPT CLINIC VISIT: HCPCS

## 2023-06-07 PROCEDURE — 250N000013 HC RX MED GY IP 250 OP 250 PS 637: Performed by: INTERNAL MEDICINE

## 2023-06-07 PROCEDURE — 85610 PROTHROMBIN TIME: CPT | Performed by: INTERNAL MEDICINE

## 2023-06-07 PROCEDURE — 84100 ASSAY OF PHOSPHORUS: CPT | Performed by: INTERNAL MEDICINE

## 2023-06-07 PROCEDURE — 83735 ASSAY OF MAGNESIUM: CPT | Performed by: INTERNAL MEDICINE

## 2023-06-07 PROCEDURE — 97110 THERAPEUTIC EXERCISES: CPT | Mod: GP

## 2023-06-07 PROCEDURE — 97116 GAIT TRAINING THERAPY: CPT | Mod: GP

## 2023-06-07 PROCEDURE — 82310 ASSAY OF CALCIUM: CPT | Performed by: INTERNAL MEDICINE

## 2023-06-07 PROCEDURE — 85014 HEMATOCRIT: CPT | Performed by: INTERNAL MEDICINE

## 2023-06-07 PROCEDURE — 36415 COLL VENOUS BLD VENIPUNCTURE: CPT | Performed by: INTERNAL MEDICINE

## 2023-06-07 PROCEDURE — 99239 HOSP IP/OBS DSCHRG MGMT >30: CPT | Performed by: INTERNAL MEDICINE

## 2023-06-07 RX ORDER — WARFARIN SODIUM 6 MG/1
6 TABLET ORAL ONCE
Qty: 1 TABLET | Refills: 0 | DISCHARGE
Start: 2023-06-07 | End: 2023-06-07

## 2023-06-07 RX ORDER — WARFARIN SODIUM 4 MG/1
4 TABLET ORAL DAILY
DISCHARGE
Start: 2023-06-08 | End: 2024-02-14 | Stop reason: DRUGHIGH

## 2023-06-07 RX ORDER — POTASSIUM CHLORIDE 1500 MG/1
40 TABLET, EXTENDED RELEASE ORAL ONCE
Status: COMPLETED | OUTPATIENT
Start: 2023-06-07 | End: 2023-06-07

## 2023-06-07 RX ORDER — DOXYCYCLINE 100 MG/1
100 CAPSULE ORAL 2 TIMES DAILY
Qty: 6 CAPSULE | Refills: 0 | DISCHARGE
Start: 2023-06-07 | End: 2023-06-10

## 2023-06-07 RX ORDER — WARFARIN SODIUM 6 MG/1
6 TABLET ORAL
Status: DISCONTINUED | OUTPATIENT
Start: 2023-06-07 | End: 2023-06-07 | Stop reason: HOSPADM

## 2023-06-07 RX ADMIN — POTASSIUM CHLORIDE 40 MEQ: 1500 TABLET, EXTENDED RELEASE ORAL at 11:20

## 2023-06-07 RX ADMIN — Medication 40 MG: at 06:40

## 2023-06-07 RX ADMIN — GABAPENTIN 100 MG: 100 CAPSULE ORAL at 08:37

## 2023-06-07 RX ADMIN — SUCRALFATE ORAL 1 G: 1 SUSPENSION ORAL at 06:12

## 2023-06-07 RX ADMIN — BUMETANIDE 2 MG: 1 TABLET ORAL at 08:37

## 2023-06-07 RX ADMIN — DOXYCYCLINE HYCLATE 100 MG: 100 CAPSULE ORAL at 13:24

## 2023-06-07 RX ADMIN — ALLOPURINOL 100 MG: 100 TABLET ORAL at 08:36

## 2023-06-07 RX ADMIN — LEVOTHYROXINE SODIUM 100 MCG: 100 TABLET ORAL at 06:11

## 2023-06-07 RX ADMIN — SUCRALFATE ORAL 1 G: 1 SUSPENSION ORAL at 11:20

## 2023-06-07 RX ADMIN — DOXYCYCLINE HYCLATE 100 MG: 100 CAPSULE ORAL at 08:37

## 2023-06-07 RX ADMIN — SACUBITRIL AND VALSARTAN 1 TABLET: 24; 26 TABLET, FILM COATED ORAL at 13:24

## 2023-06-07 ASSESSMENT — ACTIVITIES OF DAILY LIVING (ADL)
ADLS_ACUITY_SCORE: 51

## 2023-06-07 NOTE — PLAN OF CARE
Goal Outcome Evaluation:      Pt A&Ox4, VSS on room air. Scrotum edema elevated on wash cloth. Wounds covered and intact. On 1500 fluid restriction , adequately voiding. Ambulates with stand by assist. Denies pain.

## 2023-06-07 NOTE — PROGRESS NOTES
"[facility] Nurse Inpatient Assessment     Consulted for: Multiple LE wounds    Patient History (according to provider note(s):      \"Chacorta Mendoza is a 87 year old male with history including HTN; CHF; HLD; PAF; AS s/p TAVR; GERD with esophagitis; PPM (leadless); gout; JAZMIN not on CPAP; hypothyroidism; CKD; and obesity; who was initially admitted to OSH 5/28/2023 with food impaction in his esophagus.\"    Assessment:      Areas visualized during today's visit: Skin folds , Heels  and Lower extremities     5/30/2023 buttocks/upper thighs- no photo 6/7 Chronic intact skin discoloration   due to years of sleeping in recliner       5/30/2023- not photographed 6/7 Right upper groin/lower abdominal skin fold Intertrigo    Wound Base: resurfaced epidermis     Palpation of the wound bed: normal      Drainage: none      Description of drainage: none    Status: HEALED     6/7/2023 Right elbow, Partial thickness skin tear        Wound Base: red dermis, dry bloody drainage     Palpation of the wound bed: normal      Drainage: small     Description of drainage: dark red/brown     Measurements (length x width x depth, in cm) 0.1 x 1.7 x 0.1      Tunneling N/A     Undermining N/A  Periwound skin: intact      Color: normal and consistent with surrounding tissue      Temperature: normal   Odor: none  Pain: Denies  Status: HEALING     6/7/23 R) anterior lower leg partial thickness wounds   due to bumping his leg in the context of venous insufficiency          Wound Base: Proximal wound is resurfaced with some dry serous drainage at wound edges. Distal wound is partially resurfaced with ~ 75% red dermis. Scattered intact scabs.     Palpation of the wound bed: normal      Drainage: small     Description of drainage: serosanguinous     Measurements (length x width x depth, in cm) Distal  3.9  x 2.3  x  0.2 cm      Tunneling N/A     Undermining N/A  Periwound skin: intact, hemosiderin staining, dry, " edematous      Color: normal and consistent with surrounding tissue      Temperature: normal   Odor: none  Pain: denies  LE 1+ edema, + pedal pulses     5/30/2023 L) lateral lower leg eschar due to bumping his leg   in the context of venous insufficiency      Wound Base: red, moist dermis     Palpation of the wound bed: normal      Drainage: scant serosanguinous     Measurements (length x width x depth, in cm) 1.5 x 1.4 x 0.1  Tunneling N/A     Undermining N/A  Periwound skin: intact, hemosiderin staining, dry      Color: normal and consistent with surrounding tissue      Temperature: normal   Odor: none  Pain: denies  LE 1+ edema, + pedal pulses  Status: EVOLVING     5/30/2023- not photographed 6/7 L) medial lower leg partial thickness wounds  due to bumping his leg in the context of venous insufficiency    Wound Base: resurfaced pink epidermis    LE 1+ edema, + pedal pulses   Status: HEALED    Treatment Plan:     Right elbow and lower leg wounds every other day (even) and PRN  1. Cleanse wounds with NS or MicroKlenz #[id number]  2. Surrounding skin: Dry completely, then apply and protect with Cavilon no sting barrier wipe #[id number] (allow to dry)  3. Cover with silicone foam dressing (Optifoam is a little gentler than Mepilex)  4. Rub Sween cream into dry areas on feet, shins, and heels    Right elbow wound: Every other day (even)  1. Cleanse with wound cleanser and pat dry.  2. Keep the epidermal skin flap intact as much as possible.  3. Apply an Optifoam border dressing. Draw an arrow to indicate in which direction to remove the dressing to avoid disturbing the skin flap.    Skin Fold Management (Interdry Ag #[id number]) BID and PRN     1.   Remove fabric from skin fold for cleansing. Discard if soiled  2.   Cleanse skin with viola cleaning cloths of soft cloths and water (important not to use products that contain emollients with this fabric). Pat dry  3.   Cut fabric to size adding 2 extra inches to hang outside of the  skin fold (for maximum moisture wicking)  4.   Place one edge of a single layer of fabric into the base of the fold allowing 2 inches of overhang    Pressure Injury prevention (please order supplies if not in room)  1. Turn every 2 hours, side to side avoid supine on pressure redistribution support surface   2. Apply sween 24 cream to dry skin on heels and legs   3. Float heels off bed with use of pillows under legs, if ineffective, obtain offloading boots: Heel Lift boots ( Prevalon #[id number])  4.  If incontinent, Cleanse with incontinent cleanser (e.g. Cathi spray # [id number]) followed by skin barrier protectant (e.g. Critic Aid paste #10975)  twice daily AND after each incontinent episode  5.  Prevent sliding and shear by limiting HOB to 30 degrees or less unless contraindicated, use knee gatch first if not contraindicated  6.  If sitting, use pressure redistribution chair cushion (#907183); if unable to shift weight every hour, please limit sitting to an hour at a time  7. Optimize nutrition     Orders: Reviewed    RECOMMEND PRIMARY TEAM ORDER: None, at this time  Education provided: plan of care, wound progress, Moisture management and Hygiene  Discussed plan of care with: Patient  WOC nurse follow-up plan: weekly  Notify WOC if wound(s) deteriorate.  Nursing to notify the Provider(s) and re-consult the WOC Nurse if new skin concern.    DATA:     Current support surface: Standard  Standard gel/foam mattress (IsoFlex, Atmos air, etc)  Containment of urine/stool: Brief  BMI: Body mass index is 42.3 kg/m .   Active diet order: Orders Placed This Encounter      Diet      Combination Diet Regular Diet; Thin Liquids (level 0); 2 gm NA Diet     Output: I/O last 3 completed shifts:  In: 620 [P.O.:620]  Out: 2875 [Urine:2875]     Labs: Recent Labs   Lab 06/07/23  0720 06/06/23  0758   HGB 9.1* 9.2*   INR  --  1.82*   WBC 5.2 5.2     Pressure injury risk assessment:   Sensory Perception: 4-->no impairment  Moisture:  "3-->occasionally moist  Activity: 3-->walks occasionally  Mobility: 3-->slightly limited  Nutrition: 3-->adequate  Friction and Shear: 3-->no apparent problem  Aly Score: 19    Ashely Pollack RN, CWON   Please contact through [employer] at name or group \"[facility] nurse\" (M-F [id number])  Leave VM @ *[phone]- checked occasionally  "

## 2023-06-07 NOTE — PROGRESS NOTES
Patient vital signs are at baseline: Yes  Patient able to ambulate as they were prior to admission or with assist devices provided by therapies during their stay: Yes  Patient MUST void prior to discharge: Yes  Patient able to tolerate oral intake: Yes  Pain has adequate pain control using Oral analgesics: Yes  Does patient have an identified : N/A  Has goal D/C date and time been discussed with patient:Yes    A&Ox4. CMS intact. Voiding adequately. Able to ambulate with a walker and gait belt. WOC nurse removed dressing and reapplied new dressings. Denies pain, nausea/vomiting, or numbness and tingling. Tolerating diet, denies trouble swallowing. Discharged to a [facility] nursing facility, [facility]. AVS instructions and discharge papers faxed to the facility. Discharged @2:20pm with daughter.

## 2023-06-07 NOTE — DISCHARGE SUMMARY
[facility]    Discharge Summary  Hospitalist    Date of Admission:  5/29/2023  Date of Discharge:  6/7/2023  Discharging Provider: Yayo Hanson MD, MD    Discharge Diagnoses   -Impacted food bolus, suspect related to esophageal dysmotility - s/p EGD treatment 5/28/2023 (at [facility]) with recurrent bolus and repeat 5/29/2023 (at [facility]).  -Suspected Candida esophagitis.  -Severe grade LA D esophagitis (previously diagnosed).  -Dysphagia related to above  -Aspiration pneumonia.  -Tick bite with target lesion (left forearm), concern for Lyme disease  -Hypotension, suspect multifactorial related to hypovolemia, question septic shock component.  Resolved  -Low HR readings, suspect inaccurate/spurious readings related to PVC's.  CHF (HFrEF).  -Paroxysmal atrial fibrillation.  -Aortic stenossi s/p TAVR (2019).  -PPM (leadless, 2019).  -H/o hypertension (benign essential).  -MELIZA, suspect prerenal, on CKD.  -Thrombocytopenia, unclear etiology, possibly medication effect or chronic from other cause.    History of Present Illness   This patient is a 87 year old  male with a significant past medical history of hypertension afib on coumadin and morbid obesity who presents with spitting foamy stuff all night. He ate meatloaf and potatoes last evening and went to bed. All night he was spitting up foamy stuff. No CP/SOB-cough while spitting up. No nausea but did thow up even water after he drank. No fever/ chills. No recent illness    Hospital Course   Chacorta Mendoza was admitted on 5/29/2023.  The following problems were addressed during his hospitalization:    Principal Problem:    Shock (H)  Chacorta Mendoza is a 87 year old male with history including HTN; CHF; HLD; PAF; AS s/p TAVR; GERD with esophagitis; PPM (leadless); gout; JAZMIN not on CPAP; hypothyroidism; CKD; and obesity; who was initially admitted to OSH 5/28/2023 with food impaction in his esophagus. Subsequently underwent EGD  5/28/2023 with removal with irrigation and lavage. Improved and diet advanced, but at lunch, had symptoms of recurrent food impaction with subsequently development of hypotension requiring pressors. Subsequently transferred to [facility] ICU 5/29/2023 for further management (unclear why repeat endoscopy could not be done at OSH).     EGD performed by Dr. Beck 5/29 with successful advancement of food embolus into the stomach. Pt subsequently improved and was weaned off pressors. Transferred to  Hospitalist service 5/31/2023.        Impacted food bolus, suspect related to esophageal dysmotility - s/p EGD treatment 5/28/2023 (at OSH) with recurrent bolus and repeat 5/29/2023 (at [facility]).  Suspected Candida esophagitis.  Severe grade LA D esophagitis (previously diagnosed).  Dysphagia related to above.  * Noted that EGD report in the [facility] system 3/2023 showed grade D esophagitis but no stricture. No apparent h/o dilatation. Noted that pt did not follow the antireflux regimen recommended at that time.  * Initial presentation as above. EGD 5/29 showed dilation of the esophagus; clotted blood in the middle third of the esophagus; good in the esophagus; patulous lower esophageal sphincter; food (residue) in the stomach; noted successful advancement of food embolus into the stomach.   * GI felt this was a motility problem and not stricture; also concern for Candida esophagitis and recommended sitting bolt upright while eating and eating slowly. Started on fluconazole on admit. SLP consulted on admit.  -no complaints. Swallowing fine. Brought up some clear sputum today. No gerd sxs. No abd pain. No rn concerns  -completed course fluconazole 5/29 to 6/4     -has done well over last few days prior to discharge. Tolerating diet. No dysphagia. No gerd. No GI sxs.     Plan at discharge.   - Continue pantoprazole daily and sucralfate QID.  - Continue current diet: Soft & Bite Sized Diet (level 7) Thin Liquids (level 0)    -  Advance diet as able per SLP.  - Patient should continue to sit bolt upright while eating and eat slowly.  - Continue aspiration precautions.  -outpatient GI follow up - [facility]     Aspiration pneumonia.  Tick bite with target lesion (left forearm), concern for Lyme disease.  -oxygenating well. No acute hypoxic respiratory failure.   * Initial presentation as above. CXR at OSH 5/28 showed patchy bibasilar opacities; mild bilateral vascular and interstitial prominence that could be a mild degree of edema. Treated with azithromycin and ceftriaxone at OSH. Started on pressors at OSH.  * Continued on pressors in ICU (see below). Started on empiric pipericillin-tazobactam on admit 5/29.  * Doxycycline started 5/30 after MD was notified that pt had a tick removed from his left forearm a few day PTA and on exam he did appear to have a target lesion at the site. No other signs/sxs of Lyme disease noted.  * Sats remained stable in 90's on RA on 6/1. Pipericillin-tazobactam stopped and ceftriaxone started 6/1.  -completed 7 days iv abx in hospital   -lungs sound clear. Off oxygen      Plan;   - Continue doxycyline 100mg bid (started 5/30) for tick bite with rash - plan 10 day course last dose 6/9 pm     Hypotension, suspect multifactorial related to hypovolemia, question septic shock component.  resolved  * Initial presentation as above. Given IVF's at OSH. Required pressors at OSH prior to transfer.  * Noted with mild hypotension requiring low level pressors in ICU, not clear that this was related to infection/sepsis; subsequently weaned off. Started on midodrine in the ICU.  * BP's improved 6/2. Pt did note that sacubitril-valsartan recently started PTA and pt had dizziness with this.  - Trial of stopping midodrine 2.5 mg TID started 6/2  -has been normotensive (low nl sbp)since stopping midodrine  - held multiple PTA BP meds.  -restarted cardiac meds prior to discharge and tolerating. , nl sbp      Low HR  readings, suspect inaccurate/spurious readings related to PVC's.  CHF (HFrEF).  Paroxysmal atrial fibrillation.  Aortic stenossi s/p TAVR (2019).  PPM (leadless, 2019).  H/o hypertension (benign essential).  [PTA BP meds/diuretics: bumetanide 2 mg BID; sacubitril-valsartan 24-26 mg 0.5 tablets BID; spironolactone 0.5 mg daily.]  * PTA meds held on admit.  * Echo 5/31 showed LVEF 35-40% with WMA (noted LVEF 35-40% with similar WMA in 10/2022); RV systolic function moderately reduced; TAVR with gradients borderline mildly increased; technically difficult.  * On 6/2, noted with low HR reading in 30's, asymptomatic. ECG showed paced rhythm with PVC's. Overall, suspect low HR readings related to PVC's.  Pt did note that sacubitril-valsartan recently started PTA and pt had dizziness with this.                  Recent Labs   Lab 06/02/23  0657 06/01/23  0512 05/31/23  0533 05/30/23  0519 05/29/23  0520 05/28/23  0709   INR 2.23* 2.39* 2.54* 2.96* 2.94* 2.30*      bp up to 111- 120s/-   Restarted bumex 6/4 and bp tolerate,  Pt diuresed well with resumption of his PTA bumex 2mg bid. He received and extra dose X2 in last 3 days.     Wt is up to 150kg from admission wt of 142 kg, peak at 152kg. Bed scale. Down to 145kg day of discharge, standing scale.   Edema BLE clearly improved. He had no chf sxs or findings.   Started on PTA dose bumex 2mg bid. On 6/5  Good UO. I/O now at -3.6 L neg fluid balance  K low on discharge. Replaced.   Added 1500cc fluid restriction     Plan;     1500cc fluid restriction started. Can consider loosening at tcu if needed.   -daily wt, I/O at TCU  -restarted bumex at PTA garcia of 2mg bid.   - restarted PTA  sacubitril-valsartan, spironolactone for now given recent hypotension.  - Continue warfarin; 6mg dose tonight at TCU then resume his PtA dosing coumadin on 6/8 - depending on INR  - daily INR  -pcp and cardiology follow up   -follow bmp at tcu  - resumed PTA kdur, on aldactone and entresto but high  dose bumex so need to follow K closely         MELIZA, suspect prerenal, on CKD.  * Cr at baseline appears to be between 1.5 and 2.0.   * Cr 1.92 on admit to OSH 5/28.  * Cr subsequently improved with volume/pressors.                  Recent Labs   Lab 06/02/23  0657 06/01/23  0512 05/31/23  0533 05/30/23  0519 05/29/23 2204 05/29/23  0520   CR 1.41* 1.46* 1.63* 1.82* 1.86* 1.69*      Stable creatinine at 1.3 on 6/5. repeat cr 1.3 on day of discharge.   K low at 3.3 on discharge. Replaced per protocol  Plan;   -  restarted PTA bumex 2mg bid with PTA potassium   - Continue to monitor BMP, recheck several day and at tcu  - Avoid nephrotoxic medications.  -restarted entresto   -restarted aldactone   - close follow up of K level       Anemia, suspect chronic component.  * Hgb 10.6 on admit to OSH 5/28. No overt clinical signs of major bleeding on admit.                  Recent Labs   Lab 06/02/23  0657 06/01/23  0512 05/31/23  0533 05/30/23  0519 05/29/23 2204 05/29/23  0520   HGB 9.2* 8.9* 9.9* 10.6* 10.1* 9.9*   Hb stable 8-9 range     - Continue to monitor CBC at tcu     Thrombocytopenia, unclear etiology, possibly medication effect or chronic from other cause.  * Noted pt has had low plts in the past.  * Plts 126K on admit to OSH 5/28.                  Recent Labs   Lab 06/02/23  0657 06/01/23  0512 05/31/23  0533 05/30/23  0519 05/29/23 2204 05/29/23  0520   * 98* 117* 116* 109* 112*   Platelet count 131 on 6/3. Improving and now stable at 131  -slightly nodular contour to liver in 3/2022 but nl spleen  - noted to be 130 range outside facility in 3/2023.   -follow up level nl at 173. May be related to acute infection.   - Continue to monitor CBC at tcu  -if remains low outpatient then should check folate, tsh, B12 level and peripheral smear at NH     Hypothyroidism.  - Continue levothyroxine.     Gout.  - Continue allopurinol.     Weakness and physical deconditioning due to multiple acute and chronic medical  "issues.  - Continue PT and OT.  -TCU placement      chronic venous stasis changes   wound care rn consulted  Wound cares in place  No superimposed infection   Cont current wound cares        Clinically Significant Risk Factors               # Hypoalbuminemia: Lowest albumin = 3.2 g/dL at 5/29/2023 10:04 PM, will monitor as appropriate   # Thrombocytopenia: Lowest platelets = 98 in last 2 days, will monitor for bleeding   # Hypertension: Noted on problem list  # Chronic heart failure with reduced ejection fraction: last echo with EF <40%       # Severe Obesity: Estimated body mass index is 43.69 kg/m  as calculated from the following:    Height as of 5/28/23: 1.854 m (6' 1\").    Weight as of this encounter: 150.2 kg (331 lb 2.1 oz)., PRESENT ON ADMISSION                 COVID-19 testing.                COVID-19 PCR Results         8/31/2021    15:38 11/24/2021    11:00 5/18/2022    18:33 10/3/2022    22:55 5/28/2023    07:12   COVID-19 PCR Results   COVID-19 Virus by PCR (External Result) Positive      Negative      Positive      Negative          SARS CoV2 PCR         Negative                 This result is from an external source.         COVID-19 Antibody Results, Testing for Immunity           No data to display                   Diet: Soft & Bite Sized Diet (level 6) Thin Liquids (level 0)    Prophylaxis: PCD's, ambulation. On warfarin.  Kimball Catheter: Not present  Lines: None     Code Status: Full Code        Disposition Plan  Discharge to tCU           Yayo Hanson MD, MD    Significant Results and Procedures   See hospital course    Pending Results   none  Unresulted Labs Ordered in the Past 30 Days of this Admission     No orders found from 4/29/2023 to 5/30/2023.          Code Status   Full Code       Primary Care Physician   GEORGES ARRIAZA    Physical Exam   Temp: 97  F (36.1  C) Temp src: Oral BP: 107/52 Pulse: 62   Resp: 18 SpO2: 97 % O2 Device: None (Room air)    Vitals:    06/06/23 0920 06/06/23 " 1534 06/07/23 0544   Weight: (!) 152.9 kg (337 lb) 147.7 kg (325 lb 9.6 oz) 145.4 kg (320 lb 9.6 oz)     Vital Signs with Ranges  Temp:  [97  F (36.1  C)-98  F (36.7  C)] 97  F (36.1  C)  Pulse:  [60-70] 62  Resp:  [16-18] 18  BP: (107-128)/(52-63) 107/52  SpO2:  [94 %-98 %] 97 %  I/O last 3 completed shifts:  In: 620 [P.O.:620]  Out: 2875 [Urine:2875]    Constitutional: Up in chair, nad  Neck; nl jvp  Respiratory:     CTAB. Breathing easiliiy   Cardiovascular: RRR no r/g/m  1+BLE symmetric edema- better    GI: soft, nt nd  . Scrotal edema- stable.   Skin/Integumen: no rash, chronic venous stasis changes  Pretibial wounds bandaged  Neuro: nl speech and alert  Psych: nl affect    Discharge Disposition   Discharged to short-term care facility  Condition at discharge: Good    Consultations This Hospital Stay   GASTROENTEROLOGY IP CONSULT  WOUND OSTOMY CONTINENCE NURSE  IP CONSULT  SPEECH LANGUAGE PATH ADULT IP CONSULT  CARE MANAGEMENT / SOCIAL WORK IP CONSULT  PHARMACY TO DOSE WARFARIN  PHYSICAL THERAPY ADULT IP CONSULT  PHYSICAL THERAPY ADULT IP CONSULT  OCCUPATIONAL THERAPY ADULT IP CONSULT  SPEECH LANGUAGE PATH ADULT IP CONSULT    Time Spent on this Encounter   IYayo MD, personally saw the patient today and spent greater than 30 minutes discharging this patient.    Discharge Orders      General info for SNF    Length of Stay Estimate: Short Term Care: Estimated # of Days <30  Condition at Discharge: Improving  Level of care:skilled   Rehabilitation Potential: Good  Admission H&P remains valid and up-to-date: Yes  Recent Chemotherapy: N/A  Use Nursing Home Standing Orders: Yes     Mantoux instructions    Give two-step Mantoux (PPD) Per Facility Policy Yes     Intake and output    Every shift     Daily weights    Call Provider for weight gain of more than 2 pounds per day or 5 pounds per week.     Activity - Up with nursing assistance     Patient care order    Abdominal/groin wound-Skin Fold  Management (Interdry Ag #[id number]) BID and PRN     1.   Remove fabric from skin fold for cleansing. Discard if soiled  2.   Cleanse skin with viola cleaning cloths of soft cloths and water (important not to use products that contain emollients with this fabric). Pat dry  3.   Cut fabric to size adding 2 extra inches to hang outside of the skin fold (for maximum moisture wicking)  4.   Place one edge of a single layer of fabric into the base of the fold allowing 2 inches of overhang     Patient care order    Right elbow and lower leg wounds every other day (even) and PRN  1. Cleanse wounds with NS or MicroKlenz #[id number]  2. Surrounding skin: Dry completely, then apply and protect with no sting barrier wipe #[id number] (allow to dry)  3. Cover with silicone foam dressings as follows:   -Right elbow Skin tear and Left leg wounds:: Mepilex Lite # [id number]   -Right leg wounds: Mepilex 4x4 #[id number]   4. sween 24 cream to dry skin on legs and heel     Reason for your hospital stay    Impacted food bolus, suspect related to esophageal dysmotility - s/p EGD treatment 5/28/2023 (at OSH) with recurrent bolus and repeat 5/29/2023 (at [facility]).  Suspected Candida esophagitis.  Severe grade LA D esophagitis (previously diagnosed).  Dysphagia related to above  Aspiration pneumonia.  Tick bite with target lesion (left forearm), concern for Lyme disease.  Low HR readings, suspect inaccurate/spurious readings related to PVC's.  CHF (HFrEF).  Paroxysmal atrial fibrillation.  Aortic stenossi s/p TAVR (2019).  PPM (leadless, 2019).  H/o hypertension (benign essential).     Patient care order    1. Please note that patient will take coumadin/warfarin 6mg X 1 on 6/7/23 in evening then start her usual coumadin dosing on 6/8/23. See medication list.  Daily INR, goal INR 2-3     Patient care order    Elevate scrotum to help reduce edema     Patient care order    Daily weights. Call MD if wt increases by 5-7 pounds in 5-7 days     Follow Up and  recommended labs and tests    1. Follow up with [facility] regarding dysphagia, esophagitis in 3 weeks  2. Follow up with pcp with bmp and cbc with platelet count 1 week after TCU discharge.   3. BMP, CBC with platelet count in 3 days  4. Schedule appointment with primary cardiologist for in 3 weeks  5. Daily INR for now, goal INR 2-3  6. Follow up with primary cardiologist in 3 weeks- please schedule, follow up heart failure     Full Code     Physical Therapy Adult Consult    Evaluate and treat as clinically indicated.    Reason:  deconditioned     Occupational Therapy Adult Consult    Evaluate and treat as clinically indicated.    Reason:  deconditioned     Speech Language Path Adult Consult    Evaluate and treat as clinically indicated.    Reason:  dysphagia, following swallowing and diet     Fall precautions     Diet    Follow this diet upon discharge: Orders Placed This Encounter      Easy to Chew Diet (level 7) Thin Liquids (level 0)     Discharge Medications   Current Discharge Medication List      START taking these medications    Details   doxycycline hyclate (VIBRAMYCIN) 100 MG capsule Take 1 capsule (100 mg) by mouth 2 times daily for 6 doses  Qty: 6 capsule, Refills: 0    Associated Diagnoses: Lyme disease      pantoprazole (PROTONIX) 2 mg/mL SUSP suspension Take 20 mLs (40 mg) by mouth every morning (before breakfast)    Associated Diagnoses: Gastric outlet obstruction         CONTINUE these medications which have CHANGED    Details   !! warfarin ANTICOAGULANT (COUMADIN) 4 MG tablet Take 1 tablet (4 mg) by mouth daily 4 mg Monday, Wednesday, Friday and 6 mg the other 4 days    Associated Diagnoses: History of aortic valve replacement      !! warfarin ANTICOAGULANT (COUMADIN) 6 MG tablet Take 1 tablet (6 mg) by mouth once for 1 dose  Qty: 1 tablet, Refills: 0    Associated Diagnoses: History of aortic valve replacement       !! - Potential duplicate medications found. Please discuss with  provider.      CONTINUE these medications which have NOT CHANGED    Details   acetaminophen (TYLENOL) 500 MG tablet Take 500 mg by mouth daily      allopurinol (ZYLOPRIM) 100 MG tablet Take 100 mg by mouth daily      atorvastatin (LIPITOR) 10 MG tablet Take 10 mg by mouth daily      bumetanide (BUMEX) 2 MG tablet Take 2 mg by mouth 2 times daily 2 tabs at 0800 and 1 tab at 1300      calcitRIOL (ROCALTROL) 0.25 MCG capsule Take 0.25 mcg by mouth daily      ferrous sulfate (FEROSUL) 325 (65 Fe) MG tablet Take 325 mg by mouth daily (with breakfast)      gabapentin (NEURONTIN) 100 MG capsule Take 100 mg by mouth daily      levothyroxine (SYNTHROID/LEVOTHROID) 100 MCG tablet Take 100 mcg by mouth daily      Multiple Vitamin (MULTIVITAMIN ADULT) TABS Take 1 tablet by mouth daily      potassium chloride ER (KLOR-CON M) 10 MEQ CR tablet Take 2 tablets by mouth daily with food      rOPINIRole (REQUIP) 1 MG tablet Take 1 mg by mouth At Bedtime      sacubitril-valsartan (ENTRESTO) 24-26 MG per tablet Take 0.5 tablets by mouth 2 times daily      spironolactone (ALDACTONE) 25 MG tablet Take 0.5 mg by mouth daily      sucralfate (CARAFATE) 1 GM tablet Take 1 g by mouth 2 times daily (with meals)      urea (CARMOL) 10 % external cream Apply 1 Application topically as needed      OTHER MEDICAL SUPPLIES by Other route See Admin Instructions Diabetic shoe         STOP taking these medications       pantoprazole (PROTONIX) 40 MG EC tablet Comments:   Reason for Stopping:             Allergies   Allergies   Allergen Reactions     Levofloxacin Muscle Pain (Myalgia) and Other (See Comments)     Other reaction(s): Myalgias  Muscle Pain  Muscle Pain  Muscle Pain       Lisinopril Other (See Comments)     Changed to ARB due to rising creatinine  Changed to ARB due to rising creatinine  Changed to ARB due to rising creatinine       Data   Most Recent 3 CBC's:Recent Labs   Lab Test 06/07/23  0720 06/06/23  0758 06/05/23  0636   WBC 5.2 5.2 4.9    HGB 9.1* 9.2* 8.5*   MCV 96 98 98    148* 133*      Most Recent 3 BMP's:  Recent Labs   Lab Test 06/07/23  0720 06/06/23  0758 06/05/23  0636    143 139   POTASSIUM 3.3* 3.8 3.9   CHLORIDE 102 104 103   CO2 29 25 26   BUN 30.1* 32.5* 30.4*   CR 1.33* 1.47* 1.33*   ANIONGAP 11 14 10   MATTHEW 8.4* 8.9 8.6*   * 99 102*     Most Recent 2 LFT's:  Recent Labs   Lab Test 05/29/23  2204 05/28/23  0709   AST 20 19   ALT 12 11   ALKPHOS 80 85   BILITOTAL 0.7 0.8     Most Recent INR's and Anticoagulation Dosing History:  Anticoagulation Dose History         Latest Ref Rng & Units 6/1/2023 6/2/2023 6/3/2023 6/4/2023   Recent Dosing and Labs   warfarin ANTICOAGULANT (COUMADIN) tablet 3 mg  3 mg, $Given      warfarin ANTICOAGULANT (COUMADIN) tablet 3 mg   3 mg, $Given     warfarin ANTICOAGULANT (COUMADIN) tablet 5 mg    5 mg, $Given 5 mg, $Given   warfarin ANTICOAGULANT (COUMADIN) tablet 6 mg        INR 0.85 - 1.15 2.39   2.23   1.96   1.84         6/5/2023 6/6/2023 6/7/2023   Recent Dosing and Labs   warfarin ANTICOAGULANT (COUMADIN) tablet 3 mg      warfarin ANTICOAGULANT (COUMADIN) tablet 3 mg      warfarin ANTICOAGULANT (COUMADIN) tablet 5 mg      warfarin ANTICOAGULANT (COUMADIN) tablet 6 mg 6 mg, $Given     INR 1.82   1.82   1.88                Most Recent 3 Troponin's:No lab results found.  Most Recent Cholesterol Panel:No lab results found.  Most Recent 6 Bacteria Isolates From Any Culture (See EPIC Reports for Culture Details):No lab results found.  Most Recent TSH, T4 and A1c Labs:No lab results found.  Results for orders placed or performed during the hospital encounter of 05/29/23   XR Chest Port 1 View    Narrative    EXAM: XR CHEST PORT 1 VIEW  LOCATION: [facility]  DATE/TIME: 5/30/2023 5:52 AM CDT    INDICATION: Follow-up infiltrates.  COMPARISON: 05/29/2023.      Impression    IMPRESSION: Cardiomegaly. Mild pulmonary vascular prominence or congestion centrally. Patchy  bilateral lower lung/airspace infiltrates or atelectasis. Findings could represent pulmonary edema. Calcified granuloma left lung base unchanged. Atherosclerotic   calcifications thoracic aortic arch. Osseous structures grossly intact. Visualized upper abdomen unremarkable. EKG wires and leads projecting over the chest obscure some details.                   XR Video Swallow with SLP or OT    Narrative    VIDEO SWALLOWING EVALUATION   2023 9:21 AM     HISTORY: dysphagia    COMPARISON: None.    FLUOROSCOPY TIME: 1.3 minutes.  SPOT IMAGES OR CINE RUNS: 10    FINDINGS:    Penetration with thin consistency. Refer to speech pathology report.    NABEEL NUNEZ MD         SYSTEM ID:  [id number]   Echocardiogram Complete     Value    LVEF  35-40%    Narrative    [id number]  [id number]  [id number]  [id number]^FRANCOIS^MILTON^ELIGIO     [facility]  Echocardiography Laboratory  [address] [place] [address]     Name: ILSA TAYLOR  MRN: [id number]  : 1935  Study Date: 2023 02:26 PM  Age: 87 yrs  Gender: Male  Patient Location: [facility]  Reason For Study: Shock  Ordering Physician: MILTON PARRISH  Referring Physician: Brian Salmeron  Performed By: Daniel Jackson RDCS     BSA: 2.6 m2  Height: 74 in  Weight: 313 lb  HR: 64  BP: 119/53 mmHg  ______________________________________________________________________________  Procedure  Complete Portable Echo Adult. Optison (NDC #8638-2194) given intravenously.  ______________________________________________________________________________  Interpretation Summary     The visual ejection fraction is 35-40%.  Left ventricular systolic function is mild to moderately reduced. On the  previous echo from [facility] in 2022 the EF was similar at 35-40% with an  apical wall motion abnormality also described.  The right ventricular systolic function is moderately reduced.  There is a 29 mm Donnell 3 TAVR valve in the aortic position.The gradients  across the TAVR  valve are borderline to mildly increased. The gradients from  [facility] echo in October 2022 were slightly less with a mean gradient of 14 mmHg  and a peak gradient of 25 mmHg.  The ascending aorta is Mildly dilated.  The study was technically difficult.  ______________________________________________________________________________  Left Ventricle  The left ventricle is normal in size. There is mild to moderate concentric  left ventricular hypertrophy. Diastolic function not assessed due to atrial  fibrillation. The visual ejection fraction is 35-40%. Left ventricular  systolic function is mild to moderately reduced. There is mild-moderate global  hypokinesia of the left ventricle. The apex and the distal inferoseptal walls  appear particularly hypokinetic. Septal motion is consistent with conduction  abnormality.     Right Ventricle  The right ventricle is mildly dilated. The right ventricular systolic function  is moderately reduced. The RV apex and mid to distal RV free wall are severely  hypokinetic,.     Atria  The left atrium is mildly dilated. The right atrium is mildly dilated. There  is no color Doppler evidence of an atrial shunt.     Mitral Valve  There is mild (1+) mitral regurgitation.     Tricuspid Valve  There is mild (1+) tricuspid regurgitation. The right ventricular systolic  pressure is approximated at 25.2 mmHg plus the right atrial pressure.     Aortic Valve  No aortic regurgitation is present. The peak AoV pressure gradient is 29.0  mmHg. The mean AoV pressure gradient is 16.0 mmHg. There is a 29 mm Donnell 3  TAVR valve in the aortic position.The gradients across the TAVR valve are  borderline to mildly increased,.     Pulmonic Valve  There is no pulmonic valvular regurgitation.     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 Dilation of the inferior vena cava is  present with abnormal respiratory variation in diameter. IVC diameter >2.1 cm  collapsing <50% with sniff suggests a high RA pressure  estimated at 15 mmHg or  greater.     Pericardium  There is no pericardial effusion.     Rhythm  The rhythm was atrial fibrillation. Ventricular bigemini was present  throughout the study.  ______________________________________________________________________________  MMode/2D Measurements & Calculations  IVSd: 1.4 cm     LVIDd: 5.1 cm  LVIDs: 4.1 cm  LVPWd: 1.2 cm  FS: 20.1 %  LV mass(C)d: 271.2 grams  LV mass(C)dI: 103.2 grams/m2  LA dimension: 4.5 cm  asc Aorta Diam: 3.9 cm  LVOT diam: 2.3 cm  LVOT area: 4.0 cm2  LA Volume (BP): 92.3 ml  LA Volume Index (BP): 35.1 ml/m2  RWT: 0.49  TAPSE: 2.7 cm     Doppler Measurements & Calculations  MV E max christopher: 112.0 cm/sec  MV dec slope: 385.3 cm/sec2  MV dec time: 0.29 sec  Ao V2 max: 267.3 cm/sec  Ao max P.0 mmHg  Ao V2 mean: 183.7 cm/sec  Ao mean P.0 mmHg  Ao V2 VTI: 54.2 cm  AMRIK(I,D): 1.5 cm2  AMRIK(V,D): 1.5 cm2  Ao acc time: 0.08 sec  LV V1 max P.2 mmHg  LV V1 max: 102.3 cm/sec  LV V1 VTI: 20.3 cm  SV(LVOT): 81.8 ml  SI(LVOT): 31.1 ml/m2  PA acc time: 0.12 sec  TR max christopher: 251.1 cm/sec  TR max P.2 mmHg  AV Christopher Ratio (DI): 0.38  AMRIK Index (cm2/m2): 0.57  E/E' avg: 15.3  Lateral E/e': 14.9  Medial E/e': 15.6  RV S Christopher: 8.1 cm/sec     ______________________________________________________________________________  Report approved by: Silvia Hale 2023 04:21 PM

## 2023-06-07 NOTE — PROGRESS NOTES
Care Management Discharge Note    Discharge Date: 06/07/2023       Discharge Disposition: Skilled Nursing Facility    Discharge Services:      Discharge DME:      Discharge Transportation: family or friend will provide    Private pay costs discussed: Not applicable    Does the patient's insurance plan have a 3 day qualifying hospital stay waiver?  No    PAS Confirmation Code: [id number]  Patient/family educated on Medicare website which has current facility and service quality ratings: yes    Education Provided on the Discharge Plan:    Persons Notified of Discharge Plans: Nurse, family, huc, patient, facility   Patient/Family in Agreement with the Plan: yes    Handoff Referral Completed: No    Additional Information:  Writer faxed orders to facility, checked medications, arranged transportation with family, and followed safe discharge planning.         SHABANA Holly

## 2023-06-08 ENCOUNTER — PATIENT OUTREACH (OUTPATIENT)
Dept: CARE COORDINATION | Facility: CLINIC | Age: 88
End: 2023-06-08
Payer: MEDICARE

## 2023-06-08 ENCOUNTER — TELEPHONE (OUTPATIENT)
Dept: ANTICOAGULATION | Facility: CLINIC | Age: 88
End: 2023-06-08
Payer: MEDICARE

## 2023-06-08 NOTE — TELEPHONE ENCOUNTER
TCU nurse calling to report INR, per chart review appears not an active ACC patient with [place].  Patient is managed by [facility]    [address]    [address]    [place], [place] [address] [phone]      If calling back, please give this info to nurse or confirm that patient should be followed by above.    Left detailed VM for TCU letting them know this patient is managed by [facility].    Rachel Mayo RN

## 2023-06-08 NOTE — PROGRESS NOTES
[facility] GERIATRICS  Primary Care Provider & Clinic: GEORGES ARRIAZA, [address]  / [place] [address]  Chief Complaint   Patient presents with     Hospital F/U     [facility] 5/29/2023 - 6/7/2023     [place] Medical Record Number: [id number]  [facility] of [facility] Where En[facility] Took [facility] [[id number]]    Chacorta Mendoza is a 87 year old (1935), admitted to the above facility from  [facility]. Hospital stay 5/29/23 through 6/7/23.    HPI:      Patient is 89-year-old male with significant past medical history of hypertension, A-fib on Coumadin, CHF with reduced ejection fraction,  status post TAVR 2019, pacemaker, hypertension, CKD, hypothyroidism, gout,  thrombocytopenia, morbid obesity who experienced an impacted food bolus related to upper GI dysmotility.  Status post EGD treatment on May 28 with recurrent bolus and repeat on 529.  Aspiration pneumonia.  During hospitalization experienced hypotension related to hypovolemia and potential septic shock.  Patient also had a history of a tick removed from his forearm few days prior to hospitalization.  Appeared to have target lesion at the site.  Started on doxycycline after completing course of ceftriaxone.  Due to weakness and hospitalization, patient transferred to TCU.  Patient is doing remarkably well in TCU.  Due to family event scheduled this weekend, patient is eager to be discharged today.  Therapy staff and nursing staff concur that he is doing well enough for discharge home with home care.  Patient indicates that he is swallowing well complications, make sure to take small bites, chew thoroughly, use she was in between to provide medications.  Denies any choking or coughing issues.  Denies shortness of breath or chest pain.  Denies esophageal, epigastric, or abdominal pain.  Antibiotic completed.  Denies any new pain other than his chronic gout and back pain.  Sleeping  well in recliner which he is accustomed to at home. denies any issues with bowel or bladder.     CODE STATUS/ADVANCE DIRECTIVES DISCUSSION: Full Code - DNR / DNI  Patient's living condition: lives with family, adult son   ALLERGIES:   Allergies   Allergen Reactions     Aspirin Unknown     Levofloxacin Muscle Pain (Myalgia)     Lisinopril Other (See Comments)     Changed to ARB due to rising Creatinine     Losartan Rash      PAST MEDICAL HISTORY: No past medical history on file.   PAST SURGICAL HISTORY:  has a past surgical history that includes Esophagoscopy, gastroscopy, duodenoscopy (EGD), combined (N/A, 5/28/2023) and Esophagoscopy, gastroscopy, duodenoscopy (EGD), combined (N/A, 5/29/2023).  FAMILY HISTORY: family history is not on file.  SOCIAL HISTORY:      Post Discharge Medication Reconciliation Status:  MED REC REQUIRED  Post Medication Reconciliation Status: discharge medications reconciled, continue medications without change    Current Outpatient Medications   Medication Sig     acetaminophen (TYLENOL) 500 MG tablet Take 500 mg by mouth daily     allopurinol (ZYLOPRIM) 100 MG tablet Take 100 mg by mouth daily     atorvastatin (LIPITOR) 10 MG tablet Take 10 mg by mouth daily     bumetanide (BUMEX) 2 MG tablet Take 2 mg by mouth 2 times daily 2 tabs at 0800 and 1 tab at 1300     calcitRIOL (ROCALTROL) 0.25 MCG capsule Take 0.25 mcg by mouth daily     doxycycline hyclate (VIBRAMYCIN) 100 MG capsule Take 1 capsule (100 mg) by mouth 2 times daily for 6 doses     ferrous sulfate (FEROSUL) 325 (65 Fe) MG tablet Take 325 mg by mouth daily (with breakfast)     gabapentin (NEURONTIN) 100 MG capsule Take 100 mg by mouth daily     levothyroxine (SYNTHROID/LEVOTHROID) 100 MCG tablet Take 100 mcg by mouth daily     Multiple Vitamin (MULTIVITAMIN ADULT) TABS Take 1 tablet by mouth daily     OTHER MEDICAL SUPPLIES by Other route See Admin Instructions Diabetic shoe     pantoprazole (PROTONIX) 2 mg/mL SUSP suspension  "Take 20 mLs (40 mg) by mouth every morning (before breakfast)     potassium chloride ER (KLOR-CON M) 10 MEQ CR tablet Take 2 tablets by mouth daily with food     rOPINIRole (REQUIP) 1 MG tablet Take 1 mg by mouth At Bedtime     sacubitril-valsartan (ENTRESTO) 24-26 MG per tablet Take 0.5 tablets by mouth 2 times daily     spironolactone (ALDACTONE) 25 MG tablet Take 0.5 mg by mouth daily     sucralfate (CARAFATE) 1 GM tablet Take 1 g by mouth 2 times daily (with meals)     urea (CARMOL) 10 % external cream Apply 1 Application topically as needed     warfarin ANTICOAGULANT (COUMADIN) 4 MG tablet Take 1 tablet (4 mg) by mouth daily 4 mg Monday, Wednesday, Friday and 6 mg the other 4 days     No current facility-administered medications for this visit.     ROS:  10 point ROS of systems including Constitutional, Eyes, Respiratory, Cardiovascular, Gastroenterology, Genitourinary, Integumentary, Musculoskeletal, Psychiatric were all negative except for pertinent positives noted in my HPI.    Vitals:  /60   Pulse 63   Temp 98.2  F (36.8  C)   Resp 18   Ht 1.854 m (6' 1\")   Wt 142.9 kg (315 lb)   BMI 41.56 kg/m    Exam:  GENERAL APPEARANCE:  Alert, in no distress, cooperative, Sitting comfortably in recliner chair  RESP:  lungs clear to auscultation , no respiratory distress  CV:  regular rate and rhythm, no murmur, rub, or gallop, peripheral edema 1+ in Bilateral lower extremities  ABDOMEN:  Obese, bowel sounds present, soft, non-tender  M/S:   Gait and station abnormal Generalized weakness.  Using walker  SKIN:  Chronic venous stasis bilateral lower extremities.  Open areas over bilateral tibias covered with dressing  NEURO:   NFD  PSYCH:  normal insight, judgement and memory, affect and mood normal    Lab/Diagnostic Data:  Recent labs in [facility] reviewed by me today.  and   Most Recent 3 CBC's:  Recent Labs   Lab Test 06/07/23  0720 06/06/23  0758 06/05/23  0636   WBC 5.2 5.2 4.9   HGB 9.1* 9.2* 8.5*   MCV 96 98 " 98    148* 133*     Most Recent 3 BMP's:  Recent Labs   Lab Test 06/07/23  0720 06/06/23  0758 06/05/23  0636    143 139   POTASSIUM 3.3* 3.8 3.9   CHLORIDE 102 104 103   CO2 29 25 26   BUN 30.1* 32.5* 30.4*   CR 1.33* 1.47* 1.33*   ANIONGAP 11 14 10   MATTHEW 8.4* 8.9 8.6*   * 99 102*     Most Recent 3 INR's:  Recent Labs   Lab Test 06/07/23  0720 06/06/23  0758 06/05/23  0636   INR 1.88* 1.82* 1.82*       ASSESSMENT/PLAN:  (J69.0) Aspiration pneumonitis (H)  (primary encounter diagnosis)  (T18.108D) Foreign body in esophagus, subsequent encounter  (K20.90) Esophagitis  (R13.10) Dysphagia, unspecified type  Currently swallowing okay without difficulty.  Breathing okay  Asymptomatic  Continue to be significantly upright position  Continue current meds  Follow with speech therapy per home care or outpatient  Follow GI in 3 weeks    (I50.32) Chronic diastolic heart failure (H)  (I10) Essential hypertension  (I48.0) Paroxysmal atrial fibrillation (H)  (Z95.0) Pacemaker  (Z95.2) History of aortic valve replacement  Continues on preadmission diuretics and Entresto.  Bilateral lower extremity edema remains but is improving.  Weight stable.  Continue with daily weights at home.  All care to follow.  Follow cardiology in approximately 3 weeks.  Next INR due Dee 15.  Follow-up anticoagulation clinic and cardiology    (D69.6) Thrombocytopenia (H)  Patient has had previously low platelets, dipped below 100 during this past hospitalization.  Now at 131.  Not in TCU long enough to follow  PCP to follow  Follow CBC.  If remains low, consider checking folate, PSA, B12, and peripheral smear    (N18.4) Chronic kidney disease (CKD), stage IV (severe) (H)  Creatinine gradually improving.  Restarted prior to admission meds  PCP to follow  BMP with close follow-up with potassium within the week per home care or outpatient  Renally dose medications  Avoid nephrotoxins    (E03.9) Hypothyroidism (acquired)  Continues on  PTA levothyroxine  PCP to follow    (M10.9) Gout, unspecified cause, unspecified chronicity, unspecified site  Continues on PTA allopurinol  PCP to follow    Orders:  OK to discharge home with home care    Discharge instructions:  Instructed to follow-up with PCP within a week for follow-up conditions and labs including BMP and CBC  Home Care services  Daily weights  When to call PCP        Documentation of Face to Face and Certification for Home Health Services    I certify that services are/were furnished while this patient was under the care of a physician and that a physician or an allowed non-physician practitioner (NPP), had a face-to-face encounter that meets the physician face-to-face encounter requirements. The encounter was in whole, or in part, related to the primary reason for home health. The patient is confined to his/her home and needs intermittent skilled nursing, physical therapy, speech-language pathology, or the continued need for occupational therapy. A plan of care has been established by a physician and is periodically reviewed by a physician.  Date of Face-to-Face Encounter: 6/9/2023.    I certify that, based on my findings, the following services are medically necessary home health services: Nursing, Occupational Therapy, Physical Therapy and Speech Language Therapy.    My clinical findings support the need for the above skilled services because: Requires assistance of another person or specialized equipment to access medical services because patient: Is unable to operate assistive equipment on their own. and Range of motion limitations prevents ability to exit home safely...    Patient to re-establish plan of care with their PCP within 7-10 days after leaving the facility to reestablish care.  Medicare certified MALISSA provider: EMILIO Bo CNP  Date: June 9, 2023        Total time spent with patient visit at the [facility] nursing [facility] was 35 min including patient visit and  review of past records. Greater than 50% of total time spent with counseling and coordinating care due to admission and discharge, med rec, education, collaboration with TCU staff.     Electronically signed by: EMILIO Bo CNP

## 2023-06-08 NOTE — PROGRESS NOTES
Connected Care Resource Center    Background: Transitional Care Management program identified per system criteria and reviewed by Connected Care Resource Center team for possible outreach.    Assessment: Upon chart review, CCRC Team member will not proceed with patient outreach related to this episode of Transitional Care Management program due to reason below:    Non-[facility] TCU: CCRC team member noted patient discharged to TCU/ARU/LTACH. Patient is not established with a [facility] currently supported by Primary Care-Care Coordination therefore handoff to Primary Care-Care Coordination is not appropriate at this time.    Plan: Transitional Care Management episode addressed appropriately per reason noted above.      Sonam Rubin  [facility] Care Resource [place], [facility]    *Connected Care Resource Team does NOT follow patient ongoing. Referrals are identified based on internal discharge reports and the outreach is to ensure patient has an understanding of their discharge instructions.

## 2023-06-08 NOTE — PLAN OF CARE
Speech Language Therapy Discharge Summary    Reason for therapy discharge:    Discharged to transitional care facility.    Progress towards therapy goal(s). See goals on Care Plan in [facility] electronic health record for goal details.  Goals met    Therapy recommendation(s):    Patient may benefit from short-course SLP for swallow strategy training as indicated.    SLP note from 6/6/2023:  Patient seen for swallowing tx in the afternoon. Pt denies dysphagia, GI, or additional symptoms. Pt again ate 2 crackers and 4 oz applesauce without difficulties. He drank thin liquids without s/sx of aspiration. Discussed extensively diet upgrade to regular diet. Pt agreeable and discussed precautions of cutting all foods in small bites, moisten foods with sauces (which pt is already doing independently per report), and alternating consistencies. Minimal to no dysphagia and esophageal symptoms. Recommend continue upgrade to regular diet with thin liquid -- no straw, double swallows, alternate textures, slow rate, small meals at a sitting, stay up at 90 degrees for 60+ min after intake.

## 2023-06-09 ENCOUNTER — TRANSITIONAL CARE UNIT VISIT (OUTPATIENT)
Dept: GERIATRICS | Facility: CLINIC | Age: 88
End: 2023-06-09
Payer: MEDICARE

## 2023-06-09 VITALS
DIASTOLIC BLOOD PRESSURE: 60 MMHG | HEIGHT: 73 IN | RESPIRATION RATE: 18 BRPM | SYSTOLIC BLOOD PRESSURE: 118 MMHG | TEMPERATURE: 98.2 F | WEIGHT: 315 LBS | HEART RATE: 63 BPM | BODY MASS INDEX: 41.75 KG/M2

## 2023-06-09 DIAGNOSIS — Z95.0 PACEMAKER: ICD-10-CM

## 2023-06-09 DIAGNOSIS — D69.6 THROMBOCYTOPENIA (H): ICD-10-CM

## 2023-06-09 DIAGNOSIS — I48.0 PAROXYSMAL ATRIAL FIBRILLATION (H): ICD-10-CM

## 2023-06-09 DIAGNOSIS — I50.32 CHRONIC DIASTOLIC HEART FAILURE (H): ICD-10-CM

## 2023-06-09 DIAGNOSIS — I10 ESSENTIAL HYPERTENSION: ICD-10-CM

## 2023-06-09 DIAGNOSIS — J69.0 ASPIRATION PNEUMONITIS (H): Primary | ICD-10-CM

## 2023-06-09 DIAGNOSIS — R13.10 DYSPHAGIA, UNSPECIFIED TYPE: ICD-10-CM

## 2023-06-09 DIAGNOSIS — N18.4 CHRONIC KIDNEY DISEASE (CKD), STAGE IV (SEVERE) (H): ICD-10-CM

## 2023-06-09 DIAGNOSIS — M10.9 GOUT, UNSPECIFIED CAUSE, UNSPECIFIED CHRONICITY, UNSPECIFIED SITE: ICD-10-CM

## 2023-06-09 DIAGNOSIS — K20.90 ESOPHAGITIS: ICD-10-CM

## 2023-06-09 DIAGNOSIS — T18.108D FOREIGN BODY IN ESOPHAGUS, SUBSEQUENT ENCOUNTER: ICD-10-CM

## 2023-06-09 DIAGNOSIS — Z95.2 HISTORY OF AORTIC VALVE REPLACEMENT: ICD-10-CM

## 2023-06-09 DIAGNOSIS — E03.9 HYPOTHYROIDISM (ACQUIRED): ICD-10-CM

## 2023-06-09 PROCEDURE — 99316 NF DSCHRG MGMT 30 MIN+: CPT | Performed by: NURSE PRACTITIONER

## 2023-06-09 NOTE — LETTER
6/9/2023        RE: Chacorta Mendoza  [address] [place] [address]        [facility] GERIATRICS  Primary Care Provider & Clinic: GEORGES ARRIAZA, [address]  / [place] [address]  Chief Complaint   Patient presents with     Hospital F/U     [facility] 5/29/2023 - 6/7/2023     [place] Medical Record Number: [id number]  [facility] Where En[facility] ([facility]) [[id number]]    Chacorta Mendoza is a 87 year old (1935), admitted to the above facility from  [facility]. Hospital stay 5/29/23 through 6/7/23.    HPI:      Patient is 89-year-old male with significant past medical history of hypertension, A-fib on Coumadin, CHF with reduced ejection fraction,  status post TAVR 2019, pacemaker, hypertension, CKD, hypothyroidism, gout,  thrombocytopenia, morbid obesity who experienced an impacted food bolus related to upper GI dysmotility.  Status post EGD treatment on May 28 with recurrent bolus and repeat on 529.  Aspiration pneumonia.  During hospitalization experienced hypotension related to hypovolemia and potential septic shock.  Patient also had a history of a tick removed from his forearm few days prior to hospitalization.  Appeared to have target lesion at the site.  Started on doxycycline after completing course of ceftriaxone.  Due to weakness and hospitalization, patient transferred to TCU.  Patient is doing remarkably well in TCU.  Due to family event scheduled this weekend, patient is eager to be discharged today.  Therapy staff and nursing staff concur that he is doing well enough for discharge home with home care.  Patient indicates that he is swallowing well complications, make sure to take small bites, chew thoroughly, use she was in between to provide medications.  Denies any choking or coughing issues.  Denies shortness of breath or chest pain.  Denies esophageal, epigastric, or abdominal pain.  Antibiotic  completed.  Denies any new pain other than his chronic gout and back pain.  Sleeping well in recliner which he is accustomed to at home. denies any issues with bowel or bladder.     CODE STATUS/ADVANCE DIRECTIVES DISCUSSION: Full Code - DNR / DNI  Patient's living condition: lives with family, adult son   ALLERGIES:   Allergies   Allergen Reactions     Aspirin Unknown     Levofloxacin Muscle Pain (Myalgia)     Lisinopril Other (See Comments)     Changed to ARB due to rising Creatinine     Losartan Rash      PAST MEDICAL HISTORY: No past medical history on file.   PAST SURGICAL HISTORY:  has a past surgical history that includes Esophagoscopy, gastroscopy, duodenoscopy (EGD), combined (N/A, 5/28/2023) and Esophagoscopy, gastroscopy, duodenoscopy (EGD), combined (N/A, 5/29/2023).  FAMILY HISTORY: family history is not on file.  SOCIAL HISTORY:      Post Discharge Medication Reconciliation Status:  MED REC REQUIRED  Post Medication Reconciliation Status: discharge medications reconciled, continue medications without change    Current Outpatient Medications   Medication Sig     acetaminophen (TYLENOL) 500 MG tablet Take 500 mg by mouth daily     allopurinol (ZYLOPRIM) 100 MG tablet Take 100 mg by mouth daily     atorvastatin (LIPITOR) 10 MG tablet Take 10 mg by mouth daily     bumetanide (BUMEX) 2 MG tablet Take 2 mg by mouth 2 times daily 2 tabs at 0800 and 1 tab at 1300     calcitRIOL (ROCALTROL) 0.25 MCG capsule Take 0.25 mcg by mouth daily     doxycycline hyclate (VIBRAMYCIN) 100 MG capsule Take 1 capsule (100 mg) by mouth 2 times daily for 6 doses     ferrous sulfate (FEROSUL) 325 (65 Fe) MG tablet Take 325 mg by mouth daily (with breakfast)     gabapentin (NEURONTIN) 100 MG capsule Take 100 mg by mouth daily     levothyroxine (SYNTHROID/LEVOTHROID) 100 MCG tablet Take 100 mcg by mouth daily     Multiple Vitamin (MULTIVITAMIN ADULT) TABS Take 1 tablet by mouth daily     OTHER MEDICAL SUPPLIES by Other route See  "Admin Instructions Diabetic shoe     pantoprazole (PROTONIX) 2 mg/mL SUSP suspension Take 20 mLs (40 mg) by mouth every morning (before breakfast)     potassium chloride ER (KLOR-CON M) 10 MEQ CR tablet Take 2 tablets by mouth daily with food     rOPINIRole (REQUIP) 1 MG tablet Take 1 mg by mouth At Bedtime     sacubitril-valsartan (ENTRESTO) 24-26 MG per tablet Take 0.5 tablets by mouth 2 times daily     spironolactone (ALDACTONE) 25 MG tablet Take 0.5 mg by mouth daily     sucralfate (CARAFATE) 1 GM tablet Take 1 g by mouth 2 times daily (with meals)     urea (CARMOL) 10 % external cream Apply 1 Application topically as needed     warfarin ANTICOAGULANT (COUMADIN) 4 MG tablet Take 1 tablet (4 mg) by mouth daily 4 mg Monday, Wednesday, Friday and 6 mg the other 4 days     No current facility-administered medications for this visit.     ROS:  10 point ROS of systems including Constitutional, Eyes, Respiratory, Cardiovascular, Gastroenterology, Genitourinary, Integumentary, Musculoskeletal, Psychiatric were all negative except for pertinent positives noted in my HPI.    Vitals:  /60   Pulse 63   Temp 98.2  F (36.8  C)   Resp 18   Ht 1.854 m (6' 1\")   Wt 142.9 kg (315 lb)   BMI 41.56 kg/m    Exam:  GENERAL APPEARANCE:  Alert, in no distress, cooperative, Sitting comfortably in recliner chair  RESP:  lungs clear to auscultation , no respiratory distress  CV:  regular rate and rhythm, no murmur, rub, or gallop, peripheral edema 1+ in Bilateral lower extremities  ABDOMEN:  Obese, bowel sounds present, soft, non-tender  M/S:   Gait and station abnormal Generalized weakness.  Using walker  SKIN:  Chronic venous stasis bilateral lower extremities.  Open areas over bilateral tibias covered with dressing  NEURO:   NFD  PSYCH:  normal insight, judgement and memory, affect and mood normal    Lab/Diagnostic Data:  Recent labs in [facility] reviewed by me today.  and   Most Recent 3 CBC's:  Recent Labs   Lab Test " 06/07/23  0720 06/06/23  0758 06/05/23  0636   WBC 5.2 5.2 4.9   HGB 9.1* 9.2* 8.5*   MCV 96 98 98    148* 133*     Most Recent 3 BMP's:  Recent Labs   Lab Test 06/07/23  0720 06/06/23 0758 06/05/23  0636    143 139   POTASSIUM 3.3* 3.8 3.9   CHLORIDE 102 104 103   CO2 29 25 26   BUN 30.1* 32.5* 30.4*   CR 1.33* 1.47* 1.33*   ANIONGAP 11 14 10   MATTHEW 8.4* 8.9 8.6*   * 99 102*     Most Recent 3 INR's:  Recent Labs   Lab Test 06/07/23  0720 06/06/23 0758 06/05/23 0636   INR 1.88* 1.82* 1.82*       ASSESSMENT/PLAN:  (J69.0) Aspiration pneumonitis (H)  (primary encounter diagnosis)  (T18.108D) Foreign body in esophagus, subsequent encounter  (K20.90) Esophagitis  (R13.10) Dysphagia, unspecified type  Currently swallowing okay without difficulty.  Breathing okay  Asymptomatic  Continue to be significantly upright position  Continue current meds  Follow with speech therapy per home care or outpatient  Follow GI in 3 weeks    (I50.32) Chronic diastolic heart failure (H)  (I10) Essential hypertension  (I48.0) Paroxysmal atrial fibrillation (H)  (Z95.0) Pacemaker  (Z95.2) History of aortic valve replacement  Continues on preadmission diuretics and Entresto.  Bilateral lower extremity edema remains but is improving.  Weight stable.  Continue with daily weights at home.  All care to follow.  Follow cardiology in approximately 3 weeks.  Next INR due Dee 15.  Follow-up anticoagulation clinic and cardiology    (D69.6) Thrombocytopenia (H)  Patient has had previously low platelets, dipped below 100 during this past hospitalization.  Now at 131.  Not in TCU long enough to follow  PCP to follow  Follow CBC.  If remains low, consider checking folate, PSA, B12, and peripheral smear    (N18.4) Chronic kidney disease (CKD), stage IV (severe) (H)  Creatinine gradually improving.  Restarted prior to admission meds  PCP to follow  BMP with close follow-up with potassium within the week per home care or  outpatient  Renally dose medications  Avoid nephrotoxins    (E03.9) Hypothyroidism (acquired)  Continues on PTA levothyroxine  PCP to follow    (M10.9) Gout, unspecified cause, unspecified chronicity, unspecified site  Continues on PTA allopurinol  PCP to follow    Orders:  OK to discharge home with home care    Discharge instructions:  Instructed to follow-up with PCP within a week for follow-up conditions and labs including BMP and CBC  Home Care services  Daily weights  When to call PCP        Documentation of Face to Face and Certification for Home Health Services    I certify that services are/were furnished while this patient was under the care of a physician and that a physician or an allowed non-physician practitioner (NPP), had a face-to-face encounter that meets the physician face-to-face encounter requirements. The encounter was in whole, or in part, related to the primary reason for home health. The patient is confined to his/her home and needs intermittent skilled nursing, physical therapy, speech-language pathology, or the continued need for occupational therapy. A plan of care has been established by a physician and is periodically reviewed by a physician.  Date of Face-to-Face Encounter: 6/9/2023.    I certify that, based on my findings, the following services are medically necessary home health services: Nursing, Occupational Therapy, Physical Therapy and Speech Language Therapy.    My clinical findings support the need for the above skilled services because: Requires assistance of another person or specialized equipment to access medical services because patient: Is unable to operate assistive equipment on their own. and Range of motion limitations prevents ability to exit home safely...    Patient to re-establish plan of care with their PCP within 7-10 days after leaving the facility to reestablish care.  Medicare certified [facility] provider: EMILIO Bo CNP  Date: June 9,  2023        Total time spent with patient visit at the skilled nursing facility was 35 min including patient visit and review of past records. Greater than 50% of total time spent with counseling and coordinating care due to admission and discharge, med rec, education, collaboration with TCU staff.     Electronically signed by: EMILIO Bo CNP      Sincerely,        EMILIO Bo CNP

## 2023-06-09 NOTE — LETTER
6/9/2023        RE: Chacorta Mendoza  [address] [address] [place] [address]        [facility] GERIATRICS  Primary Care Provider & Clinic: GEORGES ARRIAZA, [address]  / [place] [address]  Chief Complaint   Patient presents with     Hospital F/U     [facility] 5/29/2023 - 6/7/2023     [place] Medical Record Number: [id number]  [facility] En[facility] [[id number]]    Chacorta Mendoza is a 87 year old (1935), admitted to the above facility from  [facility]. Hospital stay 5/29/23 through 6/7/23.    HPI:      Patient is 89-year-old male with significant past medical history of hypertension, A-fib on Coumadin, CHF with reduced ejection fraction,  status post TAVR 2019, pacemaker, hypertension, CKD, hypothyroidism, gout,  thrombocytopenia, morbid obesity who experienced an impacted food bolus related to upper GI dysmotility.  Status post EGD treatment on May 28 with recurrent bolus and repeat on 529.  Aspiration pneumonia.  During hospitalization experienced hypotension related to hypovolemia and potential septic shock.  Patient also had a history of a tick removed from his forearm few days prior to hospitalization.  Appeared to have target lesion at the site.  Started on doxycycline after completing course of ceftriaxone.  Due to weakness and hospitalization, patient transferred to TCU.  Patient is doing remarkably well in TCU.  Due to family event scheduled this weekend, patient is eager to be discharged today.  Therapy staff and nursing staff concur that he is doing well enough for discharge home with home care.  Patient indicates that he is swallowing well complications, make sure to take small bites, chew thoroughly, use she was in between to provide medications.  Denies any choking or coughing issues.  Denies shortness of breath or chest pain.  Denies esophageal, epigastric, or abdominal pain.  Antibiotic  completed.  Denies any new pain other than his chronic gout and back pain.  Sleeping well in recliner which he is accustomed to at home. denies any issues with bowel or bladder.     CODE STATUS/ADVANCE DIRECTIVES DISCUSSION: Full Code - DNR / DNI  Patient's living condition: lives with family, adult son   ALLERGIES:   Allergies   Allergen Reactions     Aspirin Unknown     Levofloxacin Muscle Pain (Myalgia)     Lisinopril Other (See Comments)     Changed to ARB due to rising Creatinine     Losartan Rash      PAST MEDICAL HISTORY: No past medical history on file.   PAST SURGICAL HISTORY:  has a past surgical history that includes Esophagoscopy, gastroscopy, duodenoscopy (EGD), combined (N/A, 5/28/2023) and Esophagoscopy, gastroscopy, duodenoscopy (EGD), combined (N/A, 5/29/2023).  FAMILY HISTORY: family history is not on file.  SOCIAL HISTORY:      Post Discharge Medication Reconciliation Status:  MED REC REQUIRED  Post Medication Reconciliation Status: discharge medications reconciled, continue medications without change    Current Outpatient Medications   Medication Sig     acetaminophen (TYLENOL) 500 MG tablet Take 500 mg by mouth daily     allopurinol (ZYLOPRIM) 100 MG tablet Take 100 mg by mouth daily     atorvastatin (LIPITOR) 10 MG tablet Take 10 mg by mouth daily     bumetanide (BUMEX) 2 MG tablet Take 2 mg by mouth 2 times daily 2 tabs at 0800 and 1 tab at 1300     calcitRIOL (ROCALTROL) 0.25 MCG capsule Take 0.25 mcg by mouth daily     doxycycline hyclate (VIBRAMYCIN) 100 MG capsule Take 1 capsule (100 mg) by mouth 2 times daily for 6 doses     ferrous sulfate (FEROSUL) 325 (65 Fe) MG tablet Take 325 mg by mouth daily (with breakfast)     gabapentin (NEURONTIN) 100 MG capsule Take 100 mg by mouth daily     levothyroxine (SYNTHROID/LEVOTHROID) 100 MCG tablet Take 100 mcg by mouth daily     Multiple Vitamin (MULTIVITAMIN ADULT) TABS Take 1 tablet by mouth daily     OTHER MEDICAL SUPPLIES by Other route See  "Admin Instructions Diabetic shoe     pantoprazole (PROTONIX) 2 mg/mL SUSP suspension Take 20 mLs (40 mg) by mouth every morning (before breakfast)     potassium chloride ER (KLOR-CON M) 10 MEQ CR tablet Take 2 tablets by mouth daily with food     rOPINIRole (REQUIP) 1 MG tablet Take 1 mg by mouth At Bedtime     sacubitril-valsartan (ENTRESTO) 24-26 MG per tablet Take 0.5 tablets by mouth 2 times daily     spironolactone (ALDACTONE) 25 MG tablet Take 0.5 mg by mouth daily     sucralfate (CARAFATE) 1 GM tablet Take 1 g by mouth 2 times daily (with meals)     urea (CARMOL) 10 % external cream Apply 1 Application topically as needed     warfarin ANTICOAGULANT (COUMADIN) 4 MG tablet Take 1 tablet (4 mg) by mouth daily 4 mg Monday, Wednesday, Friday and 6 mg the other 4 days     No current facility-administered medications for this visit.     ROS:  10 point ROS of systems including Constitutional, Eyes, Respiratory, Cardiovascular, Gastroenterology, Genitourinary, Integumentary, Musculoskeletal, Psychiatric were all negative except for pertinent positives noted in my HPI.    Vitals:  /60   Pulse 63   Temp 98.2  F (36.8  C)   Resp 18   Ht 1.854 m (6' 1\")   Wt 142.9 kg (315 lb)   BMI 41.56 kg/m    Exam:  GENERAL APPEARANCE:  Alert, in no distress, cooperative, Sitting comfortably in recliner chair  RESP:  lungs clear to auscultation , no respiratory distress  CV:  regular rate and rhythm, no murmur, rub, or gallop, peripheral edema 1+ in Bilateral lower extremities  ABDOMEN:  Obese, bowel sounds present, soft, non-tender  M/S:   Gait and station abnormal Generalized weakness.  Using walker  SKIN:  Chronic venous stasis bilateral lower extremities.  Open areas over bilateral tibias covered with dressing  NEURO:   NFD  PSYCH:  normal insight, judgement and memory, affect and mood normal    Lab/Diagnostic Data:  Recent labs in [facility] reviewed by me today.  and   Most Recent 3 CBC's:  Recent Labs   Lab Test " 06/07/23  0720 06/06/23  0758 06/05/23  0636   WBC 5.2 5.2 4.9   HGB 9.1* 9.2* 8.5*   MCV 96 98 98    148* 133*     Most Recent 3 BMP's:  Recent Labs   Lab Test 06/07/23  0720 06/06/23 0758 06/05/23  0636    143 139   POTASSIUM 3.3* 3.8 3.9   CHLORIDE 102 104 103   CO2 29 25 26   BUN 30.1* 32.5* 30.4*   CR 1.33* 1.47* 1.33*   ANIONGAP 11 14 10   MATTHEW 8.4* 8.9 8.6*   * 99 102*     Most Recent 3 INR's:  Recent Labs   Lab Test 06/07/23  0720 06/06/23 0758 06/05/23 0636   INR 1.88* 1.82* 1.82*       ASSESSMENT/PLAN:  (J69.0) Aspiration pneumonitis (H)  (primary encounter diagnosis)  (T18.108D) Foreign body in esophagus, subsequent encounter  (K20.90) Esophagitis  (R13.10) Dysphagia, unspecified type  Currently swallowing okay without difficulty.  Breathing okay  Asymptomatic  Continue to be significantly upright position  Continue current meds  Follow with speech therapy per home care or outpatient  Follow GI in 3 weeks    (I50.32) Chronic diastolic heart failure (H)  (I10) Essential hypertension  (I48.0) Paroxysmal atrial fibrillation (H)  (Z95.0) Pacemaker  (Z95.2) History of aortic valve replacement  Continues on preadmission diuretics and Entresto.  Bilateral lower extremity edema remains but is improving.  Weight stable.  Continue with daily weights at home.  All care to follow.  Follow cardiology in approximately 3 weeks.  Next INR due Dee 15.  Follow-up anticoagulation clinic and cardiology    (D69.6) Thrombocytopenia (H)  Patient has had previously low platelets, dipped below 100 during this past hospitalization.  Now at 131.  Not in TCU long enough to follow  PCP to follow  Follow CBC.  If remains low, consider checking folate, PSA, B12, and peripheral smear    (N18.4) Chronic kidney disease (CKD), stage IV (severe) (H)  Creatinine gradually improving.  Restarted prior to admission meds  PCP to follow  BMP with close follow-up with potassium within the week per home care or  outpatient  Renally dose medications  Avoid nephrotoxins    (E03.9) Hypothyroidism (acquired)  Continues on PTA levothyroxine  PCP to follow    (M10.9) Gout, unspecified cause, unspecified chronicity, unspecified site  Continues on PTA allopurinol  PCP to follow    Orders:  OK to discharge home with home care    Discharge instructions:  Instructed to follow-up with PCP within a week for follow-up conditions and labs including BMP and CBC  Home Care services  Daily weights  When to call PCP        Documentation of Face to Face and Certification for Home Health Services    I certify that services are/were furnished while this patient was under the care of a physician and that a physician or an allowed non-physician practitioner (NPP), had a face-to-face encounter that meets the physician face-to-face encounter requirements. The encounter was in whole, or in part, related to the primary reason for home health. The patient is confined to his/her home and needs intermittent skilled nursing, physical therapy, speech-language pathology, or the continued need for occupational therapy. A plan of care has been established by a physician and is periodically reviewed by a physician.  Date of Face-to-Face Encounter: 6/9/2023.    I certify that, based on my findings, the following services are medically necessary home health services: Nursing, Occupational Therapy, Physical Therapy and Speech Language Therapy.    My clinical findings support the need for the above skilled services because: Requires assistance of another person or specialized equipment to access medical services because patient: Is unable to operate assistive equipment on their own. and Range of motion limitations prevents ability to exit home safely...    Patient to re-establish plan of care with their PCP within 7-10 days after leaving the facility to reestablish care.  Medicare certified MALIS[facility] provider: EMILIO Bo CNP  Date: June 9,  2023        Total time spent with patient visit at the skilled nursing facility was 35 min including patient visit and review of past records. Greater than 50% of total time spent with counseling and coordinating care due to admission and discharge, med rec, education, collaboration with TCU staff.     Electronically signed by: EMILIO Bo CNP        Sincerely,        EMILIO Bo CNP

## 2023-08-10 PROCEDURE — 80076 HEPATIC FUNCTION PANEL: CPT | Mod: ORL | Performed by: INTERNAL MEDICINE

## 2023-08-10 PROCEDURE — 84156 ASSAY OF PROTEIN URINE: CPT | Mod: ORL | Performed by: INTERNAL MEDICINE

## 2023-08-10 PROCEDURE — 85025 COMPLETE CBC W/AUTO DIFF WBC: CPT | Mod: ORL | Performed by: INTERNAL MEDICINE

## 2024-01-12 ENCOUNTER — DOCUMENTATION ONLY (OUTPATIENT)
Dept: GERIATRICS | Facility: CLINIC | Age: 89
End: 2024-01-12
Payer: MEDICARE

## 2024-01-14 ENCOUNTER — LAB REQUISITION (OUTPATIENT)
Dept: LAB | Facility: CLINIC | Age: 89
End: 2024-01-14
Payer: MEDICARE

## 2024-01-14 DIAGNOSIS — I50.23 ACUTE ON CHRONIC SYSTOLIC (CONGESTIVE) HEART FAILURE (H): ICD-10-CM

## 2024-01-15 ENCOUNTER — TRANSITIONAL CARE UNIT VISIT (OUTPATIENT)
Dept: GERIATRICS | Facility: CLINIC | Age: 89
End: 2024-01-15
Payer: MEDICARE

## 2024-01-15 VITALS
WEIGHT: 289.7 LBS | SYSTOLIC BLOOD PRESSURE: 120 MMHG | DIASTOLIC BLOOD PRESSURE: 76 MMHG | OXYGEN SATURATION: 97 % | HEART RATE: 81 BPM | TEMPERATURE: 97.3 F | RESPIRATION RATE: 18 BRPM | BODY MASS INDEX: 38.22 KG/M2

## 2024-01-15 DIAGNOSIS — I50.42 CHRONIC COMBINED SYSTOLIC AND DIASTOLIC CONGESTIVE HEART FAILURE (H): ICD-10-CM

## 2024-01-15 DIAGNOSIS — N18.32 STAGE 3B CHRONIC KIDNEY DISEASE (H): ICD-10-CM

## 2024-01-15 DIAGNOSIS — I48.0 PAROXYSMAL ATRIAL FIBRILLATION (H): ICD-10-CM

## 2024-01-15 DIAGNOSIS — E66.01 MORBID EXOGENOUS OBESITY (H): ICD-10-CM

## 2024-01-15 DIAGNOSIS — D61.818 PANCYTOPENIA (H): ICD-10-CM

## 2024-01-15 DIAGNOSIS — E11.8 TYPE 2 DIABETES MELLITUS WITH COMPLICATION, WITHOUT LONG-TERM CURRENT USE OF INSULIN (H): ICD-10-CM

## 2024-01-15 DIAGNOSIS — N25.81 SECONDARY HYPERPARATHYROIDISM (H): ICD-10-CM

## 2024-01-15 DIAGNOSIS — I50.23 ACUTE ON CHRONIC HFREF (HEART FAILURE WITH REDUCED EJECTION FRACTION) (H): Primary | ICD-10-CM

## 2024-01-15 PROBLEM — N18.4 CHRONIC KIDNEY DISEASE (CKD), STAGE IV (SEVERE) (H): Status: RESOLVED | Noted: 2023-03-20 | Resolved: 2024-01-15

## 2024-01-15 PROBLEM — R57.9 SHOCK (H): Status: RESOLVED | Noted: 2023-05-29 | Resolved: 2024-01-15

## 2024-01-15 LAB
CREAT SERPL-MCNC: 1.89 MG/DL (ref 0.67–1.17)
EGFRCR SERPLBLD CKD-EPI 2021: 34 ML/MIN/1.73M2
POTASSIUM SERPL-SCNC: 3.7 MMOL/L (ref 3.4–5.3)

## 2024-01-15 PROCEDURE — 36415 COLL VENOUS BLD VENIPUNCTURE: CPT | Performed by: FAMILY MEDICINE

## 2024-01-15 PROCEDURE — P9603 ONE-WAY ALLOW PRORATED MILES: HCPCS | Performed by: FAMILY MEDICINE

## 2024-01-15 PROCEDURE — 99309 SBSQ NF CARE MODERATE MDM 30: CPT | Performed by: NURSE PRACTITIONER

## 2024-01-15 PROCEDURE — 84132 ASSAY OF SERUM POTASSIUM: CPT | Performed by: FAMILY MEDICINE

## 2024-01-15 PROCEDURE — 82565 ASSAY OF CREATININE: CPT | Performed by: FAMILY MEDICINE

## 2024-01-15 RX ORDER — CYANOCOBALAMIN (VITAMIN B-12) 500 MCG
0.4 TABLET ORAL DAILY
COMMUNITY
End: 2024-02-06

## 2024-01-15 RX ORDER — SENNOSIDES 8.6 MG
1 TABLET ORAL 2 TIMES DAILY
COMMUNITY

## 2024-01-15 RX ORDER — ATORVASTATIN CALCIUM 10 MG/1
10 TABLET, FILM COATED ORAL DAILY
COMMUNITY

## 2024-01-15 RX ORDER — NALOXONE HYDROCHLORIDE 0.4 MG/ML
.1-.4 INJECTION, SOLUTION INTRAMUSCULAR; INTRAVENOUS; SUBCUTANEOUS PRN
COMMUNITY

## 2024-01-15 RX ORDER — LANOLIN ALCOHOL/MO/W.PET/CERES
3 CREAM (GRAM) TOPICAL
COMMUNITY
End: 2024-02-06

## 2024-01-15 RX ORDER — LIDOCAINE 4 G/G
1 PATCH TOPICAL EVERY 24 HOURS
COMMUNITY

## 2024-01-15 RX ORDER — METOLAZONE 5 MG/1
5 TABLET ORAL DAILY PRN
COMMUNITY

## 2024-01-15 RX ORDER — NYSTATIN 100000 [USP'U]/G
POWDER TOPICAL 2 TIMES DAILY
COMMUNITY

## 2024-01-15 RX ORDER — POLYETHYLENE GLYCOL 3350 17 G/17G
17 POWDER, FOR SOLUTION ORAL DAILY
COMMUNITY

## 2024-01-15 NOTE — LETTER
1/15/2024        RE: Chacorta Mendoza  [address] [facility] [place] [address]        [facility] Admission  PCP & CLINIC: GEORGES ARRIAZA, [address]  / [place] [address]  Chief Complaint   Patient presents with     Hospital F/U   [place] MRN: [id number]. Place of Service where encounter took place:  [facility] ON [facility] (TCU) [4002] Chacorta Mendoza  is a 88 year old  (1935), admitted to the above facility from  [facility]. Hospital stay 12/26/23 through 1/11/24. Admitted to this facility for  rehab, medical management, and nursing care. HPI information obtained from: facility chart records, facility staff, patient report, [facility] chart review, and Care Everywhere [facility] chart review.     Brief Summary of Hospital Course: Chacorta presented to [place] on 12/26 w/ constipation and abdominal pain. He was found to be in CHF exacerbation, to have cirrhosis w/ mild ascites, pancytopenia. He was given PPI, diuresed, and received GI consult w/ [facility].     Updates since admission to transitional care unit: Chacorta presented to TCU on 1/11/24 s/p the above hospitalization. Today, Chacorta says he is doing okay.  He has a little bit of pain in his back, and some shortness of breath.  He denies a cough or fever.  He denies any palpitations.  His bowels are moving well, he denies any heartburn or nausea.  He feels his appetite is pretty good.  He denies any bladder issues.    CODE STATUS/ADVANCE DIRECTIVES DISCUSSION: DNR/DNI. Patient's living condition: lives alone. ALLERGIES: Aspirin, Levofloxacin, Lisinopril, and Losartan PAST MEDICAL HISTORY:  has no past medical history on file.. PAST SURGICAL HISTORY:   has a past surgical history that includes Esophagoscopy, gastroscopy, duodenoscopy (EGD), combined (N/A, 5/28/2023) and Esophagoscopy, gastroscopy, duodenoscopy (EGD), combined (N/A, 5/29/2023).. FAMILY HISTORY: family history is not on file.. SOCIAL HISTORY:     Post Discharge Medication Reconciliation  Status: discharge medications reconciled and changed, per note/orders.  Current Outpatient Medications   Medication Sig Dispense Refill     atorvastatin (LIPITOR) 10 MG tablet Take 10 mg by mouth daily       bumetanide (BUMEX) 2 MG tablet Take 2 mg by mouth 2 times daily 2 tabs at 0800 and 1 tab at 1300       calcitRIOL (ROCALTROL) 0.25 MCG capsule Take 0.25 mcg by mouth daily       ferrous sulfate (FEROSUL) 325 (65 Fe) MG tablet Take 325 mg by mouth daily (with breakfast)       folic acid 0.8 MG CAPS Take 0.4 mg by mouth daily       levothyroxine (SYNTHROID/LEVOTHROID) 100 MCG tablet Take 100 mcg by mouth daily       Lidocaine (LIDOCARE) 4 % Patch Place 1 patch onto the skin every 24 hours To prevent lidocaine toxicity, patient should be patch free for 12 hrs daily.       melatonin 3 MG tablet Take 3 mg by mouth nightly as needed for sleep       metolazone (ZAROXOLYN) 5 MG tablet Take 5 mg by mouth daily as needed       naloxone (NARCAN) 0.4 MG/ML injection Inject 0.1-0.4 mg into the vein as needed for opioid reversal       nystatin (MYCOSTATIN) 406229 UNIT/GM external powder Apply topically 2 times daily       pantoprazole (PROTONIX) 2 mg/mL SUSP suspension Take 20 mLs (40 mg) by mouth every morning (before breakfast)       polyethylene glycol (MIRALAX) 17 g packet Take 17 g by mouth daily       potassium chloride ER (KLOR-CON M) 10 MEQ CR tablet Take 2 tablets by mouth daily with food       rOPINIRole (REQUIP) 1 MG tablet Take 1 mg by mouth At Bedtime       sennosides (SENOKOT) 8.6 MG tablet Take 1 tablet by mouth 2 times daily       warfarin ANTICOAGULANT (COUMADIN) 4 MG tablet Take 1 tablet (4 mg) by mouth daily 4 mg Monday, Wednesday, Friday and 6 mg the other 4 days       ROS: Limited secondary to cognitive impairment but today pt reports the above and 10 point ROS of systems including Constitutional, Eyes, Respiratory, Cardiovascular, Gastroenterology, Genitourinary, Integumentary, Musculoskeletal,  Psychiatric were all negative except for pertinent positives noted in my HPI.    Vitals: /76   Pulse 81   Temp 97.3  F (36.3  C)   Resp 18   Wt 131.4 kg (289 lb 11.2 oz)   SpO2 97%   BMI 38.22 kg/m    Exam:  GENERAL APPEARANCE: Alert, in no distress, cooperative.   ENT: Mouth/posterior oropharynx intact w/ moist mucous membranes, hearing acuity [place].  EYES: EOM, conjunctivae, lids, pupils and irises normal, PERRL2.   RESP: Respiratory effort good, no respiratory distress, Lung sounds clear/diminished. On RA.   CV: Auscultation of heart reveals S1, S2, rate controlled and rhythm irregular, no murmur, no rub or gallop, Edema 1+ BLE. Peripheral pulses are 2+.  ABDOMEN: Normal bowel sounds, soft, non-tender abdomen, and no masses palpated.  SKIN: Inspection/Palpation of skin and subcutaneous tissue baseline w/ fragility. No wounds/rashes noted except dermatitis BLE and very small left wound calf as noted below:  Left calf:    NEURO: CN II-XII at patient's baseline, sensation baseline PPS.  PSYCH: Insight, judgement, and memory are impaired at baseline, affect and mood are happy/engaged.    Lab/Diagnostic data: Recent labs in [employer] reviewed by me today.     ASSESSMENT/PLAN:  Acute on chronic HFrEF (heart failure with reduced ejection fraction) (H)  Chronic combined systolic and diastolic congestive heart failure (H)  Paroxysmal atrial fibrillation (H)  Pancytopenia (H)  Secondary hyperparathyroidism (H24)  Morbid exogenous obesity (H)  Type 2 diabetes mellitus with complication, without long-term current use of insulin (H)  CKD 3b (H)  Acute on chronic. Tenuous/Complex.  Provider reviewed records from hospitalization, facility, and interpreted most recent imaging/lab work, and vital signs.  Daily weights and diuretics are ordered, compression and edema to be evaluated further by therapy department, and Chacorta has a little bit of shortness of breath and this should be monitored closely.  Pancytopenia was noted, and  this could certainly be contributing to fatigue on top of heart failure, but Chacorta does not appear toxic or infectious at this time.  Will closely monitor with unique testing as noted below (CBC).  Atrial fibrillation is rate controlled without medication, and Chacorta is properly anticoagulated with Coumadin.  INR therapeutic and therefore will continue dosing and recheck INR on 1/19.  Notable back pain, and would like to mitigate use of opioids in older adult.  Chacorta appears to be sensitive to medication and his environment, warranting the use of as needed Seroquel.  Will schedule Tylenol, alter as needed for safety, and then decrease oxycodone as noted below.  Will temporarily renew Seroquel and place CMS guidelines required parameter.  Noted PRN orders for antipsychotic medications are limited to 14 days. Face to Face encounter done today.. Evaluation indicates non-pharmacological interventions are not always effective.  Chacorta has significant polypharmacy, and this can contribute to falls, confusion, and constipation (which she was experiencing.)  Initial MoCA test is 5/30 (severe impairment).  Diabetes is diet controlled and mild with last A1c of 6.1%.  Continue current plan of care.  Obesity certainly contributes to overall morbidity and mortality, and weights are being monitored closely.  Underlying secondary hyperparathyroidism.  Continue calcitriol.  Notable polypharmacy and changes in diuresis while hospitalized.  Will closely monitor CKD and electrolytes with unique testing through BMP as noted below.  Will reduce polypharmacy, and therefore reduce risk by discontinuing Colace, zinc, allopurinol (no gout flare noted in chart in over 10 years, and this is lowest dose), and artificial saliva.  It certainly likely that polypharmacy is contributing to dry mouth.  Wound management with nursing, orders in place.  Follow up w/in 1 week or as needed.    Orders:  Discontinue Artificial saliva.  Discontinue  Colace.  Discontinue Zinc.  Discontinue Allopurinol.  Renew PRN Seroquel x 14 days. Dx: paranoia.   Decrease Oxycodone to 2.5mg PO TID PRN. Dx: severe pain.  Tylenol 1000mg PO BID. Dx: low back pain.  Change PRN Tylenol to 650mg PO BID PRN. Dx: pain/fever.  Coumadin 6mg M/W/F.  Coumadin 4mg AOD.  Recheck INR x1 on 1/19. Dx: afib.  CBC, BMP x1 on 1/19. Dx: anemia, CHF.    Electronically signed by:  Dr. Benita Lowe, APRN CNP DNP                        Sincerely,        EMILIO Dwyer CNP

## 2024-01-15 NOTE — PROGRESS NOTES
[facility] TCU Admission  PCP & CLINIC: GEORGES ARRIAZA, [address]  / [place] [address]  Chief Complaint   Patient presents with    Hospital F/U   [place] MRN: [id number]. Place of Service where encounter took place:  [facility] ON [facility] (TCU) [4002] Chacorta Mendoza  is a 88 year old  (1935), admitted to the above facility from  [facility]. Hospital stay 12/26/23 through 1/11/24. Admitted to this facility for  rehab, medical management, and nursing care. HPI information obtained from: facility chart records, facility staff, patient report, [facility] chart review, and Care Everywhere [facility] chart review.     Brief Summary of Hospital Course: Chacorta presented to [place] on 12/26 w/ constipation and abdominal pain. He was found to be in CHF exacerbation, to have cirrhosis w/ mild ascites, pancytopenia. He was given PPI, diuresed, and received GI consult w/ [facility].     Updates since admission to transitional care unit: Chacorta presented to TCU on 1/11/24 s/p the above hospitalization. Today, Chacorta says he is doing okay.  He has a little bit of pain in his back, and some shortness of breath.  He denies a cough or fever.  He denies any palpitations.  His bowels are moving well, he denies any heartburn or nausea.  He feels his appetite is pretty good.  He denies any bladder issues.    CODE STATUS/ADVANCE DIRECTIVES DISCUSSION: DNR/DNI. Patient's living condition: lives alone. ALLERGIES: Aspirin, Levofloxacin, Lisinopril, and Losartan PAST MEDICAL HISTORY:  has no past medical history on file.. PAST SURGICAL HISTORY:   has a past surgical history that includes Esophagoscopy, gastroscopy, duodenoscopy (EGD), combined (N/A, 5/28/2023) and Esophagoscopy, gastroscopy, duodenoscopy (EGD), combined (N/A, 5/29/2023).. FAMILY HISTORY: family history is not on file.. SOCIAL HISTORY:     Post Discharge Medication Reconciliation Status: discharge medications reconciled and changed, per note/orders.  Current Outpatient  Medications   Medication Sig Dispense Refill    atorvastatin (LIPITOR) 10 MG tablet Take 10 mg by mouth daily      bumetanide (BUMEX) 2 MG tablet Take 2 mg by mouth 2 times daily 2 tabs at 0800 and 1 tab at 1300      calcitRIOL (ROCALTROL) 0.25 MCG capsule Take 0.25 mcg by mouth daily      ferrous sulfate (FEROSUL) 325 (65 Fe) MG tablet Take 325 mg by mouth daily (with breakfast)      folic acid 0.8 MG CAPS Take 0.4 mg by mouth daily      levothyroxine (SYNTHROID/LEVOTHROID) 100 MCG tablet Take 100 mcg by mouth daily      Lidocaine (LIDOCARE) 4 % Patch Place 1 patch onto the skin every 24 hours To prevent lidocaine toxicity, patient should be patch free for 12 hrs daily.      melatonin 3 MG tablet Take 3 mg by mouth nightly as needed for sleep      metolazone (ZAROXOLYN) 5 MG tablet Take 5 mg by mouth daily as needed      naloxone (NARCAN) 0.4 MG/ML injection Inject 0.1-0.4 mg into the vein as needed for opioid reversal      nystatin (MYCOSTATIN) 213415 UNIT/GM external powder Apply topically 2 times daily      pantoprazole (PROTONIX) 2 mg/mL SUSP suspension Take 20 mLs (40 mg) by mouth every morning (before breakfast)      polyethylene glycol (MIRALAX) 17 g packet Take 17 g by mouth daily      potassium chloride ER (KLOR-CON M) 10 MEQ CR tablet Take 2 tablets by mouth daily with food      rOPINIRole (REQUIP) 1 MG tablet Take 1 mg by mouth At Bedtime      sennosides (SENOKOT) 8.6 MG tablet Take 1 tablet by mouth 2 times daily      warfarin ANTICOAGULANT (COUMADIN) 4 MG tablet Take 1 tablet (4 mg) by mouth daily 4 mg Monday, Wednesday, Friday and 6 mg the other 4 days       ROS: Limited secondary to cognitive impairment but today pt reports the above and 10 point ROS of systems including Constitutional, Eyes, Respiratory, Cardiovascular, Gastroenterology, Genitourinary, Integumentary, Musculoskeletal, Psychiatric were all negative except for pertinent positives noted in my HPI.    Vitals: /76   Pulse 81    Temp 97.3  F (36.3  C)   Resp 18   Wt 131.4 kg (289 lb 11.2 oz)   SpO2 97%   BMI 38.22 kg/m    Exam:  GENERAL APPEARANCE: Alert, in no distress, cooperative.   ENT: Mouth/posterior oropharynx intact w/ moist mucous membranes, hearing acuity [place].  EYES: EOM, conjunctivae, lids, pupils and irises normal, PERRL2.   RESP: Respiratory effort good, no respiratory distress, Lung sounds clear/diminished. On RA.   CV: Auscultation of heart reveals S1, S2, rate controlled and rhythm irregular, no murmur, no rub or gallop, Edema 1+ BLE. Peripheral pulses are 2+.  ABDOMEN: Normal bowel sounds, soft, non-tender abdomen, and no masses palpated.  SKIN: Inspection/Palpation of skin and subcutaneous tissue baseline w/ fragility. No wounds/rashes noted except dermatitis BLE and very small left wound calf as noted below:  Left calf:    NEURO: CN II-XII at patient's baseline, sensation baseline PPS.  PSYCH: Insight, judgement, and memory are impaired at baseline, affect and mood are happy/engaged.    Lab/Diagnostic data: Recent labs in [employer] reviewed by me today.     ASSESSMENT/PLAN:  Acute on chronic HFrEF (heart failure with reduced ejection fraction) (H)  Chronic combined systolic and diastolic congestive heart failure (H)  Paroxysmal atrial fibrillation (H)  Pancytopenia (H)  Secondary hyperparathyroidism (H24)  Morbid exogenous obesity (H)  Type 2 diabetes mellitus with complication, without long-term current use of insulin (H)  CKD 3b (H)  Acute on chronic. Tenuous/Complex.  Provider reviewed records from hospitalization, facility, and interpreted most recent imaging/lab work, and vital signs.  Daily weights and diuretics are ordered, compression and edema to be evaluated further by therapy department, and Chacorta has a little bit of shortness of breath and this should be monitored closely.  Pancytopenia was noted, and this could certainly be contributing to fatigue on top of heart failure, but Chacorta does not appear toxic or  infectious at this time.  Will closely monitor with unique testing as noted below (CBC).  Atrial fibrillation is rate controlled without medication, and Chacorta is properly anticoagulated with Coumadin.  INR therapeutic and therefore will continue dosing and recheck INR on 1/19.  Notable back pain, and would like to mitigate use of opioids in older adult.  Chacorta appears to be sensitive to medication and his environment, warranting the use of as needed Seroquel.  Will schedule Tylenol, alter as needed for safety, and then decrease oxycodone as noted below.  Will temporarily renew Seroquel and place CMS guidelines required parameter.  Noted PRN orders for antipsychotic medications are limited to 14 days. Face to Face encounter done today.. Evaluation indicates non-pharmacological interventions are not always effective.  Chacorta has significant polypharmacy, and this can contribute to falls, confusion, and constipation (which she was experiencing.)  Initial MoCA test is 5/30 (severe impairment).  Diabetes is diet controlled and mild with last A1c of 6.1%.  Continue current plan of care.  Obesity certainly contributes to overall morbidity and mortality, and weights are being monitored closely.  Underlying secondary hyperparathyroidism.  Continue calcitriol.  Notable polypharmacy and changes in diuresis while hospitalized.  Will closely monitor CKD and electrolytes with unique testing through BMP as noted below.  Will reduce polypharmacy, and therefore reduce risk by discontinuing Colace, zinc, allopurinol (no gout flare noted in chart in over 10 years, and this is lowest dose), and artificial saliva.  It certainly likely that polypharmacy is contributing to dry mouth.  Wound management with nursing, orders in place.  Follow up w/in 1 week or as needed.    Orders:  Discontinue Artificial saliva.  Discontinue Colace.  Discontinue Zinc.  Discontinue Allopurinol.  Renew PRN Seroquel x 14 days. Dx: paranoia.   Decrease Oxycodone to  2.5mg PO TID PRN. Dx: severe pain.  Tylenol 1000mg PO BID. Dx: low back pain.  Change PRN Tylenol to 650mg PO BID PRN. Dx: pain/fever.  Coumadin 6mg M/W/F.  Coumadin 4mg AOD.  Recheck INR x1 on 1/19. Dx: afib.  CBC, BMP x1 on 1/19. Dx: anemia, CHF.    Electronically signed by:  Dr. Benita Lowe, APRN CNP DNP

## 2024-01-16 NOTE — PROGRESS NOTES
[facility] GERIATRICS    PRIMARY CARE PROVIDER AND CLINIC:  GEORGES  SOFIYA, [address]  / [place] [address]  No chief complaint on file.     [facility] Record Number:  [id number]  Place of Service where encounter took place:  No question data found.    Chacorta Mendoza  is a 88 year old  (1935), admitted to the above facility from  [facility] . Hospital stay 12/26/23 through 1/11/24..   HPI:    Pt hospitalized with CHF exacerbation, ascitics and pancytopenia in setting of cirrhosis, diuresed, and stated on PPI, has a follow up with [facility]    Today:  - CHF: sleeps in the  bed with head side slightly elevated. Reprots swelling in the legs is much better.   - GI: denies any concern  -Rehab: reports going on, walks using 2FWW with gagan belt on and wc behind him  - nose bleed: RN reports pt started having left sided nasal bleed, pt endorses he picked on his nose due to dryness. No hx of epistaxis     ===============================================  CODE STATUS/ADVANCE DIRECTIVES DISCUSSION:  No CPR- Do NOT Intubate    ALLERGIES:   Allergies   Allergen Reactions    Aspirin Unknown    Levofloxacin Muscle Pain (Myalgia)    Lisinopril Other (See Comments)     Changed to ARB due to rising Creatinine    Losartan Rash      PAST MEDICAL HISTORY: No past medical history on file.   PAST SURGICAL HISTORY:   has a past surgical history that includes Esophagoscopy, gastroscopy, duodenoscopy (EGD), combined (N/A, 5/28/2023) and Esophagoscopy, gastroscopy, duodenoscopy (EGD), combined (N/A, 5/29/2023).  FAMILY HISTORY: family history is not on file.  SOCIAL HISTORY:     Patient's living condition: lives alone    Post Discharge Medication Reconciliation Status:   MED REC REQUIRED  Post Medication Reconciliation Status: discharge medications reconciled and changed, per note/orders       Current Outpatient Medications   Medication Sig    atorvastatin (LIPITOR) 10 MG tablet Take 10 mg by mouth daily     bumetanide (BUMEX) 2 MG tablet Take 2 mg by mouth 2 times daily 2 tabs at 0800 and 1 tab at 1300    calcitRIOL (ROCALTROL) 0.25 MCG capsule Take 0.25 mcg by mouth daily    ferrous sulfate (FEROSUL) 325 (65 Fe) MG tablet Take 325 mg by mouth daily (with breakfast)    folic acid 0.8 MG CAPS Take 0.4 mg by mouth daily    levothyroxine (SYNTHROID/LEVOTHROID) 100 MCG tablet Take 100 mcg by mouth daily    Lidocaine (LIDOCARE) 4 % Patch Place 1 patch onto the skin every 24 hours To prevent lidocaine toxicity, patient should be patch free for 12 hrs daily.    melatonin 3 MG tablet Take 3 mg by mouth nightly as needed for sleep    metolazone (ZAROXOLYN) 5 MG tablet Take 5 mg by mouth daily as needed    naloxone (NARCAN) 0.4 MG/ML injection Inject 0.1-0.4 mg into the vein as needed for opioid reversal    nystatin (MYCOSTATIN) 109626 UNIT/GM external powder Apply topically 2 times daily    pantoprazole (PROTONIX) 2 mg/mL SUSP suspension Take 20 mLs (40 mg) by mouth every morning (before breakfast)    polyethylene glycol (MIRALAX) 17 g packet Take 17 g by mouth daily    potassium chloride ER (KLOR-CON M) 10 MEQ CR tablet Take 2 tablets by mouth daily with food    rOPINIRole (REQUIP) 1 MG tablet Take 1 mg by mouth At Bedtime    sennosides (SENOKOT) 8.6 MG tablet Take 1 tablet by mouth 2 times daily    warfarin ANTICOAGULANT (COUMADIN) 4 MG tablet Take 1 tablet (4 mg) by mouth daily 4 mg Monday, Wednesday, Friday and 6 mg the other 4 days     No current facility-administered medications for this visit.       ROS:  10 point ROS of systems including Constitutional, Eyes, Respiratory, Cardiovascular, Gastroenterology, Genitourinary, Integumentary, Musculoskeletal, Psychiatric were all negative except for pertinent positives noted in my HPI.    Vitals:  There were no vitals taken for this visit.  Exam:  GENERAL APPEARANCE:  in no distress,   -ENT: bright blood over right nostrils, point of bleeding over the medial surface.   RESP:   Unlabored breathing. CTA b/l in general but diminished over the bases  CV:  S1S2 audible, regular HR, no murmur appreciated.  Traces pedal edema.   ABDOMEN:  soft, NT/obese,  BS audible.   M/S:   no joint deformity noted on observation.   SKIN:  No rash noted on observation  NEURO:   No NFD appreciated on observation.   PSYCH:  affect and mood normal      Lab/Diagnostic data: Reviewed in the chart and EHR.        ASSESSMENT/PLAN:  ------------------------------  Chronic systolic heart failure (H)  Chronic atrial fibrillation (H)  Essential hypertension  Severe tricuspid regurgitation  Severe mitral regurgitation  History of aortic valve replacement  Current use of long term anticoagulation  Pacemaker  - cardiac wise appears compensated.   -- six hospitalization last year. Palliative care vs hospice care.   -  appears stable.  At risk for rehospitalization.   - at risk for death from cardiac arrhythmia (one in four die suddenly).   - on coumadin for CVS ppx.  See under epistaxis for INR    Query Cardiac cirrhosis  Query HUNT (nonalcoholic steatohepatitis)  Thrombocytopenia (H24)  Anemia, unspecified type  - no ascites while IP for aspiration. Query cardiac cirrhosis vs HUNT.   - avoid hepatotoxic meds/agents.   - GI follow up    Stage 3b chronic kidney disease (H)  -- Avoid nephrotoxic  medications. Renally dose medications. Monitor electrolytes, and dehydration status    Epistaxis, left side:  -no hx of epistaxis, picked on dry skin. We check INR is came back 4.2. on coumadin . Will hold coumadin, recheck in am  - we applied a pressure with gauze over the point of bleeding.. not severe enough to anticipate ED visit. If continues slowly, might give Vit K 5 mg.   - later on the day checked on the patient and bleeding had stopped.     Esophageal dysphagia:  - due for EGD. [facility] following.   - SLP prn      Orders:  - hold coumadin tonight  - INR in am  - apply pressure over left nostrils for epistaxis.        Electronically signed by:  Shahab Seals MD

## 2024-01-18 ENCOUNTER — TRANSITIONAL CARE UNIT VISIT (OUTPATIENT)
Dept: GERIATRICS | Facility: CLINIC | Age: 89
End: 2024-01-18
Payer: MEDICARE

## 2024-01-18 ENCOUNTER — LAB REQUISITION (OUTPATIENT)
Dept: LAB | Facility: CLINIC | Age: 89
End: 2024-01-18
Payer: MEDICARE

## 2024-01-18 VITALS
SYSTOLIC BLOOD PRESSURE: 122 MMHG | DIASTOLIC BLOOD PRESSURE: 72 MMHG | BODY MASS INDEX: 38.5 KG/M2 | TEMPERATURE: 98 F | HEIGHT: 73 IN | RESPIRATION RATE: 20 BRPM | HEART RATE: 71 BPM | WEIGHT: 290.5 LBS | OXYGEN SATURATION: 98 %

## 2024-01-18 DIAGNOSIS — I07.1 SEVERE TRICUSPID REGURGITATION: ICD-10-CM

## 2024-01-18 DIAGNOSIS — K75.81 NASH (NONALCOHOLIC STEATOHEPATITIS): ICD-10-CM

## 2024-01-18 DIAGNOSIS — I10 ESSENTIAL HYPERTENSION: ICD-10-CM

## 2024-01-18 DIAGNOSIS — Z95.2 HISTORY OF AORTIC VALVE REPLACEMENT: ICD-10-CM

## 2024-01-18 DIAGNOSIS — R29.6 REPEATED FALLS: ICD-10-CM

## 2024-01-18 DIAGNOSIS — R13.19 ESOPHAGEAL DYSPHAGIA: ICD-10-CM

## 2024-01-18 DIAGNOSIS — Z95.0 PACEMAKER: ICD-10-CM

## 2024-01-18 DIAGNOSIS — N18.32 STAGE 3B CHRONIC KIDNEY DISEASE (H): ICD-10-CM

## 2024-01-18 DIAGNOSIS — I48.20 CHRONIC ATRIAL FIBRILLATION (H): ICD-10-CM

## 2024-01-18 DIAGNOSIS — D69.6 THROMBOCYTOPENIA (H): ICD-10-CM

## 2024-01-18 DIAGNOSIS — I34.0 SEVERE MITRAL REGURGITATION: ICD-10-CM

## 2024-01-18 DIAGNOSIS — I50.22 CHRONIC SYSTOLIC HEART FAILURE (H): Primary | ICD-10-CM

## 2024-01-18 DIAGNOSIS — Z79.01 CURRENT USE OF LONG TERM ANTICOAGULATION: ICD-10-CM

## 2024-01-18 DIAGNOSIS — K76.1 CARDIAC CIRRHOSIS: ICD-10-CM

## 2024-01-18 DIAGNOSIS — D64.9 ANEMIA, UNSPECIFIED TYPE: ICD-10-CM

## 2024-01-18 DIAGNOSIS — R04.0 EPISTAXIS: ICD-10-CM

## 2024-01-18 DIAGNOSIS — D64.9 ANEMIA, UNSPECIFIED: ICD-10-CM

## 2024-01-18 PROCEDURE — 99305 1ST NF CARE MODERATE MDM 35: CPT | Performed by: FAMILY MEDICINE

## 2024-01-18 NOTE — LETTER
1/18/2024        RE: Chacorta Mendoza  [address] [facility] [place] [address]        [facility] GERIATRICS    PRIMARY CARE PROVIDER AND CLINIC:  GEORGES ARRIAZA, [address]  / [place] [address]  No chief complaint on file.     [place] Medical Record Number:  [id number]  Place of Service where encounter took place:  No question data found.    Chaocrta Mendoza  is a 88 year old  (1935), admitted to the above facility from  [facility] . Hospital stay 12/26/23 through 1/11/24..   HPI:    Pt hospitalized with CHF exacerbation, ascitics and pancytopenia in setting of cirrhosis, diuresed, and stated on PPI, has a follow up with [facility]    Today:  - CHF: sleeps in the  bed with head side slightly elevated. Reprots swelling in the legs is much better.   - GI: denies any concern  -Rehab: reports going on, walks using 2FWW with gagan belt on and wc behind him  - nose bleed: RN reports pt started having left sided nasal bleed, pt endorses he picked on his nose due to dryness. No hx of epistaxis     ===============================================  CODE STATUS/ADVANCE DIRECTIVES DISCUSSION:  No CPR- Do NOT Intubate    ALLERGIES:   Allergies   Allergen Reactions     Aspirin Unknown     Levofloxacin Muscle Pain (Myalgia)     Lisinopril Other (See Comments)     Changed to ARB due to rising Creatinine     Losartan Rash      PAST MEDICAL HISTORY: No past medical history on file.   PAST SURGICAL HISTORY:   has a past surgical history that includes Esophagoscopy, gastroscopy, duodenoscopy (EGD), combined (N/A, 5/28/2023) and Esophagoscopy, gastroscopy, duodenoscopy (EGD), combined (N/A, 5/29/2023).  FAMILY HISTORY: family history is not on file.  SOCIAL HISTORY:     Patient's living condition: lives alone    Post Discharge Medication Reconciliation Status:   MED REC REQUIRED  Post Medication Reconciliation Status: discharge medications reconciled and changed, per note/orders       Current Outpatient Medications    Medication Sig     atorvastatin (LIPITOR) 10 MG tablet Take 10 mg by mouth daily     bumetanide (BUMEX) 2 MG tablet Take 2 mg by mouth 2 times daily 2 tabs at 0800 and 1 tab at 1300     calcitRIOL (ROCALTROL) 0.25 MCG capsule Take 0.25 mcg by mouth daily     ferrous sulfate (FEROSUL) 325 (65 Fe) MG tablet Take 325 mg by mouth daily (with breakfast)     folic acid 0.8 MG CAPS Take 0.4 mg by mouth daily     levothyroxine (SYNTHROID/LEVOTHROID) 100 MCG tablet Take 100 mcg by mouth daily     Lidocaine (LIDOCARE) 4 % Patch Place 1 patch onto the skin every 24 hours To prevent lidocaine toxicity, patient should be patch free for 12 hrs daily.     melatonin 3 MG tablet Take 3 mg by mouth nightly as needed for sleep     metolazone (ZAROXOLYN) 5 MG tablet Take 5 mg by mouth daily as needed     naloxone (NARCAN) 0.4 MG/ML injection Inject 0.1-0.4 mg into the vein as needed for opioid reversal     nystatin (MYCOSTATIN) 404448 UNIT/GM external powder Apply topically 2 times daily     pantoprazole (PROTONIX) 2 mg/mL SUSP suspension Take 20 mLs (40 mg) by mouth every morning (before breakfast)     polyethylene glycol (MIRALAX) 17 g packet Take 17 g by mouth daily     potassium chloride ER (KLOR-CON M) 10 MEQ CR tablet Take 2 tablets by mouth daily with food     rOPINIRole (REQUIP) 1 MG tablet Take 1 mg by mouth At Bedtime     sennosides (SENOKOT) 8.6 MG tablet Take 1 tablet by mouth 2 times daily     warfarin ANTICOAGULANT (COUMADIN) 4 MG tablet Take 1 tablet (4 mg) by mouth daily 4 mg Monday, Wednesday, Friday and 6 mg the other 4 days     No current facility-administered medications for this visit.       ROS:  10 point ROS of systems including Constitutional, Eyes, Respiratory, Cardiovascular, Gastroenterology, Genitourinary, Integumentary, Musculoskeletal, Psychiatric were all negative except for pertinent positives noted in my HPI.    Vitals:  There were no vitals taken for this visit.  Exam:  GENERAL APPEARANCE:  in no  distress,   -ENT: bright blood over right nostrils, point of bleeding over the medial surface.   RESP:  Unlabored breathing. CTA b/l in general but diminished over the bases  CV:  S1S2 audible, regular HR, no murmur appreciated.  Traces pedal edema.   ABDOMEN:  soft, NT/obese,  BS audible.   M/S:   no joint deformity noted on observation.   SKIN:  No rash noted on observation  NEURO:   No NFD appreciated on observation.   PSYCH:  affect and mood normal      Lab/Diagnostic data: Reviewed in the chart and EHR.        ASSESSMENT/PLAN:  ------------------------------  Chronic systolic heart failure (H)  Chronic atrial fibrillation (H)  Essential hypertension  Severe tricuspid regurgitation  Severe mitral regurgitation  History of aortic valve replacement  Current use of long term anticoagulation  Pacemaker  - cardiac wise appears compensated.   -- six hospitalization last year. Palliative care vs hospice care.   -  appears stable.  At risk for rehospitalization.   - at risk for death from cardiac arrhythmia (one in four die suddenly).   - on coumadin for CVS ppx.  See under epistaxis for INR    Query Cardiac cirrhosis  Query HUNT (nonalcoholic steatohepatitis)  Thrombocytopenia (H24)  Anemia, unspecified type  - no ascites while IP for aspiration. Query cardiac cirrhosis vs HUNT.   - avoid hepatotoxic meds/agents.   - GI follow up    Stage 3b chronic kidney disease (H)  -- Avoid nephrotoxic  medications. Renally dose medications. Monitor electrolytes, and dehydration status    Epistaxis, left side:  -no hx of epistaxis, picked on dry skin. We check INR is came back 4.2. on coumadin . Will hold coumadin, recheck in am  - we applied a pressure with gauze over the point of bleeding.. not severe enough to anticipate ED visit. If continues slowly, might give Vit K 5 mg.   - later on the day checked on the patient and bleeding had stopped.     Esophageal dysphagia:  - due for EGD. [facility] following.   - SLP prn      Orders:  -  hold coumadin tonight  - INR in am  - apply pressure over left nostrils for epistaxis.       Electronically signed by:  Shahab Seals MD                     Sincerely,        Shahab Seals MD

## 2024-01-19 ENCOUNTER — TELEPHONE (OUTPATIENT)
Dept: GERIATRICS | Facility: CLINIC | Age: 89
End: 2024-01-19

## 2024-01-19 LAB
ANION GAP SERPL CALCULATED.3IONS-SCNC: 13 MMOL/L (ref 7–15)
BASOPHILS # BLD AUTO: 0 10E3/UL (ref 0–0.2)
BASOPHILS NFR BLD AUTO: 1 %
BUN SERPL-MCNC: 51 MG/DL (ref 8–23)
CALCIUM SERPL-MCNC: 9 MG/DL (ref 8.8–10.2)
CHLORIDE SERPL-SCNC: 100 MMOL/L (ref 98–107)
CREAT SERPL-MCNC: 1.84 MG/DL (ref 0.67–1.17)
DEPRECATED HCO3 PLAS-SCNC: 29 MMOL/L (ref 22–29)
EGFRCR SERPLBLD CKD-EPI 2021: 35 ML/MIN/1.73M2
EOSINOPHIL # BLD AUTO: 0.2 10E3/UL (ref 0–0.7)
EOSINOPHIL NFR BLD AUTO: 5 %
ERYTHROCYTE [DISTWIDTH] IN BLOOD BY AUTOMATED COUNT: 15.9 % (ref 10–15)
GLUCOSE SERPL-MCNC: 88 MG/DL (ref 70–99)
HCT VFR BLD AUTO: 31.8 % (ref 40–53)
HGB BLD-MCNC: 9.9 G/DL (ref 13.3–17.7)
IMM GRANULOCYTES # BLD: 0 10E3/UL
IMM GRANULOCYTES NFR BLD: 1 %
LYMPHOCYTES # BLD AUTO: 1.2 10E3/UL (ref 0.8–5.3)
LYMPHOCYTES NFR BLD AUTO: 31 %
MCH RBC QN AUTO: 30 PG (ref 26.5–33)
MCHC RBC AUTO-ENTMCNC: 31.1 G/DL (ref 31.5–36.5)
MCV RBC AUTO: 96 FL (ref 78–100)
MONOCYTES # BLD AUTO: 0.3 10E3/UL (ref 0–1.3)
MONOCYTES NFR BLD AUTO: 9 %
NEUTROPHILS # BLD AUTO: 2 10E3/UL (ref 1.6–8.3)
NEUTROPHILS NFR BLD AUTO: 53 %
NRBC # BLD AUTO: 0 10E3/UL
NRBC BLD AUTO-RTO: 0 /100
PLATELET # BLD AUTO: 139 10E3/UL (ref 150–450)
POTASSIUM SERPL-SCNC: 3.6 MMOL/L (ref 3.4–5.3)
RBC # BLD AUTO: 3.3 10E6/UL (ref 4.4–5.9)
SODIUM SERPL-SCNC: 142 MMOL/L (ref 135–145)
WBC # BLD AUTO: 3.7 10E3/UL (ref 4–11)

## 2024-01-19 PROCEDURE — P9603 ONE-WAY ALLOW PRORATED MILES: HCPCS | Performed by: NURSE PRACTITIONER

## 2024-01-19 PROCEDURE — 36415 COLL VENOUS BLD VENIPUNCTURE: CPT | Performed by: NURSE PRACTITIONER

## 2024-01-19 PROCEDURE — 80048 BASIC METABOLIC PNL TOTAL CA: CPT | Performed by: NURSE PRACTITIONER

## 2024-01-19 PROCEDURE — 85025 COMPLETE CBC W/AUTO DIFF WBC: CPT | Performed by: NURSE PRACTITIONER

## 2024-01-19 NOTE — TELEPHONE ENCOUNTER
[facility] Geriatrics Triage Nurse INR     Provider: EMILIO Potter CNP, DNP  Facility: [facility]  Facility Type:  TCU    Caller: Teresita  Call Back Number: [phone]  Reason for call: INR  Diagnosis/Goal: A. Fib    Todays INR: 5.3  Last INR 4.2 yesterday - coumadin held  Coumadin 6mg M/W/F and 4mg AOD.     Heparin/Lovenox:  No  Currently on ABX?: No  Other interacting medication:  None  Missed or refused doses: No    Verbal Order/Direction given by Provider: Hold Coumadin. Check INR and Hgb on 1/22/24    Provider Giving Order:  EMILIO Potter CNP, DNP    Verbal Order given to: Teresita Barrientos RN

## 2024-01-22 ENCOUNTER — TRANSITIONAL CARE UNIT VISIT (OUTPATIENT)
Dept: GERIATRICS | Facility: CLINIC | Age: 89
End: 2024-01-22
Payer: MEDICARE

## 2024-01-22 VITALS
HEIGHT: 73 IN | HEART RATE: 78 BPM | SYSTOLIC BLOOD PRESSURE: 122 MMHG | WEIGHT: 290 LBS | RESPIRATION RATE: 18 BRPM | BODY MASS INDEX: 38.43 KG/M2 | OXYGEN SATURATION: 98 % | TEMPERATURE: 97.8 F | DIASTOLIC BLOOD PRESSURE: 66 MMHG

## 2024-01-22 DIAGNOSIS — N18.4 STAGE 4 CHRONIC KIDNEY DISEASE (H): ICD-10-CM

## 2024-01-22 DIAGNOSIS — I50.22 CHRONIC SYSTOLIC HEART FAILURE (H): Primary | ICD-10-CM

## 2024-01-22 DIAGNOSIS — N18.31 TYPE 2 DIABETES MELLITUS WITH STAGE 3A CHRONIC KIDNEY DISEASE, WITHOUT LONG-TERM CURRENT USE OF INSULIN (H): ICD-10-CM

## 2024-01-22 DIAGNOSIS — I10 ESSENTIAL HYPERTENSION: ICD-10-CM

## 2024-01-22 DIAGNOSIS — E11.22 TYPE 2 DIABETES MELLITUS WITH STAGE 3A CHRONIC KIDNEY DISEASE, WITHOUT LONG-TERM CURRENT USE OF INSULIN (H): ICD-10-CM

## 2024-01-22 DIAGNOSIS — Z79.01 CURRENT USE OF LONG TERM ANTICOAGULATION: ICD-10-CM

## 2024-01-22 DIAGNOSIS — I48.20 CHRONIC ATRIAL FIBRILLATION (H): ICD-10-CM

## 2024-01-22 DIAGNOSIS — I07.1 SEVERE TRICUSPID REGURGITATION: ICD-10-CM

## 2024-01-22 PROBLEM — R13.10 DYSPHAGIA, UNSPECIFIED: Status: ACTIVE | Noted: 2023-06-07

## 2024-01-22 PROBLEM — J69.0 PNEUMONITIS DUE TO INHALATION OF FOOD AND VOMIT (H): Status: ACTIVE | Noted: 2023-06-06

## 2024-01-22 PROBLEM — E11.9 TYPE 2 DIABETES MELLITUS WITHOUT COMPLICATIONS (H): Status: ACTIVE | Noted: 2023-06-06

## 2024-01-22 PROBLEM — M47.816 SPONDYLOSIS WITHOUT MYELOPATHY OR RADICULOPATHY, LUMBAR REGION: Status: ACTIVE | Noted: 2023-06-06

## 2024-01-22 PROBLEM — K20.90 ESOPHAGITIS, UNSPECIFIED WITHOUT BLEEDING: Status: ACTIVE | Noted: 2023-06-07

## 2024-01-22 PROBLEM — M62.81 MUSCLE WEAKNESS (GENERALIZED): Status: ACTIVE | Noted: 2023-06-07

## 2024-01-22 PROBLEM — E78.00 PURE HYPERCHOLESTEROLEMIA, UNSPECIFIED: Status: ACTIVE | Noted: 2023-06-06

## 2024-01-22 PROBLEM — I50.23 ACUTE ON CHRONIC HFREF (HEART FAILURE WITH REDUCED EJECTION FRACTION) (H): Status: ACTIVE | Noted: 2023-11-26

## 2024-01-22 PROBLEM — D69.6 THROMBOCYTOPENIA, UNSPECIFIED (H): Status: ACTIVE | Noted: 2023-06-07

## 2024-01-22 PROBLEM — Z95.2 PRESENCE OF PROSTHETIC HEART VALVE: Status: ACTIVE | Noted: 2023-06-07

## 2024-01-22 PROBLEM — M10.9 GOUT, UNSPECIFIED: Status: ACTIVE | Noted: 2023-06-07

## 2024-01-22 PROBLEM — I87.2 VENOUS INSUFFICIENCY (CHRONIC) (PERIPHERAL): Status: ACTIVE | Noted: 2023-06-06

## 2024-01-22 PROBLEM — R13.14 DYSPHAGIA, PHARYNGOESOPHAGEAL PHASE: Status: ACTIVE | Noted: 2023-06-07

## 2024-01-22 PROBLEM — R57.9 SHOCK, UNSPECIFIED (H): Status: RESOLVED | Noted: 2023-06-06 | Resolved: 2024-01-22

## 2024-01-22 PROBLEM — R26.2 DIFFICULTY IN WALKING, NOT ELSEWHERE CLASSIFIED: Status: ACTIVE | Noted: 2023-06-07

## 2024-01-22 PROBLEM — G47.33 OBSTRUCTIVE SLEEP APNEA (ADULT) (PEDIATRIC): Status: ACTIVE | Noted: 2023-06-07

## 2024-01-22 PROBLEM — R57.9 SHOCK, UNSPECIFIED (H): Status: ACTIVE | Noted: 2023-06-06

## 2024-01-22 PROBLEM — E66.01 MORBID (SEVERE) OBESITY DUE TO EXCESS CALORIES (H): Status: ACTIVE | Noted: 2023-06-06

## 2024-01-22 PROBLEM — I13.0 HYPERTENSIVE HEART AND CHRONIC KIDNEY DISEASE WITH HEART FAILURE AND STAGE 1 THROUGH STAGE 4 CHRONIC KIDNEY DISEASE, OR UNSPECIFIED CHRONIC KIDNEY DISEASE (H): Chronic | Status: ACTIVE | Noted: 2023-08-03
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
## 2024-01-22 PROBLEM — Z73.89 OTHER PROBLEMS RELATED TO LIFE MANAGEMENT DIFFICULTY: Status: ACTIVE | Noted: 2023-06-07

## 2024-01-22 PROBLEM — J20.9 ACUTE BRONCHITIS, UNSPECIFIED: Status: ACTIVE | Noted: 2023-06-06

## 2024-01-22 PROBLEM — I95.9 HYPOTENSION (ARTERIAL): Status: ACTIVE | Noted: 2023-11-21

## 2024-01-22 PROBLEM — S50.862D: Status: ACTIVE | Noted: 2023-06-07

## 2024-01-22 PROBLEM — R62.7 ADULT FAILURE TO THRIVE: Status: ACTIVE | Noted: 2023-06-06

## 2024-01-22 PROBLEM — G93.40 ENCEPHALOPATHY ACUTE: Status: ACTIVE | Noted: 2023-11-25

## 2024-01-22 PROBLEM — I50.42 CHRONIC COMBINED SYSTOLIC (CONGESTIVE) AND DIASTOLIC (CONGESTIVE) HEART FAILURE (H): Status: ACTIVE | Noted: 2023-06-06

## 2024-01-22 PROBLEM — Z95.0 PRESENCE OF CARDIAC PACEMAKER: Status: ACTIVE | Noted: 2023-06-07

## 2024-01-22 PROBLEM — E03.9 HYPOTHYROIDISM, UNSPECIFIED: Status: ACTIVE | Noted: 2023-06-06

## 2024-01-22 PROCEDURE — 99309 SBSQ NF CARE MODERATE MDM 30: CPT | Performed by: NURSE PRACTITIONER
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
## 2024-01-24 PROBLEM — I50.23 ACUTE ON CHRONIC HFREF (HEART FAILURE WITH REDUCED EJECTION FRACTION) (H): Status: RESOLVED | Noted: 2023-11-26 | Resolved: 2024-01-24

## 2024-01-29 ENCOUNTER — TELEPHONE (OUTPATIENT)
Dept: GERIATRICS | Facility: CLINIC | Age: 89
End: 2024-01-29

## 2024-01-29 NOTE — TELEPHONE ENCOUNTER
[facility] Geriatrics Triage Nurse INR     Provider: EMILIO Potter CNP, YENIFER  Facility: [facility]  Facility Type:  TCU    Caller: Teresita  Call Back Number: [phone]  Reason for call: INR  Diagnosis/Goal: A. Fib    Todays INR: 2.6  Last INR 2.0 (1/26), 4 mg po daily.      Heparin/Lovenox:  No  Currently on ABX?: No  Other interacting medication:  None  Missed or refused doses: No    Verbal Order/Direction given by Provider: Coumadin 6 mg PO on M and 6 mg AOD. Repeat INR 2/2/24.    Provider Giving Order:  EMILIO Milian CNP    Verbal Order given to: Teresita Jauregui RN

## 2024-02-05 ENCOUNTER — TRANSITIONAL CARE UNIT VISIT (OUTPATIENT)
Dept: GERIATRICS | Facility: CLINIC | Age: 89
End: 2024-02-05
Payer: MEDICARE

## 2024-02-05 VITALS
HEART RATE: 98 BPM | OXYGEN SATURATION: 95 % | WEIGHT: 296.7 LBS | SYSTOLIC BLOOD PRESSURE: 112 MMHG | BODY MASS INDEX: 39.32 KG/M2 | DIASTOLIC BLOOD PRESSURE: 72 MMHG | TEMPERATURE: 97.7 F | HEIGHT: 73 IN | RESPIRATION RATE: 18 BRPM

## 2024-02-05 DIAGNOSIS — I48.20 CHRONIC ATRIAL FIBRILLATION (H): ICD-10-CM

## 2024-02-05 DIAGNOSIS — E11.22 TYPE 2 DIABETES MELLITUS WITH STAGE 3A CHRONIC KIDNEY DISEASE, WITHOUT LONG-TERM CURRENT USE OF INSULIN (H): ICD-10-CM

## 2024-02-05 DIAGNOSIS — N18.4 STAGE 4 CHRONIC KIDNEY DISEASE (H): ICD-10-CM

## 2024-02-05 DIAGNOSIS — I07.1 SEVERE TRICUSPID REGURGITATION: ICD-10-CM

## 2024-02-05 DIAGNOSIS — N18.31 TYPE 2 DIABETES MELLITUS WITH STAGE 3A CHRONIC KIDNEY DISEASE, WITHOUT LONG-TERM CURRENT USE OF INSULIN (H): ICD-10-CM

## 2024-02-05 DIAGNOSIS — I10 ESSENTIAL HYPERTENSION: ICD-10-CM

## 2024-02-05 DIAGNOSIS — I50.23 ACUTE ON CHRONIC HFREF (HEART FAILURE WITH REDUCED EJECTION FRACTION) (H): Primary | ICD-10-CM

## 2024-02-05 PROCEDURE — 99309 SBSQ NF CARE MODERATE MDM 30: CPT | Performed by: NURSE PRACTITIONER

## 2024-02-05 NOTE — PROGRESS NOTES
"[facility] GERIATRIC SERVICE  Episodic/Acute/Follow-Up  [place] MRN: [id number]. Place of Service where encounter took place:  [facility] ([facility]) [4002]   Chief Complaint   Patient presents with    RECHECK    HPI: Chacorta Mendoza  is a 88 year old (1935), who is being seen today for an episodic care visit. Today's concern is:    Chacorta seen on follow-up as he continues to rehab in [facility].  Today, he says he still gets short of breath with activity, and sometimes even when he is sitting in his recliner.  He denies chest pain or palpitations.  He says his appetite is \"too good\" and he denies any nausea or heartburn.  He says his bowels are moving well and his bladder is moving all the time because of his water pills.  He denies any trouble with sleeping, and says his mood is pretty good.  He denies any headaches, but gets lightheaded sometimes.    Past Medical and Surgical History reviewed in Epic today.  MEDICATIONS:  Current Outpatient Medications   Medication Sig Dispense Refill    atorvastatin (LIPITOR) 10 MG tablet Take 10 mg by mouth daily      bumetanide (BUMEX) 2 MG tablet Take 2 mg by mouth 2 times daily 2 tabs at 0800 and 1 tab at 1300      calcitRIOL (ROCALTROL) 0.25 MCG capsule Take 0.25 mcg by mouth daily      ferrous sulfate (FEROSUL) 325 (65 Fe) MG tablet Take 325 mg by mouth daily (with breakfast)      levothyroxine (SYNTHROID/LEVOTHROID) 100 MCG tablet Take 100 mcg by mouth daily      Lidocaine (LIDOCARE) 4 % Patch Place 1 patch onto the skin every 24 hours To prevent lidocaine toxicity, patient should be patch free for 12 hrs daily.      metolazone (ZAROXOLYN) 5 MG tablet Take 5 mg by mouth daily as needed      naloxone (NARCAN) 0.4 MG/ML injection Inject 0.1-0.4 mg into the vein as needed for opioid reversal      nystatin (MYCOSTATIN) 323102 UNIT/GM external powder Apply topically 2 times daily      pantoprazole (PROTONIX) 2 mg/mL SUSP suspension Take 20 mLs (40 mg) by mouth every morning " "(before breakfast)      polyethylene glycol (MIRALAX) 17 g packet Take 17 g by mouth daily      potassium chloride ER (KLOR-CON M) 10 MEQ CR tablet Take 2 tablets by mouth daily with food      rOPINIRole (REQUIP) 1 MG tablet Take 1 mg by mouth At Bedtime      sennosides (SENOKOT) 8.6 MG tablet Take 1 tablet by mouth 2 times daily      warfarin ANTICOAGULANT (COUMADIN) 4 MG tablet Take 1 tablet (4 mg) by mouth daily 4 mg Monday, Wednesday, Friday and 6 mg the other 4 days       Objective: /72   Pulse 98   Temp 97.7  F (36.5  C)   Resp 18   Ht 1.854 m (6' 1\")   Wt 134.6 kg (296 lb 11.2 oz)   SpO2 95%   BMI 39.14 kg/m    Exam:  GENERAL APPEARANCE: Alert, in no distress, cooperative.   RESP: Respiratory effort fair, no respiratory distress, Lung sounds clear/diminished. On RA.   CV: Auscultation of heart reveals S1, S2, rate controlled and rhythm irregular, no murmur, no rub or gallop, Edema 2+ BLE. Peripheral pulses are 2+.  PSYCH: Insight, judgement, and memory are impaired at baseline, affect and mood are happy/engaged.    Labs: Labs done in facility are in EPIC. Please refer to them using [employer] Everywhere.    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 Tenuous.  Provider reviewed records from facility, and interpreted most recent imaging/lab work, and vital signs.  Noting weight increase of 5 pounds in the last week.  Chart review shows most diuretic doses have been given, with exception of 1 over the weekend when patient thought he had something to go to and did not want to be running to the bathroom.  As needed metolazone dosing is available, and provider will direct nursing to utilize this.  No " ongoing pain, will discontinue oxycodone.  Appetite is very good, no known folic acid deficiency.  Will discontinue.  INR continues to be slightly supratherapeutic at 3.1.  Last INR was 3.8 and this has come down nicely with current interventions.  Will dose Coumadin as noted below and recheck INR in 2 days.  No sleep disturbance, and no use of as needed melatonin.  Will discontinue.  Given the additional need for diuresis, considering CHF partially exacerbated.  Chacorta is already on a significant amount of diuretics, and this can impair kidney function and electrolytes.  Will closely monitor these with unique testing as noted below.  Given previous anemia, will also obtain CBC.  Follow up w/in 1 week or as needed.    Orders:  On 2/6, give extra Metolazone 30 minutes before Bumex. Dx: CHF.   Coumadin 2mg x1 today.   Coumadin 4mg PO x1 tomorrow.   Rechek INR x1 on 2/7.  Discontinue Oxycodone.  Discontinue Folic acid.  Discontinue Melatonin.  CBC, BMP x1 on 2/7. Dx: CHF.     Electronically signed by:  Dr. Benita Lowe, APRN CNP DNP

## 2024-02-05 NOTE — LETTER
"    2/5/2024        RE: Chacorta Mendoza  [address] [facility] [address]        [facility] GERIATRIC SERVICE  Episodic/Acute/Follow-Up  [place] MRN: [id number]. Place of Service where encounter took place:  [facility] ([facility]) [[id number]]   Chief Complaint   Patient presents with     RECHECK    HPI: Chacorta Mendoza  is a 88 year old (1935), who is being seen today for an episodic care visit. Today's concern is:    Chacorta seen on follow-up as he [place] rehab [place].  Today, he says he still gets short of breath with activity, and sometimes even when he is sitting in his recliner.  He denies chest pain or palpitations.  He says his appetite is \"too good\" and he denies any nausea or heartburn.  He says his bowels are moving well and his bladder is moving all the time because of his water pills.  He denies any trouble with sleeping, and says his mood is pretty good.  He denies any headaches, but gets lightheaded sometimes.    Past Medical and Surgical History reviewed in Epic today.  MEDICATIONS:  Current Outpatient Medications   Medication Sig Dispense Refill     atorvastatin (LIPITOR) 10 MG tablet Take 10 mg by mouth daily       bumetanide (BUMEX) 2 MG tablet Take 2 mg by mouth 2 times daily 2 tabs at 0800 and 1 tab at 1300       calcitRIOL (ROCALTROL) 0.25 MCG capsule Take 0.25 mcg by mouth daily       ferrous sulfate (FEROSUL) 325 (65 Fe) MG tablet Take 325 mg by mouth daily (with breakfast)       levothyroxine (SYNTHROID/LEVOTHROID) 100 MCG tablet Take 100 mcg by mouth daily       Lidocaine (LIDOCARE) 4 % Patch Place 1 patch onto the skin every 24 hours To prevent lidocaine toxicity, patient should be patch free for 12 hrs daily.       metolazone (ZAROXOLYN) 5 MG tablet Take 5 mg by mouth daily as needed       naloxone (NARCAN) 0.4 MG/ML injection Inject 0.1-0.4 mg into the vein as needed for opioid reversal       nystatin (MYCOSTATIN) 377313 UNIT/GM external powder Apply topically 2 times daily   " "    pantoprazole (PROTONIX) 2 mg/mL SUSP suspension Take 20 mLs (40 mg) by mouth every morning (before breakfast)       polyethylene glycol (MIRALAX) 17 g packet Take 17 g by mouth daily       potassium chloride ER (KLOR-CON M) 10 MEQ CR tablet Take 2 tablets by mouth daily with food       rOPINIRole (REQUIP) 1 MG tablet Take 1 mg by mouth At Bedtime       sennosides (SENOKOT) 8.6 MG tablet Take 1 tablet by mouth 2 times daily       warfarin ANTICOAGULANT (COUMADIN) 4 MG tablet Take 1 tablet (4 mg) by mouth daily 4 mg Monday, Wednesday, Friday and 6 mg the other 4 days       Objective: /72   Pulse 98   Temp 97.7  F (36.5  C)   Resp 18   Ht 1.854 m (6' 1\")   Wt 134.6 kg (296 lb 11.2 oz)   SpO2 95%   BMI 39.14 kg/m    Exam:  GENERAL APPEARANCE: Alert, in no distress, cooperative.   RESP: Respiratory effort fair, no respiratory distress, Lung sounds clear/diminished. On RA.   CV: Auscultation of heart reveals S1, S2, rate controlled and rhythm irregular, no murmur, no rub or gallop, Edema 2+ BLE. Peripheral pulses are 2+.  PSYCH: Insight, judgement, and memory are impaired at baseline, affect and mood are happy/engaged.    Labs: Labs done in facility are in EPIC. Please refer to them using [employer]/Care Everywhere.    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 Tenuous.  Provider reviewed records from facility, and interpreted most recent imaging/lab work, and vital signs.  Noting weight increase of 5 pounds in the last week.  Chart review shows most diuretic doses have been given, with exception of 1 over the weekend when patient thought he had something to go to and did not want to be running to the bathroom.  As " needed metolazone dosing is available, and provider will direct nursing to utilize this.  No ongoing pain, will discontinue oxycodone.  Appetite is very good, no known folic acid deficiency.  Will discontinue.  INR continues to be slightly supratherapeutic at 3.1.  Last INR was 3.8 and this has come down nicely with current interventions.  Will dose Coumadin as noted below and recheck INR in 2 days.  No sleep disturbance, and no use of as needed melatonin.  Will discontinue.  Given the additional need for diuresis, considering CHF partially exacerbated.  Chacorta is already on a significant amount of diuretics, and this can impair kidney function and electrolytes.  Will closely monitor these with unique testing as noted below.  Given previous anemia, will also obtain CBC.  Follow up w/in 1 week or as needed.    Orders:  On 2/6, give extra Metolazone 30 minutes before Bumex. Dx: CHF.   Coumadin 2mg x1 today.   Coumadin 4mg PO x1 tomorrow.   Rechek INR x1 on 2/7.  Discontinue Oxycodone.  Discontinue Folic acid.  Discontinue Melatonin.  CBC, BMP x1 on 2/7. Dx: CHF.     Electronically signed by:  Dr. Benita Lowe, APRN CNP DNP        Sincerely,        EMILIO Dwyer CNP

## 2024-02-06 ENCOUNTER — LAB REQUISITION (OUTPATIENT)
Dept: LAB | Facility: CLINIC | Age: 89
End: 2024-02-06
Payer: MEDICARE

## 2024-02-06 DIAGNOSIS — I50.32 CHRONIC DIASTOLIC (CONGESTIVE) HEART FAILURE (H): ICD-10-CM

## 2024-02-07 ENCOUNTER — TELEPHONE (OUTPATIENT)
Dept: GERIATRICS | Facility: CLINIC | Age: 89
End: 2024-02-07
Payer: MEDICARE

## 2024-02-07 LAB
ANION GAP SERPL CALCULATED.3IONS-SCNC: 18 MMOL/L (ref 7–15)
BUN SERPL-MCNC: 67.7 MG/DL (ref 8–23)
CALCIUM SERPL-MCNC: 10 MG/DL (ref 8.8–10.2)
CHLORIDE SERPL-SCNC: 89 MMOL/L (ref 98–107)
CREAT SERPL-MCNC: 2.2 MG/DL (ref 0.67–1.17)
DEPRECATED HCO3 PLAS-SCNC: 31 MMOL/L (ref 22–29)
EGFRCR SERPLBLD CKD-EPI 2021: 28 ML/MIN/1.73M2
ERYTHROCYTE [DISTWIDTH] IN BLOOD BY AUTOMATED COUNT: 15.8 % (ref 10–15)
GLUCOSE SERPL-MCNC: 174 MG/DL (ref 70–99)
HCT VFR BLD AUTO: 33.2 % (ref 40–53)
HGB BLD-MCNC: 10.3 G/DL (ref 13.3–17.7)
MCH RBC QN AUTO: 29 PG (ref 26.5–33)
MCHC RBC AUTO-ENTMCNC: 31 G/DL (ref 31.5–36.5)
MCV RBC AUTO: 94 FL (ref 78–100)
PLATELET # BLD AUTO: 169 10E3/UL (ref 150–450)
POTASSIUM SERPL-SCNC: 3.2 MMOL/L (ref 3.4–5.3)
RBC # BLD AUTO: 3.55 10E6/UL (ref 4.4–5.9)
SODIUM SERPL-SCNC: 138 MMOL/L (ref 135–145)
WBC # BLD AUTO: 5.9 10E3/UL (ref 4–11)

## 2024-02-07 PROCEDURE — P9604 ONE-WAY ALLOW PRORATED TRIP: HCPCS | Performed by: FAMILY MEDICINE

## 2024-02-07 PROCEDURE — 80048 BASIC METABOLIC PNL TOTAL CA: CPT | Performed by: FAMILY MEDICINE

## 2024-02-07 PROCEDURE — 85027 COMPLETE CBC AUTOMATED: CPT | Performed by: FAMILY MEDICINE

## 2024-02-07 PROCEDURE — 36415 COLL VENOUS BLD VENIPUNCTURE: CPT | Performed by: FAMILY MEDICINE

## 2024-02-07 NOTE — TELEPHONE ENCOUNTER
[facility] Geriatrics Triage Nurse INR     Provider: EMILIO Potter CNP, DNP  Facility: [facility]  Facility Type:  TCU    Caller: Teresita  Call Back Number: [phone]  Reason for call: INR  Diagnosis/Goal: A. Fib    Todays INR: 2.8  Last INR 3.1 on 2/5 - 2mg x 1 then 4 mg  2.6 on 1/29 - 6 mg M and 4 mg AOD     Heparin/Lovenox:  No  Currently on ABX?: No  Other interacting medication:  None  Missed or refused doses: No    Addressed CBC and BMP as well:  1. KCL 20mEq PO Qday x 3 days. Dx: hypokalemia. Ok to give on top of regular KCL dosing (which is BID right now). This dip is likely 2/2 extra Metolazone given yesterday.  2. Recheck BMP x1 on 2/12. Dx: hypokalemia/CKD.    Verbal Order/Direction given by Provider: Coumadin 4 mg PO daily. Check INR on 2/12/24.    Provider Giving Order:  EMILIO Potter CNP, DNP    Verbal Order given to: Teresita Barrientos RN

## 2024-02-12 ENCOUNTER — TRANSITIONAL CARE UNIT VISIT (OUTPATIENT)
Dept: GERIATRICS | Facility: CLINIC | Age: 89
End: 2024-02-12
Payer: MEDICARE

## 2024-02-12 VITALS
WEIGHT: 282.6 LBS | HEART RATE: 61 BPM | HEIGHT: 73 IN | SYSTOLIC BLOOD PRESSURE: 117 MMHG | DIASTOLIC BLOOD PRESSURE: 65 MMHG | RESPIRATION RATE: 13 BRPM | TEMPERATURE: 97.7 F | OXYGEN SATURATION: 92 % | BODY MASS INDEX: 37.46 KG/M2

## 2024-02-12 DIAGNOSIS — I07.1 SEVERE TRICUSPID REGURGITATION: ICD-10-CM

## 2024-02-12 DIAGNOSIS — N18.4 STAGE 4 CHRONIC KIDNEY DISEASE (H): ICD-10-CM

## 2024-02-12 DIAGNOSIS — I50.23 ACUTE ON CHRONIC HFREF (HEART FAILURE WITH REDUCED EJECTION FRACTION) (H): Primary | ICD-10-CM

## 2024-02-12 DIAGNOSIS — R04.0 EPISTAXIS: ICD-10-CM

## 2024-02-12 DIAGNOSIS — I48.20 CHRONIC ATRIAL FIBRILLATION (H): ICD-10-CM

## 2024-02-12 PROCEDURE — 99310 SBSQ NF CARE HIGH MDM 45: CPT | Performed by: NURSE PRACTITIONER

## 2024-02-12 NOTE — LETTER
2/12/2024        RE: Chacorta Mendoza  [address] [facility] [address]        [facility] GERIATRIC SERVICE  Episodic/Acute/Follow-Up  [place] MRN: [id number]. Place of Service where encounter took place:  [facility] ([place]) [[id number]]   Chief Complaint   Patient presents with     RECHECK    HPI: Chacorta Mendoza  is a 88 year old (1935), who is being seen today for an episodic care visit. Today's concern is:    Chacorta seen today on routine follow-up as he continues to rehab in [facility]CU.  Over the weekend, nursing reported several episodes of epistaxis, and though spotcheck INRs were within defined limits, Coumadin was held.  Epistaxis was stopped with compression, ice, and positioning.  Last week, Chacorta also got an extra dose of metolazone given his significant weight increase and ongoing shortness of breath.    Today, Chacorta reports that he had a really hard night.  He did not sleep very well and he has been very tired all day.  He denies fever or chills, but says he still does get shortness of breath, even sometimes at rest.  He denies a new cough, chest pain, palpitations.  He says his appetite is very good and he is not having any nausea or heartburn.  He says he does get lightheaded from time to time, but does not have headaches.  He denies constipation or diarrhea, and explicitly denies any dysuria or frequency (other than the normal from his diuresis).  He feels his lower extremity edema is improved.    Past Medical and Surgical History reviewed in Epic today.  MEDICATIONS:  Current Outpatient Medications   Medication Sig Dispense Refill     atorvastatin (LIPITOR) 10 MG tablet Take 10 mg by mouth daily       bumetanide (BUMEX) 2 MG tablet Take 2 mg by mouth 2 times daily 2 tabs at 0800 and 1 tab at 1300       calcitRIOL (ROCALTROL) 0.25 MCG capsule Take 0.25 mcg by mouth daily       ferrous sulfate (FEROSUL) 325 (65 Fe) MG tablet Take 325 mg by mouth daily (with breakfast)       levothyroxine  "(SYNTHROID/LEVOTHROID) 100 MCG tablet Take 100 mcg by mouth daily       Lidocaine (LIDOCARE) 4 % Patch Place 1 patch onto the skin every 24 hours To prevent lidocaine toxicity, patient should be patch free for 12 hrs daily.       metolazone (ZAROXOLYN) 5 MG tablet Take 5 mg by mouth daily as needed       naloxone (NARCAN) 0.4 MG/ML injection Inject 0.1-0.4 mg into the vein as needed for opioid reversal       nystatin (MYCOSTATIN) 043829 UNIT/GM external powder Apply topically 2 times daily       pantoprazole (PROTONIX) 2 mg/mL SUSP suspension Take 20 mLs (40 mg) by mouth every morning (before breakfast)       polyethylene glycol (MIRALAX) 17 g packet Take 17 g by mouth daily       potassium chloride ER (KLOR-CON M) 10 MEQ CR tablet Take 2 tablets by mouth daily with food       rOPINIRole (REQUIP) 1 MG tablet Take 1 mg by mouth At Bedtime       sennosides (SENOKOT) 8.6 MG tablet Take 1 tablet by mouth 2 times daily       warfarin ANTICOAGULANT (COUMADIN) 4 MG tablet Take 1 tablet (4 mg) by mouth daily 4 mg Monday, Wednesday, Friday and 6 mg the other 4 days       Objective: /65   Pulse 61   Temp 97.7  F (36.5  C)   Resp 13   Ht 1.854 m (6' 1\")   Wt 128.2 kg (282 lb 9.6 oz)   SpO2 92%   BMI 37.28 kg/m    Weights:    Exam:  GENERAL APPEARANCE: Alert, in no distress, cooperative.   RESP: Respiratory effort good, no respiratory distress, Lung sounds clear/diminished. On RA.   CV: Auscultation of heart reveals S1, S2, rate controlled and rhythm irregular, no murmur, no rub or gallop, Edema 1-2+ BLE. Peripheral pulses are 2+.  PSYCH: Insight, judgement, and memory are impaired at baseline, affect and mood are happy/engaged.    Labs: Labs done in facility are in EPIC. Please refer to them using [employer]/Care Everywhere.    ASSESSMENT/PLAN:  Acute on chronic HFrEF (heart failure with reduced ejection fraction) (H)  Severe tricuspid regurgitation  Chronic atrial fibrillation (H)  Stage 4 chronic kidney disease " (H)  Epistaxis  Acute on chronic. Complex/Tenuous.   Provider reviewed records from facility, and interpreted most recent imaging/lab work, and vital signs.  Provider coordinated care with nursing and rehabilitation services.  Apparently, Chacorta had a hard time in therapy this morning, and has not been acting like himself.  He feels that this is related to his lack of sleep from last night, but given his very tenuous status it would be wise to track his renal function and electrolytes.  Also noting slight hypokalemia last week which required supplement.  Will trend this as well.  There does not appear to be any infectious or toxic appearance, but if he does not improve in the next couple days we may want to consider a UA/UC.  No epistaxis today, and no repeat since the 2 episodes which were reported over the weekend.  INR is therapeutic at 2.9.  Will dose Coumadin as noted below, but carefully trend given fluctuations noted last week.  For epistaxis, will add a saline nasal spray to prevent dryness.  Noting CHF exacerbation was likely prevented, and weight has trended down.  Chacorta is very chronically ill, and would qualify for hospice services.  His discharge disposition remains complex secondary to a social issue with his son.  He may benefit from more cardiology follow-up or core clinic consultation if his CHF continues to be problematic/exacerbate.  Follow up w/in 1 week or as needed.    Orders:  Coumadin 2mg PO Qday.  Recheck INR x1 on 2/14. Dx: afib.  Recheck BMP x1 on 2/14. Dx: hypokalemia.   Saline nasal spray, 2 sprays each nostril BID. Dx: epistaxis.      Electronically signed by:  EMILIO Ramsey CNP DNP        Sincerely,        EMILIO Dwyer CNP

## 2024-02-12 NOTE — PROGRESS NOTES
[facility] GERIATRIC SERVICE  Episodic/Acute/Follow-Up  [place] MRN: [id number]. Place of Service where encounter took place:  [facility] [place]) [[id number]]   Chief Complaint   Patient presents with    RECHECK    HPI: Chacorta Mendoza  is a 88 year old (1935), who is being seen today for an episodic care visit. Today's concern is:    Chacorta seen today on routine follow-up as he continues to rehab in TCU.  Over the weekend, nursing reported several episodes of epistaxis, and though spotcheck INRs were within defined limits, Coumadin was held.  Epistaxis was stopped with compression, ice, and positioning.  Last week, Chacorta also got an extra dose of metolazone given his significant weight increase and ongoing shortness of breath.    Today, Chacorta reports that he had a really hard night.  He did not sleep very well and he has been very tired all day.  He denies fever or chills, but says he still does get shortness of breath, even sometimes at rest.  He denies a new cough, chest pain, palpitations.  He says his appetite is very good and he is not having any nausea or heartburn.  He says he does get lightheaded from time to time, but does not have headaches.  He denies constipation or diarrhea, and explicitly denies any dysuria or frequency (other than the normal from his diuresis).  He feels his lower extremity edema is improved.    Past Medical and Surgical History reviewed in Epic today.  MEDICATIONS:  Current Outpatient Medications   Medication Sig Dispense Refill    atorvastatin (LIPITOR) 10 MG tablet Take 10 mg by mouth daily      bumetanide (BUMEX) 2 MG tablet Take 2 mg by mouth 2 times daily 2 tabs at 0800 and 1 tab at 1300      calcitRIOL (ROCALTROL) 0.25 MCG capsule Take 0.25 mcg by mouth daily      ferrous sulfate (FEROSUL) 325 (65 Fe) MG tablet Take 325 mg by mouth daily (with breakfast)      levothyroxine (SYNTHROID/LEVOTHROID) 100 MCG tablet Take 100 mcg by mouth daily      Lidocaine (LIDOCARE) 4 % Patch  "Place 1 patch onto the skin every 24 hours To prevent lidocaine toxicity, patient should be patch free for 12 hrs daily.      metolazone (ZAROXOLYN) 5 MG tablet Take 5 mg by mouth daily as needed      naloxone (NARCAN) 0.4 MG/ML injection Inject 0.1-0.4 mg into the vein as needed for opioid reversal      nystatin (MYCOSTATIN) 881165 UNIT/GM external powder Apply topically 2 times daily      pantoprazole (PROTONIX) 2 mg/mL SUSP suspension Take 20 mLs (40 mg) by mouth every morning (before breakfast)      polyethylene glycol (MIRALAX) 17 g packet Take 17 g by mouth daily      potassium chloride ER (KLOR-CON M) 10 MEQ CR tablet Take 2 tablets by mouth daily with food      rOPINIRole (REQUIP) 1 MG tablet Take 1 mg by mouth At Bedtime      sennosides (SENOKOT) 8.6 MG tablet Take 1 tablet by mouth 2 times daily      warfarin ANTICOAGULANT (COUMADIN) 4 MG tablet Take 1 tablet (4 mg) by mouth daily 4 mg Monday, Wednesday, Friday and 6 mg the other 4 days       Objective: /65   Pulse 61   Temp 97.7  F (36.5  C)   Resp 13   Ht 1.854 m (6' 1\")   Wt 128.2 kg (282 lb 9.6 oz)   SpO2 92%   BMI 37.28 kg/m    Weights:    Exam:  GENERAL APPEARANCE: Alert, in no distress, cooperative.   RESP: Respiratory effort good, no respiratory distress, Lung sounds clear/diminished. On RA.   CV: Auscultation of heart reveals S1, S2, rate controlled and rhythm irregular, no murmur, no rub or gallop, Edema 1-2+ BLE. Peripheral pulses are 2+.  PSYCH: Insight, judgement, and memory are impaired at baseline, affect and mood are happy/engaged.    Labs: Labs done in facility are in EPIC. Please refer to them using [email or web address] Everywhere.    ASSESSMENT/PLAN:  Acute on chronic HFrEF (heart failure with reduced ejection fraction) (H)  Severe tricuspid regurgitation  Chronic atrial fibrillation (H)  Stage 4 chronic kidney disease (H)  Epistaxis  Acute on chronic. Complex/Tenuous.   Provider reviewed records from facility, and interpreted most " recent imaging/lab work, and vital signs.  Provider coordinated care with nursing and rehabilitation services.  Apparently, Chacorta had a hard time in therapy this morning, and has not been acting like himself.  He feels that this is related to his lack of sleep from last night, but given his very tenuous status it would be wise to track his renal function and electrolytes.  Also noting slight hypokalemia last week which required supplement.  Will trend this as well.  There does not appear to be any infectious or toxic appearance, but if he does not improve in the next couple days we may want to consider a UA/UC.  No epistaxis today, and no repeat since the 2 episodes which were reported over the weekend.  INR is therapeutic at 2.9.  Will dose Coumadin as noted below, but carefully trend given fluctuations noted last week.  For epistaxis, will add a saline nasal spray to prevent dryness.  Noting CHF exacerbation was likely prevented, and weight has trended down.  Chacorta is very chronically ill, and would qualify for hospice services.  His discharge disposition remains complex secondary to a social issue with his son.  He may benefit from more cardiology follow-up or core clinic consultation if his CHF continues to be problematic/exacerbate.  Follow up w/in 1 week or as needed.    Orders:  Coumadin 2mg PO Qday.  Recheck INR x1 on 2/14. Dx: afib.  Recheck BMP x1 on 2/14. Dx: hypokalemia.   Saline nasal spray, 2 sprays each nostril BID. Dx: epistaxis.      Electronically signed by:  Dr. Benita Lowe, APRN CNP DNP

## 2024-02-13 ENCOUNTER — LAB REQUISITION (OUTPATIENT)
Dept: LAB | Facility: CLINIC | Age: 89
End: 2024-02-13
Payer: MEDICARE

## 2024-02-13 DIAGNOSIS — I48.91 UNSPECIFIED ATRIAL FIBRILLATION (H): ICD-10-CM

## 2024-02-14 ENCOUNTER — TELEPHONE (OUTPATIENT)
Dept: GERIATRICS | Facility: CLINIC | Age: 89
End: 2024-02-14
Payer: MEDICARE

## 2024-02-14 LAB
ANION GAP SERPL CALCULATED.3IONS-SCNC: 14 MMOL/L (ref 7–15)
BUN SERPL-MCNC: 82.5 MG/DL (ref 8–23)
CALCIUM SERPL-MCNC: 9.6 MG/DL (ref 8.8–10.2)
CHLORIDE SERPL-SCNC: 91 MMOL/L (ref 98–107)
CREAT SERPL-MCNC: 2.2 MG/DL (ref 0.67–1.17)
DEPRECATED HCO3 PLAS-SCNC: 34 MMOL/L (ref 22–29)
EGFRCR SERPLBLD CKD-EPI 2021: 28 ML/MIN/1.73M2
GLUCOSE SERPL-MCNC: 260 MG/DL (ref 70–99)
POTASSIUM SERPL-SCNC: 2.9 MMOL/L (ref 3.4–5.3)
SODIUM SERPL-SCNC: 139 MMOL/L (ref 135–145)

## 2024-02-14 PROCEDURE — P9604 ONE-WAY ALLOW PRORATED TRIP: HCPCS | Performed by: FAMILY MEDICINE

## 2024-02-14 PROCEDURE — 80048 BASIC METABOLIC PNL TOTAL CA: CPT | Performed by: FAMILY MEDICINE

## 2024-02-14 NOTE — TELEPHONE ENCOUNTER
[facility] Geriatrics Triage Nurse INR     Provider: EMILIO Potter CNP, DNP  Facility: [facility]  Facility Type:  TCU    Caller: Teresita  Call Back Number: [phone]  Reason for call: INR  Diagnosis/Goal: A. Fib    Todays INR: 2.4  Last INR 2/12 2.9(2mg daily), 2/11 2.9(held due to epistaxis), 2/7 2.8(4mg daily), 2/5 3.1(2mg on 2/5 and 4mg on 2/6), 1/29 2.6(6mg Q Monday and 4mg AOD).  Prior to hospitalization dose:  6mg M-W-F and 4mg AOD.      Heparin/Lovenox:  No  Currently on ABX?: No  Other interacting medication:  None  Missed or refused doses: No    Verbal Order/Direction given by Provider: Warfarin 2mg daily.  Next INR 2/19/24.      Provider Giving Order:  EMILIO Potter CNP, DNP    Verbal Order given to: Teresita Maza RN

## 2024-02-14 NOTE — TELEPHONE ENCOUNTER
Telephone order given to nurse Teresita Lindsey, RN on 2/14/2024 at 12:35 PM    ----- Message from EMILIO Guzman CNP sent at 2/14/2024 12:22 PM CST -----  1. KCL 40mEq PO now.  2. KCL 20mEq PO Qday x 3 days (on top of regular BID dosing).   3. Recheck BMP x1 on 2/16.   Dx: Hypokalemia.    He already had a partial hold on diuretics yesterday.     Please have nursing notify for new SOB, CP, palpitations, or VS changes.   TY!    EMILIO Ramsey CNP DNP

## 2024-02-15 ENCOUNTER — LAB REQUISITION (OUTPATIENT)
Dept: LAB | Facility: CLINIC | Age: 89
End: 2024-02-15
Payer: MEDICARE

## 2024-02-15 DIAGNOSIS — D64.9 ANEMIA, UNSPECIFIED: ICD-10-CM

## 2024-02-16 LAB
ANION GAP SERPL CALCULATED.3IONS-SCNC: 12 MMOL/L (ref 7–15)
BUN SERPL-MCNC: 71.5 MG/DL (ref 8–23)
CALCIUM SERPL-MCNC: 9.9 MG/DL (ref 8.8–10.2)
CHLORIDE SERPL-SCNC: 94 MMOL/L (ref 98–107)
CREAT SERPL-MCNC: 1.9 MG/DL (ref 0.67–1.17)
DEPRECATED HCO3 PLAS-SCNC: 35 MMOL/L (ref 22–29)
EGFRCR SERPLBLD CKD-EPI 2021: 34 ML/MIN/1.73M2
GLUCOSE SERPL-MCNC: 191 MG/DL (ref 70–99)
POTASSIUM SERPL-SCNC: 3.7 MMOL/L (ref 3.4–5.3)
SODIUM SERPL-SCNC: 141 MMOL/L (ref 135–145)

## 2024-02-16 PROCEDURE — 80048 BASIC METABOLIC PNL TOTAL CA: CPT | Performed by: FAMILY MEDICINE

## 2024-02-16 PROCEDURE — 36415 COLL VENOUS BLD VENIPUNCTURE: CPT | Performed by: FAMILY MEDICINE

## 2024-02-16 PROCEDURE — P9604 ONE-WAY ALLOW PRORATED TRIP: HCPCS | Performed by: FAMILY MEDICINE

## 2024-02-19 ENCOUNTER — TRANSITIONAL CARE UNIT VISIT (OUTPATIENT)
Dept: GERIATRICS | Facility: CLINIC | Age: 89
End: 2024-02-19
Payer: MEDICARE

## 2024-02-19 VITALS
DIASTOLIC BLOOD PRESSURE: 69 MMHG | RESPIRATION RATE: 16 BRPM | OXYGEN SATURATION: 96 % | BODY MASS INDEX: 37.53 KG/M2 | HEART RATE: 88 BPM | SYSTOLIC BLOOD PRESSURE: 123 MMHG | WEIGHT: 283.2 LBS | TEMPERATURE: 97.5 F | HEIGHT: 73 IN

## 2024-02-19 DIAGNOSIS — R04.0 EPISTAXIS: ICD-10-CM

## 2024-02-19 DIAGNOSIS — Z79.01 CURRENT USE OF LONG TERM ANTICOAGULATION: ICD-10-CM

## 2024-02-19 DIAGNOSIS — I07.1 SEVERE TRICUSPID REGURGITATION: ICD-10-CM

## 2024-02-19 DIAGNOSIS — I50.23 ACUTE ON CHRONIC HFREF (HEART FAILURE WITH REDUCED EJECTION FRACTION) (H): Primary | ICD-10-CM

## 2024-02-19 DIAGNOSIS — N18.4 STAGE 4 CHRONIC KIDNEY DISEASE (H): ICD-10-CM

## 2024-02-19 DIAGNOSIS — I48.20 CHRONIC ATRIAL FIBRILLATION (H): ICD-10-CM

## 2024-02-19 PROCEDURE — 99309 SBSQ NF CARE MODERATE MDM 30: CPT | Performed by: NURSE PRACTITIONER

## 2024-02-19 NOTE — LETTER
"    2/19/2024        RE: Chacorta Mendoza  [address] [facility] [address]        [facility] GERIATRIC SERVICE  Episodic/Acute/Follow-Up  [place] MRN: [id number]. Place of Service where encounter took place:  [facility] [place]) [4002]   Chief Complaint   Patient presents with     RECHECK    HPI: Chacorta Mendoza  is a 88 year old (1935), who is being seen today for an episodic care visit. Today's concern is:    Chacorta seen today on routine follow-up as he continues to rehab in [facility], but was moved to long-term care due to plateau and needing to wait for his home to be ready.  His adult son who lives with him, is supposedly moving out and family is arranging for someone to be available 24 hours to supervise Chacorta, it is unclear when this will happen.    Today, Chacorta says he is a little bit discombobulated by the move.  He just wants to go home.  He denies any pain, but says his shortness of breath has been improved from prior.  He denies any palpitations, chest pain, dizziness, headache.  He feels his edema is better.  He denies any constipation or diarrhea, says his appetite is \"very good\" and denies any nausea or heartburn.    Past Medical and Surgical History reviewed in Epic today.  MEDICATIONS:  Current Outpatient Medications   Medication Sig Dispense Refill     atorvastatin (LIPITOR) 10 MG tablet Take 10 mg by mouth daily       bumetanide (BUMEX) 2 MG tablet Take 4 mg by mouth 3 times daily       calcitRIOL (ROCALTROL) 0.25 MCG capsule Take 0.25 mcg by mouth daily       ferrous sulfate (FEROSUL) 325 (65 Fe) MG tablet Take 325 mg by mouth daily (with breakfast)       levothyroxine (SYNTHROID/LEVOTHROID) 100 MCG tablet Take 100 mcg by mouth daily       Lidocaine (LIDOCARE) 4 % Patch Place 1 patch onto the skin every 24 hours To prevent lidocaine toxicity, patient should be patch free for 12 hrs daily.       metolazone (ZAROXOLYN) 5 MG tablet Take 5 mg by mouth daily as needed       naloxone (NARCAN) 0.4 " "MG/ML injection Inject 0.1-0.4 mg into the vein as needed for opioid reversal       nystatin (MYCOSTATIN) 326784 UNIT/GM external powder Apply topically 2 times daily       pantoprazole (PROTONIX) 2 mg/mL SUSP suspension Take 20 mLs (40 mg) by mouth every morning (before breakfast)       polyethylene glycol (MIRALAX) 17 g packet Take 17 g by mouth daily       potassium chloride ER (KLOR-CON M) 10 MEQ CR tablet Take 2 tablets by mouth daily with food       rOPINIRole (REQUIP) 1 MG tablet Take 1 mg by mouth At Bedtime       sennosides (SENOKOT) 8.6 MG tablet Take 1 tablet by mouth 2 times daily       WARFARIN SODIUM PO 2/14/24 INR 2.4  Take 2mg daily.  Next INR 2/19/24.       Objective: /69   Pulse 88   Temp 97.5  F (36.4  C)   Resp 16   Ht 1.854 m (6' 1\")   Wt 128.5 kg (283 lb 3.2 oz)   SpO2 96%   BMI 37.36 kg/m    Weights:      Exam:  GENERAL APPEARANCE: Alert, in no distress, cooperative.   RESP: Respiratory effort good, no respiratory distress, Lung sounds clear/diminished. On RA.   CV: Auscultation of heart reveals S1, S2, rate controlled and rhythm irregular, no murmur, no rub or gallop, Edema 2-3+ BLE. Peripheral pulses are 2+.  PSYCH: Insight, judgement, and memory are impaired at baseline, affect and mood are happy/engaged.    Labs: Labs done in facility are in EPIC. Please refer to them using [employer]/Care Everywhere.    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 Ongoing.   Provider reviewed records from hospitalization, facility, and interpreted most recent imaging/lab work, and vital signs.  INR slightly subtherapeutic today at 1.9.  Will dose Coumadin as noted below and recheck INR in about 1 week.  Noting complex social situation which is inhibiting discharge at this time.  " This is ongoing and work in progress.  Recommending new managing provider to trend BMP with in the next week or 2.    Unclear follow-up status with [facility].  Chacorta was supposed to have an appointment posthospitalization, but this did not occur for some reason.  Provider coordinated care with nursing who is investigating.  There have been no further episodes of epistaxis.    CHF appears to be better controlled this week, but is labile and will need ongoing monitoring.  Follow up w/in 1 week or as needed.    Orders:  Coumadin 4mg PO x1 today.  Coumadin 2mg PO Qday on AOD.  Recheck INR x1 on 2/26.   Dx: afib.    Electronically signed by:  EMILIO Ramsey CNP DNP        Sincerely,        EMILIO Dwyer CNP

## 2024-02-19 NOTE — PROGRESS NOTES
"[facility] GERIATRIC SERVICE  Episodic/Acute/Follow-Up  [place] MRN: [id number]. Place of Service where encounter took place:  [facility] [place] ([facility]) [[id number]]   Chief Complaint   Patient presents with    RECHECK    HPI: Chacorta Mendoza  is a 88 year old (1935), who is being seen today for an episodic care visit. Today's concern is:    Chacorta seen today on routine follow-up as he continues to rehab in [facility], but was moved to long-term care due to plateau and needing to wait for his home to be ready.  His adult son who lives with him, is supposedly moving out and family is arranging for someone to be available 24 hours to supervise Chacorta, it is unclear when this will happen.    Today, Chacorta says he is a little bit discombobulated by the move.  He just wants to go home.  He denies any pain, but says his shortness of breath has been improved from prior.  He denies any palpitations, chest pain, dizziness, headache.  He feels his edema is better.  He denies any constipation or diarrhea, says his appetite is \"very good\" and denies any nausea or heartburn.    Past Medical and Surgical History reviewed in Epic today.  MEDICATIONS:  Current Outpatient Medications   Medication Sig Dispense Refill    atorvastatin (LIPITOR) 10 MG tablet Take 10 mg by mouth daily      bumetanide (BUMEX) 2 MG tablet Take 4 mg by mouth 3 times daily      calcitRIOL (ROCALTROL) 0.25 MCG capsule Take 0.25 mcg by mouth daily      ferrous sulfate (FEROSUL) 325 (65 Fe) MG tablet Take 325 mg by mouth daily (with breakfast)      levothyroxine (SYNTHROID/LEVOTHROID) 100 MCG tablet Take 100 mcg by mouth daily      Lidocaine (LIDOCARE) 4 % Patch Place 1 patch onto the skin every 24 hours To prevent lidocaine toxicity, patient should be patch free for 12 hrs daily.      metolazone (ZAROXOLYN) 5 MG tablet Take 5 mg by mouth daily as needed      naloxone (NARCAN) 0.4 MG/ML injection Inject 0.1-0.4 mg into the vein as needed for opioid reversal      nystatin " "(MYCOSTATIN) 898650 UNIT/GM external powder Apply topically 2 times daily      pantoprazole (PROTONIX) 2 mg/mL SUSP suspension Take 20 mLs (40 mg) by mouth every morning (before breakfast)      polyethylene glycol (MIRALAX) 17 g packet Take 17 g by mouth daily      potassium chloride ER (KLOR-CON M) 10 MEQ CR tablet Take 2 tablets by mouth daily with food      rOPINIRole (REQUIP) 1 MG tablet Take 1 mg by mouth At Bedtime      sennosides (SENOKOT) 8.6 MG tablet Take 1 tablet by mouth 2 times daily      WARFARIN SODIUM PO 2/14/24 INR 2.4  Take 2mg daily.  Next INR 2/19/24.       Objective: /69   Pulse 88   Temp 97.5  F (36.4  C)   Resp 16   Ht 1.854 m (6' 1\")   Wt 128.5 kg (283 lb 3.2 oz)   SpO2 96%   BMI 37.36 kg/m    Weights:      Exam:  GENERAL APPEARANCE: Alert, in no distress, cooperative.   RESP: Respiratory effort good, no respiratory distress, Lung sounds clear/diminished. On RA.   CV: Auscultation of heart reveals S1, S2, rate controlled and rhythm irregular, no murmur, no rub or gallop, Edema 2-3+ BLE. Peripheral pulses are 2+.  PSYCH: Insight, judgement, and memory are impaired at baseline, affect and mood are happy/engaged.    Labs: Labs done in facility are in EPIC. Please refer to them using [facility] Everywhere.    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 Ongoing.   Provider reviewed records from hospitalization, facility, and interpreted most recent imaging/lab work, and vital signs.  INR slightly subtherapeutic today at 1.9.  Will dose Coumadin as noted below and recheck INR in about 1 week.  Noting complex social situation which is inhibiting discharge at this time.  This is ongoing and work in progress.  Recommending new managing provider to trend BMP with in the " next week or 2.    Unclear follow-up status with [facility].  Chacorta was supposed to have an appointment posthospitalization, but this did not occur for some reason.  Provider coordinated care with nursing who is investigating.  There have been no further episodes of epistaxis.    CHF appears to be better controlled this week, but is labile and will need ongoing monitoring.  Follow up w/in 1 week or as needed.    Orders:  Coumadin 4mg PO x1 today.  Coumadin 2mg PO Qday on AOD.  Recheck INR x1 on 2/26.   Dx: afib.    Electronically signed by:  Dr. Benita Lowe, APRN CNP DNP

## 2024-02-21 ENCOUNTER — DISCHARGE SUMMARY NURSING HOME (OUTPATIENT)
Dept: GERIATRICS | Facility: CLINIC | Age: 89
End: 2024-02-21
Payer: MEDICARE

## 2024-02-21 VITALS
SYSTOLIC BLOOD PRESSURE: 121 MMHG | HEIGHT: 73 IN | OXYGEN SATURATION: 94 % | WEIGHT: 283 LBS | RESPIRATION RATE: 16 BRPM | BODY MASS INDEX: 37.51 KG/M2 | TEMPERATURE: 98.5 F | HEART RATE: 61 BPM | DIASTOLIC BLOOD PRESSURE: 72 MMHG

## 2024-02-21 DIAGNOSIS — I10 ESSENTIAL HYPERTENSION: ICD-10-CM

## 2024-02-21 DIAGNOSIS — N18.31 TYPE 2 DIABETES MELLITUS WITH STAGE 3A CHRONIC KIDNEY DISEASE, WITHOUT LONG-TERM CURRENT USE OF INSULIN (H): ICD-10-CM

## 2024-02-21 DIAGNOSIS — I50.22 CHRONIC SYSTOLIC HEART FAILURE (H): ICD-10-CM

## 2024-02-21 DIAGNOSIS — E11.22 TYPE 2 DIABETES MELLITUS WITH STAGE 3A CHRONIC KIDNEY DISEASE, WITHOUT LONG-TERM CURRENT USE OF INSULIN (H): ICD-10-CM

## 2024-02-21 DIAGNOSIS — I07.1 SEVERE TRICUSPID REGURGITATION: ICD-10-CM

## 2024-02-21 DIAGNOSIS — I48.20 CHRONIC ATRIAL FIBRILLATION (H): ICD-10-CM

## 2024-02-21 DIAGNOSIS — N18.4 STAGE 4 CHRONIC KIDNEY DISEASE (H): Primary | ICD-10-CM

## 2024-02-21 PROCEDURE — 99316 NF DSCHRG MGMT 30 MIN+: CPT | Performed by: NURSE PRACTITIONER

## 2024-02-21 RX ORDER — ACETAMINOPHEN 325 MG/1
650 TABLET ORAL 3 TIMES DAILY PRN
COMMUNITY

## 2024-02-21 RX ORDER — ACETAMINOPHEN 500 MG
1000 TABLET ORAL 2 TIMES DAILY
COMMUNITY

## 2024-02-21 NOTE — LETTER
2/21/2024        RE: Chacorta Mendoza  [address] [address] [facility] [place] [address]        [facility] GERIATRICS DISCHARGE SUMMARY  PATIENT'S NAME: Chacorta Mendoza  YOB: 1935  MEDICAL RECORD NUMBER:  [id number]  Place of Service where encounter took place:  [facility] () [[id number]]    PRIMARY CARE PROVIDER AND CLINIC RESPONSIBLE AFTER TRANSFER:   Britt Fernandez NP, [facility] [address]    [facility] Provider     Transferring providers: Susannah Staton MA, Dr. Shahab Seals MD  Recent Hospitalization/ED:  [facility] stay 12/26/23 to 1/11/24.  Date of SNF Admission: January 11, 2024  Date of SNF (anticipated) Discharge: February 23, 2024  Discharged to: previous independent home    CODE STATUS/ADVANCE DIRECTIVES DISCUSSION:  No CPR- Do NOT Intubate   ALLERGIES: Aspirin, Levofloxacin, Lisinopril, and Losartan    NURSING FACILITY COURSE   Medication Changes/Rationale:   No medication changes     Summary of nursing facility stay:      Stage 4 chronic kidney disease (H)  Essential hypertension  Chronic systolic heart failure (H)  Severe tricuspid regurgitation  Type 2 diabetes mellitus with stage 3a chronic kidney disease, without long-term current use of insulin (H)  Chronic atrial fibrillation (H)    Patient admitted following hospitalization for congestive heart failure. He participated in physical and occupational therapy and met all goals. Resident code status DNR and remains current. Stable at time of discharge.     Discharge Medications:  MED REC REQUIRED  Post Medication Reconciliation Status: patient was not discharged from an inpatient facility or TCU       Current Outpatient Medications   Medication Sig Dispense Refill     acetaminophen (TYLENOL) 325 MG tablet Take 650 mg by mouth 3 times daily as needed for mild pain       acetaminophen (TYLENOL) 500 MG tablet Take 1,000 mg by mouth 2 times daily related to  PAIN, UNSPECIFIED (R52) Max dose is 4000mg  "in  24hrs from all sources       atorvastatin (LIPITOR) 10 MG tablet Take 10 mg by mouth daily       bumetanide (BUMEX) 2 MG tablet Take 4 mg by mouth 3 times daily       calcitRIOL (ROCALTROL) 0.25 MCG capsule Take 0.25 mcg by mouth daily       ferrous sulfate (FEROSUL) 325 (65 Fe) MG tablet Take 325 mg by mouth daily (with breakfast)       levothyroxine (SYNTHROID/LEVOTHROID) 100 MCG tablet Take 100 mcg by mouth daily       Lidocaine (LIDOCARE) 4 % Patch Place 1 patch onto the skin every 24 hours To prevent lidocaine toxicity, patient should be patch free for 12 hrs daily.       metolazone (ZAROXOLYN) 5 MG tablet Take 5 mg by mouth daily as needed       naloxone (NARCAN) 0.4 MG/ML injection Inject 0.1-0.4 mg into the vein as needed for opioid reversal       nystatin (MYCOSTATIN) 769006 UNIT/GM external powder Apply topically 2 times daily       pantoprazole (PROTONIX) 2 mg/mL SUSP suspension Take 20 mLs (40 mg) by mouth every morning (before breakfast)       polyethylene glycol (MIRALAX) 17 g packet Take 17 g by mouth daily       potassium chloride ER (KLOR-CON M) 10 MEQ CR tablet Take 2 tablets by mouth daily with food       rOPINIRole (REQUIP) 1 MG tablet Take 1 mg by mouth At Bedtime       sennosides (SENOKOT) 8.6 MG tablet Take 1 tablet by mouth 2 times daily       sodium chloride (OCEAN) 0.65 % nasal spray Spray 2 sprays into both nostrils 2 times daily       WARFARIN SODIUM PO 2/14/24 INR 2.4  Take 2mg daily.  Next INR 2/19/24.          MED REC REQUIRED  Post Medication Reconciliation Status: patient was not discharged from an inpatient facility or TCU     Controlled medications:   not applicable/none     Past Medical History: No past medical history on file.  Physical Exam:   Vitals: /72   Pulse 61   Temp 98.5  F (36.9  C)   Resp 16   Ht 1.854 m (6' 1\")   Wt 128.4 kg (283 lb)   SpO2 94%   BMI 37.34 kg/m    BMI: Body mass index is 37.34 kg/m .  GENERAL APPEARANCE:  Alert, in no distress  ENT:  " Mouth and posterior oropharynx normal, moist mucous membranes, normal hearing acuity, [place]  EYES:  EOM, conjunctivae, lids, pupils and irises normal  RESP:  respiratory effort and palpation of chest normal, lungs clear to auscultation , no respiratory distress  CV:  Palpation and auscultation of heart done , regular rate and rhythm, no murmur, rub, or gallop  ABDOMEN:  normal bowel sounds, soft, nontender, no hepatosplenomegaly or other masses  M/S:   Gait and station normal  Digits and nails normal  SKIN:  Inspection of skin and subcutaneous tissue baseline, Palpation of skin and subcutaneous tissue baseline  NEURO:   Cranial nerves 2-12 are normal tested and grossly at patient's baseline  PSYCH:  oriented X 3     SNF labs: Recent labs in [facility] reviewed by me today.     DISCHARGE PLAN:  Follow up labs: No labs orders/due  Medical Follow Up:      Follow up with primary care provider in 1 weeks  [facility] scheduled appointments:  Appointments in Next Year      No future appts.       Discharge Services: No therapy or home care recommended.   Discharge Instructions Verbalized to Patient at Discharge:   None    TOTAL DISCHARGE TIME:   Greater than 30 minutes  Electronically signed by:  Britt Fernandez NP                 Sincerely,        Britt Fernandez NP

## 2024-02-21 NOTE — PROGRESS NOTES
[facility] GERIATRICS DISCHARGE SUMMARY  PATIENT'S NAME: Chacorta Mendoza  YOB: 1935  MEDICAL RECORD NUMBER:  [id number]  Place of Service where encounter took place:  [facility] () [[id number]]    PRIMARY CARE PROVIDER AND CLINIC RESPONSIBLE AFTER TRANSFER:   Britt Fernandez NP, [address] [facility] [address]    [facility] Provider     Transferring providers: Susannah Staton MA, Dr. Shahab Seals MD  Recent Hospitalization/ED:  [facility] stay 12/26/23 to 1/11/24.  Date of SNF Admission: January 11, 2024  Date of SNF (anticipated) Discharge: February 23, 2024  Discharged to: previous independent home    CODE STATUS/ADVANCE DIRECTIVES DISCUSSION:  No CPR- Do NOT Intubate   ALLERGIES: Aspirin, Levofloxacin, Lisinopril, and Losartan    NURSING FACILITY COURSE   Medication Changes/Rationale:   No medication changes     Summary of nursing facility stay:      Stage 4 chronic kidney disease (H)  Essential hypertension  Chronic systolic heart failure (H)  Severe tricuspid regurgitation  Type 2 diabetes mellitus with stage 3a chronic kidney disease, without long-term current use of insulin (H)  Chronic atrial fibrillation (H)    Patient admitted following hospitalization for congestive heart failure. He participated in physical and occupational therapy and met all goals. Resident code status DNR and remains current. Stable at time of discharge.     Discharge Medications:  MED REC REQUIRED  Post Medication Reconciliation Status: patient was not discharged from an inpatient facility or TCU       Current Outpatient Medications   Medication Sig Dispense Refill    acetaminophen (TYLENOL) 325 MG tablet Take 650 mg by mouth 3 times daily as needed for mild pain      acetaminophen (TYLENOL) 500 MG tablet Take 1,000 mg by mouth 2 times daily related to  PAIN, UNSPECIFIED (R52) Max dose is 4000mg in  24hrs from all sources      atorvastatin (LIPITOR) 10 MG tablet Take 10 mg by mouth daily  "     bumetanide (BUMEX) 2 MG tablet Take 4 mg by mouth 3 times daily      calcitRIOL (ROCALTROL) 0.25 MCG capsule Take 0.25 mcg by mouth daily      ferrous sulfate (FEROSUL) 325 (65 Fe) MG tablet Take 325 mg by mouth daily (with breakfast)      levothyroxine (SYNTHROID/LEVOTHROID) 100 MCG tablet Take 100 mcg by mouth daily      Lidocaine (LIDOCARE) 4 % Patch Place 1 patch onto the skin every 24 hours To prevent lidocaine toxicity, patient should be patch free for 12 hrs daily.      metolazone (ZAROXOLYN) 5 MG tablet Take 5 mg by mouth daily as needed      naloxone (NARCAN) 0.4 MG/ML injection Inject 0.1-0.4 mg into the vein as needed for opioid reversal      nystatin (MYCOSTATIN) 706078 UNIT/GM external powder Apply topically 2 times daily      pantoprazole (PROTONIX) 2 mg/mL SUSP suspension Take 20 mLs (40 mg) by mouth every morning (before breakfast)      polyethylene glycol (MIRALAX) 17 g packet Take 17 g by mouth daily      potassium chloride ER (KLOR-CON M) 10 MEQ CR tablet Take 2 tablets by mouth daily with food      rOPINIRole (REQUIP) 1 MG tablet Take 1 mg by mouth At Bedtime      sennosides (SENOKOT) 8.6 MG tablet Take 1 tablet by mouth 2 times daily      sodium chloride (OCEAN) 0.65 % nasal spray Spray 2 sprays into both nostrils 2 times daily      WARFARIN SODIUM PO 2/14/24 INR 2.4  Take 2mg daily.  Next INR 2/19/24.          MED REC REQUIRED  Post Medication Reconciliation Status: patient was not discharged from an inpatient facility or TCU     Controlled medications:   not applicable/none     Past Medical History: No past medical history on file.  Physical Exam:   Vitals: /72   Pulse 61   Temp 98.5  F (36.9  C)   Resp 16   Ht 1.854 m (6' 1\")   Wt 128.4 kg (283 lb)   SpO2 94%   BMI 37.34 kg/m    BMI: Body mass index is 37.34 kg/m .  GENERAL APPEARANCE:  Alert, in no distress  ENT:  Mouth and posterior oropharynx normal, moist mucous membranes, normal hearing acuity, [place]  EYES:  EOM, " conjunctivae, lids, pupils and irises normal  RESP:  respiratory effort and palpation of chest normal, lungs clear to auscultation , no respiratory distress  CV:  Palpation and auscultation of heart done , regular rate and rhythm, no murmur, rub, or gallop  ABDOMEN:  normal bowel sounds, soft, nontender, no hepatosplenomegaly or other masses  M/S:   Gait and station normal  Digits and nails normal  SKIN:  Inspection of skin and subcutaneous tissue baseline, Palpation of skin and subcutaneous tissue baseline  NEURO:   Cranial nerves 2-12 are normal tested and grossly at patient's baseline  PSYCH:  oriented X 3     SNF labs: Recent labs in [facility] reviewed by me today.     DISCHARGE PLAN:  Follow up labs: No labs orders/due  Medical Follow Up:      Follow up with primary care provider in 1 weeks  Current [place] scheduled appointments:  Appointments in Next Year      No future appts.       Discharge Services: No therapy or home care recommended.   Discharge Instructions Verbalized to Patient at Discharge:   None    TOTAL DISCHARGE TIME:   Greater than 30 minutes  Electronically signed by:  Britt Fernandez NP

## 2024-02-27 ENCOUNTER — DOCUMENTATION ONLY (OUTPATIENT)
Dept: GERIATRICS | Facility: CLINIC | Age: 89
End: 2024-02-27
Payer: MEDICARE

## (undated) DEVICE — Device

## (undated) DEVICE — DEVICE RETRIEVAL ROTH NET PLATINUM UNIV 2.5MMX230CM 00715050

## (undated) DEVICE — SUCTION MANIFOLD NEPTUNE 2 SYS 1 PORT 702-025-000

## (undated) RX ORDER — FENTANYL CITRATE 50 UG/ML
INJECTION, SOLUTION INTRAMUSCULAR; INTRAVENOUS
Status: DISPENSED
Start: 2023-05-28

## (undated) RX ORDER — EPHEDRINE SULFATE 50 MG/ML
INJECTION, SOLUTION INTRAMUSCULAR; INTRAVENOUS; SUBCUTANEOUS
Status: DISPENSED
Start: 2023-05-28

## (undated) RX ORDER — PROPOFOL 10 MG/ML
INJECTION, EMULSION INTRAVENOUS
Status: DISPENSED
Start: 2023-05-28

## (undated) RX ORDER — FENTANYL CITRATE-0.9 % NACL/PF 10 MCG/ML
PLASTIC BAG, INJECTION (ML) INTRAVENOUS
Status: DISPENSED
Start: 2023-05-28

## (undated) RX ORDER — LIDOCAINE HYDROCHLORIDE 10 MG/ML
INJECTION, SOLUTION EPIDURAL; INFILTRATION; INTRACAUDAL; PERINEURAL
Status: DISPENSED
Start: 2023-05-28